# Patient Record
Sex: MALE | Race: WHITE | Employment: OTHER | URBAN - METROPOLITAN AREA
[De-identification: names, ages, dates, MRNs, and addresses within clinical notes are randomized per-mention and may not be internally consistent; named-entity substitution may affect disease eponyms.]

---

## 2017-02-27 ENCOUNTER — TELEPHONE (OUTPATIENT)
Dept: INTERNAL MEDICINE CLINIC | Facility: CLINIC | Age: 62
End: 2017-02-27

## 2017-02-27 NOTE — TELEPHONE ENCOUNTER
Patient was phoned today and advised / left a message asking to reschedule today's No Show appointment. Advised that there will be a $50 charge assessed when patient has two No Shows within 365 days.

## 2017-03-13 ENCOUNTER — OFFICE VISIT (OUTPATIENT)
Dept: INTERNAL MEDICINE CLINIC | Facility: CLINIC | Age: 62
End: 2017-03-13

## 2017-03-13 VITALS
OXYGEN SATURATION: 98 % | TEMPERATURE: 97 F | HEART RATE: 75 BPM | SYSTOLIC BLOOD PRESSURE: 130 MMHG | DIASTOLIC BLOOD PRESSURE: 78 MMHG | WEIGHT: 233 LBS | BODY MASS INDEX: 37.01 KG/M2 | HEIGHT: 66.5 IN | RESPIRATION RATE: 16 BRPM

## 2017-03-13 DIAGNOSIS — I25.10 CORONARY ARTERY DISEASE INVOLVING NATIVE CORONARY ARTERY OF NATIVE HEART WITHOUT ANGINA PECTORIS: ICD-10-CM

## 2017-03-13 DIAGNOSIS — R73.02 IGT (IMPAIRED GLUCOSE TOLERANCE): ICD-10-CM

## 2017-03-13 DIAGNOSIS — G25.81 RLS (RESTLESS LEGS SYNDROME): ICD-10-CM

## 2017-03-13 DIAGNOSIS — G47.33 OSA (OBSTRUCTIVE SLEEP APNEA): ICD-10-CM

## 2017-03-13 DIAGNOSIS — I10 ESSENTIAL HYPERTENSION: Primary | ICD-10-CM

## 2017-03-13 DIAGNOSIS — E78.5 DYSLIPIDEMIA: ICD-10-CM

## 2017-03-13 PROCEDURE — 99214 OFFICE O/P EST MOD 30 MIN: CPT | Performed by: INTERNAL MEDICINE

## 2017-03-13 RX ORDER — ROPINIROLE 0.5 MG/1
TABLET, FILM COATED ORAL
Qty: 90 TABLET | Refills: 5 | Status: SHIPPED | OUTPATIENT
Start: 2017-03-13 | End: 2017-10-04

## 2017-03-13 RX ORDER — FENOFIBRIC ACID 135 MG/1
1 CAPSULE, DELAYED RELEASE ORAL
Qty: 30 CAPSULE | Refills: 5 | Status: SHIPPED | OUTPATIENT
Start: 2017-03-13 | End: 2017-10-04

## 2017-03-13 RX ORDER — PRAVASTATIN SODIUM 20 MG
20 TABLET ORAL DAILY
Qty: 30 TABLET | Refills: 5 | Status: ON HOLD | OUTPATIENT
Start: 2017-03-13 | End: 2017-08-22

## 2017-03-13 RX ORDER — METOPROLOL SUCCINATE 50 MG/1
50 TABLET, EXTENDED RELEASE ORAL
Qty: 30 TABLET | Refills: 5 | Status: SHIPPED | OUTPATIENT
Start: 2017-03-13 | End: 2017-10-04

## 2017-03-13 RX ORDER — LISINOPRIL 10 MG/1
10 TABLET ORAL
Qty: 30 TABLET | Refills: 5 | Status: SHIPPED | OUTPATIENT
Start: 2017-03-13 | End: 2017-10-04

## 2017-03-13 NOTE — PROGRESS NOTES
Monie Jackson is a 62year old male.  To F/U from last visit regarding as follows:            HPI:    Interim history:he did f/u w/ and they gave him viagra, which he hasn't used yet PSA and exam stable, no f/ indicated  He sees Dr Azul Bazan every 6 fasciculation-cramp syndrome (HCC)     Drug-induced erectile dysfunction     AMALIA (obstructive sleep apnea)        REVIEW OF SYSTEMS:   GENERAL HEALTH: feels well otherwise  SKIN: denies any unusual skin lesions or rashes  RESPIRATORY: denies shortness of b mouth once daily. lisinopril 10 MG Oral Tab 30 tablet 5      Sig: Take 1 tablet (10 mg total) by mouth once daily. Pravastatin Sodium 20 MG Oral Tab 30 tablet 5      Sig: Take 1 tablet (20 mg total) by mouth daily.       Choline Fenofibrate (FENOF

## 2017-07-12 NOTE — ADDENDUM NOTE
Encounter addended by: Francis Pereira LPN on: 8/29/4913 66:12 AM<BR>    Actions taken: Letter status changed

## 2017-08-20 ENCOUNTER — HOSPITAL ENCOUNTER (INPATIENT)
Facility: HOSPITAL | Age: 62
LOS: 2 days | Discharge: NURSING, REHAB OR LONG-TERM CARE | DRG: 064 | End: 2017-08-23
Attending: EMERGENCY MEDICINE | Admitting: HOSPITALIST
Payer: COMMERCIAL

## 2017-08-20 ENCOUNTER — APPOINTMENT (OUTPATIENT)
Dept: MRI IMAGING | Facility: HOSPITAL | Age: 62
DRG: 064 | End: 2017-08-20
Attending: EMERGENCY MEDICINE
Payer: COMMERCIAL

## 2017-08-20 ENCOUNTER — APPOINTMENT (OUTPATIENT)
Dept: CT IMAGING | Facility: HOSPITAL | Age: 62
DRG: 064 | End: 2017-08-20
Attending: EMERGENCY MEDICINE
Payer: COMMERCIAL

## 2017-08-20 ENCOUNTER — APPOINTMENT (OUTPATIENT)
Dept: GENERAL RADIOLOGY | Facility: HOSPITAL | Age: 62
DRG: 064 | End: 2017-08-20
Attending: EMERGENCY MEDICINE
Payer: COMMERCIAL

## 2017-08-20 DIAGNOSIS — R27.0 ATAXIA: Primary | ICD-10-CM

## 2017-08-20 DIAGNOSIS — I65.01 OCCLUSION OF RIGHT VERTEBRAL ARTERY: ICD-10-CM

## 2017-08-20 DIAGNOSIS — I63.9 ACUTE CVA (CEREBROVASCULAR ACCIDENT) (HCC): ICD-10-CM

## 2017-08-20 LAB
ALBUMIN SERPL-MCNC: 4.3 G/DL (ref 3.5–4.8)
ALP LIVER SERPL-CCNC: 93 U/L (ref 45–117)
ALT SERPL-CCNC: 21 U/L (ref 17–63)
APTT PPP: 28.9 SECONDS (ref 25–34)
AST SERPL-CCNC: 15 U/L (ref 15–41)
BASOPHILS # BLD AUTO: 0.05 X10(3) UL (ref 0–0.1)
BASOPHILS NFR BLD AUTO: 0.6 %
BILIRUB SERPL-MCNC: 0.8 MG/DL (ref 0.1–2)
BUN BLD-MCNC: 16 MG/DL (ref 8–20)
CALCIUM BLD-MCNC: 9.2 MG/DL (ref 8.3–10.3)
CHLORIDE: 105 MMOL/L (ref 101–111)
CO2: 24 MMOL/L (ref 22–32)
CREAT BLD-MCNC: 1.34 MG/DL (ref 0.7–1.3)
EOSINOPHIL # BLD AUTO: 0.19 X10(3) UL (ref 0–0.3)
EOSINOPHIL NFR BLD AUTO: 2.2 %
ERYTHROCYTE [DISTWIDTH] IN BLOOD BY AUTOMATED COUNT: 13 % (ref 11.5–16)
GLUCOSE BLD-MCNC: 116 MG/DL (ref 65–99)
GLUCOSE BLD-MCNC: 123 MG/DL (ref 70–99)
HCT VFR BLD AUTO: 52.3 % (ref 37–53)
HGB BLD-MCNC: 18 G/DL (ref 13–17)
IMMATURE GRANULOCYTE COUNT: 0.03 X10(3) UL (ref 0–1)
IMMATURE GRANULOCYTE RATIO %: 0.3 %
INR BLD: 0.98 (ref 0.89–1.11)
LYMPHOCYTES # BLD AUTO: 1.78 X10(3) UL (ref 0.9–4)
LYMPHOCYTES NFR BLD AUTO: 20.6 %
M PROTEIN MFR SERPL ELPH: 8.1 G/DL (ref 6.1–8.3)
MCH RBC QN AUTO: 30.3 PG (ref 27–33.2)
MCHC RBC AUTO-ENTMCNC: 34.4 G/DL (ref 31–37)
MCV RBC AUTO: 87.9 FL (ref 80–99)
MONOCYTES # BLD AUTO: 0.62 X10(3) UL (ref 0.1–0.6)
MONOCYTES NFR BLD AUTO: 7.2 %
NEUTROPHIL ABS PRELIM: 5.96 X10 (3) UL (ref 1.3–6.7)
NEUTROPHILS # BLD AUTO: 5.96 X10(3) UL (ref 1.3–6.7)
NEUTROPHILS NFR BLD AUTO: 69.1 %
PLATELET # BLD AUTO: 270 10(3)UL (ref 150–450)
POTASSIUM SERPL-SCNC: 3.7 MMOL/L (ref 3.6–5.1)
PSA SERPL DL<=0.01 NG/ML-MCNC: 13 SECONDS (ref 12–14.3)
RBC # BLD AUTO: 5.95 X10(6)UL (ref 4.3–5.7)
RED CELL DISTRIBUTION WIDTH-SD: 41.5 FL (ref 35.1–46.3)
SODIUM SERPL-SCNC: 138 MMOL/L (ref 136–144)
TROPONIN: <0.046 NG/ML (ref ?–0.05)
WBC # BLD AUTO: 8.6 X10(3) UL (ref 4–13)

## 2017-08-20 PROCEDURE — B33RYZZ MAGNETIC RESONANCE IMAGING (MRI) OF INTRACRANIAL ARTERIES USING OTHER CONTRAST: ICD-10-PCS | Performed by: RADIOLOGY

## 2017-08-20 PROCEDURE — B33QYZZ MAGNETIC RESONANCE IMAGING (MRI) OF CERVICO-CEREBRAL ARCH USING OTHER CONTRAST: ICD-10-PCS | Performed by: RADIOLOGY

## 2017-08-20 PROCEDURE — 70553 MRI BRAIN STEM W/O & W/DYE: CPT | Performed by: EMERGENCY MEDICINE

## 2017-08-20 PROCEDURE — 71010 XR CHEST AP PORTABLE  (CPT=71010): CPT | Performed by: EMERGENCY MEDICINE

## 2017-08-20 PROCEDURE — 99223 1ST HOSP IP/OBS HIGH 75: CPT | Performed by: HOSPITALIST

## 2017-08-20 PROCEDURE — 70450 CT HEAD/BRAIN W/O DYE: CPT | Performed by: EMERGENCY MEDICINE

## 2017-08-20 PROCEDURE — 70549 MR ANGIOGRAPH NECK W/O&W/DYE: CPT | Performed by: EMERGENCY MEDICINE

## 2017-08-20 PROCEDURE — 70546 MR ANGIOGRAPH HEAD W/O&W/DYE: CPT | Performed by: EMERGENCY MEDICINE

## 2017-08-20 RX ORDER — HYDRALAZINE HYDROCHLORIDE 20 MG/ML
10 INJECTION INTRAMUSCULAR; INTRAVENOUS ONCE
Status: COMPLETED | OUTPATIENT
Start: 2017-08-20 | End: 2017-08-20

## 2017-08-20 RX ORDER — HYDRALAZINE HYDROCHLORIDE 20 MG/ML
INJECTION INTRAMUSCULAR; INTRAVENOUS
Status: DISPENSED
Start: 2017-08-20 | End: 2017-08-21

## 2017-08-20 NOTE — ED INITIAL ASSESSMENT (HPI)
Pt presents to ER with unsteady gate starting yesterday about 1700 and numbness/tingling to left side of face. Pt states that yesterday at 1700, \"I walked into the wall, like I was drunk. \" pt states that numbness and tingling to left side of face, he wok

## 2017-08-20 NOTE — ED PROVIDER NOTES
Patient Seen in: [facility] Emergency Department    History   Patient presents with:  Stroke (neurologic)    Stated Complaint: trouple speaking, unsteady gate, started yesterday    HPI    20-year-old white male who presents the emergency room today Release,  Take 1 capsule by mouth once daily. No family history on file.     Smoking status: Former Smoker                                                              Packs/day: 0.00      Years: 20.00        Types: Cigarettes  Smokeless tobacco: Cielo Lopez hamstrings dorsiflexion plantar flexion are 5 over 5 equal and symmetrical.  Knee jerk reflexes are 1+ equal and symmetrical.    Skin exam reveals no rashes.         ED Course     Labs Reviewed   COMP METABOLIC PANEL (14) - Abnormal; Notable for the followi normal.  The lungs are clear.  There are no pleural effusions.  The bones are unremarkable.        Impression     CONCLUSION:  No acute disease.           CT scan of the head reveals:  FINDINGS:       VENTRICLES/SULCI:  Ventricles and sulci are prominent co T2 / FLAIR hyperintensities in the subcortical periventricular matter consistent with small vessel scan change. There is no evidence of mass lesion or mass effect.  There is susceptibility artifact noted from a piece of metal   within the right scalp on mul minimal reconstitution of the vertebral artery within the distal V4 segment on the right likely due to retrograde flow. This occlusion may represent a   dissection of the vertebral artery at the origin.  Possibility of in situ thrombosis of a vertebral lois Impression:  Ataxia  (primary encounter diagnosis)  Acute CVA (cerebrovascular accident) ([facility] [address]  Occlusion of right vertebral artery    Disposition:  Admit    Follow-up:  No follow-up provider specified.     Medications Prescribed:  Current Discharge Medicat

## 2017-08-21 ENCOUNTER — APPOINTMENT (OUTPATIENT)
Dept: CV DIAGNOSTICS | Facility: HOSPITAL | Age: 62
DRG: 064 | End: 2017-08-21
Attending: HOSPITALIST
Payer: COMMERCIAL

## 2017-08-21 PROBLEM — I63.9 ACUTE CVA (CEREBROVASCULAR ACCIDENT) (HCC): Status: ACTIVE | Noted: 2017-08-21

## 2017-08-21 PROBLEM — I65.01 OCCLUSION OF RIGHT VERTEBRAL ARTERY: Status: ACTIVE | Noted: 2017-08-21

## 2017-08-21 PROBLEM — N18.30 CKD (CHRONIC KIDNEY DISEASE), STAGE III (HCC): Status: ACTIVE | Noted: 2017-08-21

## 2017-08-21 LAB
ATRIAL RATE: 74 BPM
BILIRUB UR QL STRIP.AUTO: NEGATIVE
CLARITY UR REFRACT.AUTO: CLEAR
COLOR UR AUTO: YELLOW
EST. AVERAGE GLUCOSE BLD GHB EST-MCNC: 117 MG/DL (ref 68–126)
GLUCOSE UR STRIP.AUTO-MCNC: NEGATIVE MG/DL
HBA1C MFR BLD HPLC: 5.7 % (ref ?–5.7)
KETONES UR STRIP.AUTO-MCNC: NEGATIVE MG/DL
LEUKOCYTE ESTERASE UR QL STRIP.AUTO: NEGATIVE
NITRITE UR QL STRIP.AUTO: NEGATIVE
P AXIS: 60 DEGREES
P-R INTERVAL: 136 MS
PH UR STRIP.AUTO: 5 [PH] (ref 4.5–8)
POTASSIUM SERPL-SCNC: 4.2 MMOL/L (ref 3.6–5.1)
PROT UR STRIP.AUTO-MCNC: NEGATIVE MG/DL
Q-T INTERVAL: 380 MS
QRS DURATION: 98 MS
QTC CALCULATION (BEZET): 421 MS
R AXIS: 158 DEGREES
RBC UR QL AUTO: NEGATIVE
SP GR UR STRIP.AUTO: 1.03 (ref 1–1.03)
T AXIS: 57 DEGREES
UROBILINOGEN UR STRIP.AUTO-MCNC: 2 MG/DL
VENTRICULAR RATE: 74 BPM

## 2017-08-21 PROCEDURE — 99232 SBSQ HOSP IP/OBS MODERATE 35: CPT | Performed by: HOSPITALIST

## 2017-08-21 PROCEDURE — 99223 1ST HOSP IP/OBS HIGH 75: CPT | Performed by: OTHER

## 2017-08-21 PROCEDURE — 93306 TTE W/DOPPLER COMPLETE: CPT | Performed by: HOSPITALIST

## 2017-08-21 RX ORDER — FAMOTIDINE 20 MG/1
20 TABLET ORAL 2 TIMES DAILY
Status: DISCONTINUED | OUTPATIENT
Start: 2017-08-21 | End: 2017-08-23

## 2017-08-21 RX ORDER — ROPINIROLE 0.5 MG/1
0.5 TABLET, FILM COATED ORAL EVERY MORNING
Status: DISCONTINUED | OUTPATIENT
Start: 2017-08-21 | End: 2017-08-23

## 2017-08-21 RX ORDER — ASPIRIN 300 MG
300 SUPPOSITORY, RECTAL RECTAL DAILY
Status: DISCONTINUED | OUTPATIENT
Start: 2017-08-21 | End: 2017-08-23

## 2017-08-21 RX ORDER — LISINOPRIL 10 MG/1
10 TABLET ORAL
Status: DISCONTINUED | OUTPATIENT
Start: 2017-08-21 | End: 2017-08-23

## 2017-08-21 RX ORDER — MORPHINE SULFATE 4 MG/ML
1 INJECTION, SOLUTION INTRAMUSCULAR; INTRAVENOUS EVERY 2 HOUR PRN
Status: DISCONTINUED | OUTPATIENT
Start: 2017-08-21 | End: 2017-08-23

## 2017-08-21 RX ORDER — ROPINIROLE 0.5 MG/1
1 TABLET, FILM COATED ORAL NIGHTLY
Status: DISCONTINUED | OUTPATIENT
Start: 2017-08-21 | End: 2017-08-23

## 2017-08-21 RX ORDER — FAMOTIDINE 10 MG/ML
20 INJECTION, SOLUTION INTRAVENOUS 2 TIMES DAILY
Status: DISCONTINUED | OUTPATIENT
Start: 2017-08-21 | End: 2017-08-23

## 2017-08-21 RX ORDER — MORPHINE SULFATE 4 MG/ML
2 INJECTION, SOLUTION INTRAMUSCULAR; INTRAVENOUS EVERY 2 HOUR PRN
Status: DISCONTINUED | OUTPATIENT
Start: 2017-08-21 | End: 2017-08-23

## 2017-08-21 RX ORDER — METOCLOPRAMIDE HYDROCHLORIDE 5 MG/ML
10 INJECTION INTRAMUSCULAR; INTRAVENOUS EVERY 8 HOURS PRN
Status: DISCONTINUED | OUTPATIENT
Start: 2017-08-21 | End: 2017-08-23

## 2017-08-21 RX ORDER — FENOFIBRATE 134 MG/1
134 CAPSULE ORAL
Status: DISCONTINUED | OUTPATIENT
Start: 2017-08-21 | End: 2017-08-23

## 2017-08-21 RX ORDER — ENOXAPARIN SODIUM 100 MG/ML
100 INJECTION SUBCUTANEOUS ONCE
Status: COMPLETED | OUTPATIENT
Start: 2017-08-21 | End: 2017-08-21

## 2017-08-21 RX ORDER — SENNOSIDES 8.6 MG
17.2 TABLET ORAL NIGHTLY
Status: DISCONTINUED | OUTPATIENT
Start: 2017-08-21 | End: 2017-08-23

## 2017-08-21 RX ORDER — PRAVASTATIN SODIUM 20 MG
20 TABLET ORAL NIGHTLY
Status: DISCONTINUED | OUTPATIENT
Start: 2017-08-21 | End: 2017-08-22

## 2017-08-21 RX ORDER — PHENYLEPHRINE HCL IN 0.9% NACL 50MG/250ML
PLASTIC BAG, INJECTION (ML) INTRAVENOUS CONTINUOUS PRN
Status: DISCONTINUED | OUTPATIENT
Start: 2017-08-21 | End: 2017-08-21

## 2017-08-21 RX ORDER — ATORVASTATIN CALCIUM 80 MG/1
80 TABLET, FILM COATED ORAL NIGHTLY
Status: DISCONTINUED | OUTPATIENT
Start: 2017-08-21 | End: 2017-08-21

## 2017-08-21 RX ORDER — ASPIRIN 325 MG
325 TABLET, DELAYED RELEASE (ENTERIC COATED) ORAL ONCE
Status: COMPLETED | OUTPATIENT
Start: 2017-08-21 | End: 2017-08-21

## 2017-08-21 RX ORDER — POTASSIUM CHLORIDE 20 MEQ/1
40 TABLET, EXTENDED RELEASE ORAL ONCE
Status: COMPLETED | OUTPATIENT
Start: 2017-08-21 | End: 2017-08-21

## 2017-08-21 RX ORDER — ACETAMINOPHEN 650 MG/1
650 SUPPOSITORY RECTAL EVERY 4 HOURS PRN
Status: DISCONTINUED | OUTPATIENT
Start: 2017-08-21 | End: 2017-08-23

## 2017-08-21 RX ORDER — BUTALBITAL, ACETAMINOPHEN AND CAFFEINE 50; 325; 40 MG/1; MG/1; MG/1
1 TABLET ORAL EVERY 4 HOURS PRN
Status: DISCONTINUED | OUTPATIENT
Start: 2017-08-21 | End: 2017-08-23

## 2017-08-21 RX ORDER — POLYETHYLENE GLYCOL 3350 17 G/17G
17 POWDER, FOR SOLUTION ORAL DAILY PRN
Status: DISCONTINUED | OUTPATIENT
Start: 2017-08-21 | End: 2017-08-23

## 2017-08-21 RX ORDER — ACETAMINOPHEN 325 MG/1
650 TABLET ORAL EVERY 4 HOURS PRN
Status: DISCONTINUED | OUTPATIENT
Start: 2017-08-21 | End: 2017-08-23

## 2017-08-21 RX ORDER — LABETALOL HYDROCHLORIDE 5 MG/ML
10 INJECTION, SOLUTION INTRAVENOUS EVERY 10 MIN PRN
Status: DISCONTINUED | OUTPATIENT
Start: 2017-08-21 | End: 2017-08-23

## 2017-08-21 RX ORDER — ASPIRIN 325 MG
325 TABLET ORAL DAILY
Status: DISCONTINUED | OUTPATIENT
Start: 2017-08-21 | End: 2017-08-23

## 2017-08-21 RX ORDER — ACETAMINOPHEN 325 MG/1
650 TABLET ORAL EVERY 6 HOURS PRN
Status: DISCONTINUED | OUTPATIENT
Start: 2017-08-21 | End: 2017-08-23

## 2017-08-21 RX ORDER — ONDANSETRON 2 MG/ML
4 INJECTION INTRAMUSCULAR; INTRAVENOUS EVERY 6 HOURS PRN
Status: DISCONTINUED | OUTPATIENT
Start: 2017-08-21 | End: 2017-08-23

## 2017-08-21 RX ORDER — METOPROLOL SUCCINATE 50 MG/1
50 TABLET, EXTENDED RELEASE ORAL
Status: DISCONTINUED | OUTPATIENT
Start: 2017-08-21 | End: 2017-08-23

## 2017-08-21 RX ORDER — SODIUM PHOSPHATE, DIBASIC AND SODIUM PHOSPHATE, MONOBASIC 7; 19 G/133ML; G/133ML
1 ENEMA RECTAL ONCE AS NEEDED
Status: DISCONTINUED | OUTPATIENT
Start: 2017-08-21 | End: 2017-08-23

## 2017-08-21 RX ORDER — BISACODYL 10 MG
10 SUPPOSITORY, RECTAL RECTAL
Status: DISCONTINUED | OUTPATIENT
Start: 2017-08-21 | End: 2017-08-23

## 2017-08-21 RX ORDER — ENOXAPARIN SODIUM 100 MG/ML
1 INJECTION SUBCUTANEOUS EVERY 12 HOURS SCHEDULED
Status: DISCONTINUED | OUTPATIENT
Start: 2017-08-21 | End: 2017-08-23

## 2017-08-21 RX ORDER — SODIUM CHLORIDE 9 MG/ML
INJECTION, SOLUTION INTRAVENOUS CONTINUOUS
Status: DISCONTINUED | OUTPATIENT
Start: 2017-08-21 | End: 2017-08-22

## 2017-08-21 RX ORDER — DOCUSATE SODIUM 100 MG/1
100 CAPSULE, LIQUID FILLED ORAL 2 TIMES DAILY
Status: DISCONTINUED | OUTPATIENT
Start: 2017-08-21 | End: 2017-08-23

## 2017-08-21 NOTE — PHYSICAL THERAPY NOTE
PHYSICAL THERAPY EVALUATION - INPATIENT     Room Number: 9288/3568-H  Evaluation Date: 8/21/2017  Type of Evaluation: Initial  Physician Order: PT Eval and Treat    Presenting Problem: CVA  Reason for Therapy: Mobility Dysfunction and Discharge Tino HISTORY      Comment: right shoulder  1999: SHOULDER ARTHROSCOPY  No date: TOTAL KNEE REPLACEMENT    HOME SITUATION  Type of Home: Condo   Home Layout: One level  Stairs to Enter : 0     Stairs to [employer]: 0       Lives With:  (His son's mother)  Drives:  Celso Hodgkin [place]              Modified [place]: 4                  NEUROLOGICAL FINDINGS  Neurological Findings: Coordination - Rapid Alternating Movement;Sensation; Tone        Coordination - Rapid Alternating Movement: Left decreased speed  Sensation: light touch gr discharge. Patient reports he just wants to return to work    Exercise/Education Provided:  Gait training  Transfer training  discharge planning    Patient End of Session: Up in chair;Needs met;Call light within reach;RN aware of session/findings; All keren

## 2017-08-21 NOTE — CM/SW NOTE
08/21/17 1600   CM/SW Referral Data   Referral Source    Reason for Referral Discharge planning   Informant Patient   Pertinent Medical Hx   Primary Care Physician Name Dr Fercho Dykes   Date of Last Contact with PCP 6/2017   Patient Info

## 2017-08-21 NOTE — PROGRESS NOTES
08/21/17 1714   Clinical Encounter Type   Visited With Patient   Routine Visit Introduction   Continue Visiting No   Evangelical Encounters   Spiritual Requests During Visit / Hospitalization [address]

## 2017-08-21 NOTE — OCCUPATIONAL THERAPY NOTE
Per stroke protocol, pt is on bedrest until 0152 Tuesday. OT will evaluate the patient once the activity level is upgraded. Spoke with RN.

## 2017-08-21 NOTE — PAYOR COMM NOTE
--------------  ADMISSION REVIEW     Payor: [employer]  Subscriber #:  [id number]  Authorization Number: N/A    Admit date: 8/21/17  Admit time: 0126       Admitting Physician: Mel Duran DO  Attending Physician:  Royal Morataya  Primary Car mg total) by mouth once daily. lisinopril 10 MG Oral Tab,  Take 1 tablet (10 mg total) by mouth once daily. Pravastatin Sodium 20 MG Oral Tab,  Take 1 tablet (20 mg total) by mouth daily.    rOPINIRole HCl 0.5 MG Oral Tab,  Take 1 in the morning and 2 a extremity muscle strength quadriceps hamstrings dorsiflexion plantar flexion are 5 over 5 equal and symmetrical.  Knee jerk reflexes are 1+ equal and symmetrical.  Skin exam reveals no rashes.     ED Course[EP.1]     Labs Reviewed   COMP METABOLIC PANEL (14 inflammation. SKULL:             No evidence for fracture or osseous abnormality.   OTHER:             None.        CONCLUSION:  Focus of high attenuation in the left cingulate gyrus measuring 7 mm in size which likely represents calcification related to a       No visible stenosis or aneurysm. ANTERIOR CEREBRALS:         No visible stenosis or aneurysm. ANTERIOR COMMUNICATING:  No visible stenosis or aneurysm. MIDDLE CEREBRALS:         No visible stenosis or aneurysm.   POSTERIOR COMMUNICATING: No visible hemorrhage.     4. Less than 50% carotid artery stenosis involving the bilateral proximal internal carotid arteries.   [EP.3]     ED Course    MDM[EP.1]   Patient had an IV established in the emergency room was placed on cardiac monitor was observed.   Annmarie to gait ataxia. Patient reports he veers to his left whenever he is gets up to talk. Has run into wall several times today. Reports numbness on left side of face. Family reports mild left facial droop but no slurred speech.  No other complaints at this time PLAN:[NG.1]     1. Acute CVA  1. MRi brain noted[NG.2]   2. CVA order set in place[NG.1]  3. Echo[NG.2]  4. PT/OT/SLP eval neurology consulted[NG.1]  2. 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 Full dose lovenox per neuro  2.  Ne 8/21/2017 4285 Given 50 mg Oral Dorene Bateman RN      Potassium Chloride ER (K-DUR M20) CR tab 40 mEq     Date Action Dose Route User    8/21/2017 0302 Given 40 mEq Oral Dorene Bateman RN      Pravastatin Sodium (PRAVACHOL) tab 20 mg     Date Action Dose Rou

## 2017-08-21 NOTE — PROGRESS NOTES
[id number] Kenisha Salazar Neurology Preliminary Note    Bisichristal Benítez Patient Status:  Inpatient    1955 MRN [id number]   [facility] 7NE-A Attending Maggie [facility] Day # 0 PCP iNck Thornton MD     REASON F PROSTATE,NEEDLE/PUNCH      Comment: benign  No date: COLOSTOMY      Comment: had reversal  1986: KNEE REPLACEMENT SURGERY      Comment: left knee replacement  1986: KNEE REPLACEMENT SURGERY      Comment: left knee  1999: OTHER SURGICAL HISTORY      Comment PRN   bisacodyl (DULCOLAX) rectal suppository 10 mg 10 mg Rectal Daily PRN   FLEET ENEMA (FLEET) 7-19 GM/118ML enema 133 mL 1 enema Rectal Once PRN   ondansetron HCl (ZOFRAN) injection 4 mg 4 mg Intravenous Q6H PRN   Or      Metoclopramide HCl (REGLAN) inj for balance, did not attempt to take steps.    SKIN: Warm, dry, no rashes    DIAGNOSTIC DATA:    Lab Results  Component Value Date   WBC 8.6 08/20/2017   HGB 18.0 08/20/2017   HCT 52.3 08/20/2017   .0 08/20/2017   CREATSERUM 1.34 08/20/2017   BUN 16 carotid artery stenosis involving the bilateral proximal internal carotid arteries.     8/20 ECHO: completed, await read      IMPRESSION:  Pt. is a 65year old male with multiple co-morbidies and risk factors for stroke found to have an acute/subacute punct

## 2017-08-21 NOTE — PROGRESS NOTES
[facility]  Progress Note     Marcelatrinidad Ziegler Patient Status:  Inpatient    1955 MRN [id number]   [facility] 7NE-A Attending Tamar Alanizh[address] Day # 0 PCP Tomy Garcia MD     Chief Complaint:    At vertebral artery origin also in the differential.     3. 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 4. Less than 50% carotid artery stenosis involving the bilateral proximal internal carotid arteries.   MICRO:   Medications:   • f continues to have some facial numbness and slight droop. No overnight events or new complaints. Physical exam:  General: Patient awake, alert, and oriented ×3. No acute respiratory distress.   Lungs: CTA B  CVS: Regular rhythm  Abdomen: Soft, nontender/n

## 2017-08-21 NOTE — CONSULTS
[facility]    PATIENT'S NAME: Liliam Fu   ATTENDING PHYSICIAN: Francy Loco D.O.   CONSULTING PHYSICIAN: Zach Mishra M.D.    PATIENT ACCOUNT#:   [de-identified]    [facility]  MEDICAL RECORD #:   [id number]       DATE OF BIR The patient has restless legs syndrome and obesity. PAST SURGICAL HISTORY:  Multiple knee surgeries with knee pain on the left side; shoulder surgery.     MEDICATIONS:  Current medication includes Prinivil,Toprol, pravastatin, Requip, Tylenol p.r.n., Re ultrasound showed less than 50% stenosis. Echo pending.     IMPRESSION:  Evidently, this patient has acute to subacute left cerebellar stroke and presented with symptoms of lightheadedness and imbalance with gait and very subtle left-sided numbness in

## 2017-08-21 NOTE — OCCUPATIONAL THERAPY NOTE
OCCUPATIONAL THERAPY EVALUATION - INPATIENT     Room Number: 8021/4779-B  Evaluation Date: 8/21/2017  Type of Evaluation: Initial  Presenting Problem: acute to subacute punctate infarct L cerebellum and probable L lateral medulla    Physician Order: IP Con (cerebrovascular accident) [facility])    Occlusion of right vertebral artery    CKD (chronic kidney disease), stage III      Past Medical History  Past Medical History:   Diagnosis Date   • Arthritis    • BPH (benign prostatic hyperplasia) 9/19/2014   • CAD, mu Status:  WFL - within functional limits    VISION  Current Vision: no visual deficits  Tracking:  able to track stimulus in all quads without difficulty  Saccades:  intact  Acuity:  able to read clock/calendar on wall without difficulty and able to read em and manipulate socks when he was removing them and donning a new pair back on his feet. Intact fine motor B hands during ADL.   However, slight delay and decreased accuracy noted L hand during finger-to-nose test.  Min A sit to stand, min A x 1 with RW and on PHQ9.   Scores demonstrate:  0-4 - Min risk of depression  5-9 - Mild risk of depression  10-14 - Moderate risk of depression  15-19 - Moderately severe risk of depression  20-27 - Severe risk of depression      OT Discharge Recommendations: Acute rehabi

## 2017-08-21 NOTE — PLAN OF CARE
Received patient via cart from the ED with no c/o chest pain or SOB. C/o numbness on left side of face and arm. No facial droop noted. Stumbling into walls d/t off balance. Alert and oriented x4. Telemetry monitor reading SR.  Rt. AC 20g assessed, flushed,

## 2017-08-21 NOTE — SLP NOTE
ADULT SWALLOWING EVALUATION    ASSESSMENT & PLAN   ASSESSMENT  Order received for bedside swallow evaluation.   Per chart review:    History of Present Illness: Isabell Putnam is a 65year old male with a PMH of non-obstructive CAD, HTN, AMALIA, dyslipi Diagnosis Date   • Arthritis    • BPH (benign prostatic hyperplasia) 9/19/2014   • CAD, multiple vessel 2005,7,12    nonocclusive   • Coronary atherosclerosis    • Diverticulitis    • High blood pressure    • HTN (hypertension)    • IGT (impaired glucose utilize compensatory strategies as outlined by  BSSE (clinical evaluation) including Slow rate, Small bites, Small sips, Alternate liquids/solids, Upright 90 degrees 30 mins after meal with no assistance 95 % of the time across 2 sessions.    Goal #4 Comple

## 2017-08-21 NOTE — H&P
[facility]  History and Physical     Baldemar Gauthier Patient Status:  Emergency    1955 MRN [id number]   Location [address] Attending Taj Perez MD   Hosp Day # 0 PCP Amber Mckeon MD      File Prior to Encounter:  Metoprolol Succinate ER 50 MG Oral Tablet 24 Hr Take 1 tablet (50 mg total) by mouth once daily. Disp: 30 tablet Rfl: 5   lisinopril 10 MG Oral Tab Take 1 tablet (10 mg total) by mouth once daily.  Disp: 30 tablet Rfl: 5   Pravasta 93   AST  15   ALT  21   BILT  0.8   TP  8.1       Estimated Creatinine Clearance: 52.2 mL/min (based on SCr of 1.34 mg/dL).     Recent Labs   Lab  08/20/17 1831   PTP  13.0   INR  0.98       Recent Labs   Lab  08/20/17 1831   TROP  <0.046       Imaging

## 2017-08-21 NOTE — PLAN OF CARE
Assumed care of patient at 0700  AOx4, RA, NSR on tele   Denies pain, VSS  Left arm, facial numbness, strength equal bilaterally, PERRL  NIH- 2, neuro checks per protocol  PT/OT  Up with 1-2 with walker, unsteady gait, dizzy at times  Tolerating diet  IVF

## 2017-08-22 LAB
BASOPHILS # BLD AUTO: 0.04 X10(3) UL (ref 0–0.1)
BASOPHILS NFR BLD AUTO: 0.6 %
BUN BLD-MCNC: 15 MG/DL (ref 8–20)
CALCIUM BLD-MCNC: 9.1 MG/DL (ref 8.3–10.3)
CHLORIDE: 109 MMOL/L (ref 101–111)
CO2: 22 MMOL/L (ref 22–32)
CREAT BLD-MCNC: 1.16 MG/DL (ref 0.7–1.3)
EOSINOPHIL # BLD AUTO: 0.1 X10(3) UL (ref 0–0.3)
EOSINOPHIL NFR BLD AUTO: 1.4 %
ERYTHROCYTE [DISTWIDTH] IN BLOOD BY AUTOMATED COUNT: 13.3 % (ref 11.5–16)
GLUCOSE BLD-MCNC: 97 MG/DL (ref 70–99)
HCT VFR BLD AUTO: 47 % (ref 37–53)
HGB BLD-MCNC: 15.8 G/DL (ref 13–17)
IMMATURE GRANULOCYTE COUNT: 0.02 X10(3) UL (ref 0–1)
IMMATURE GRANULOCYTE RATIO %: 0.3 %
LYMPHOCYTES # BLD AUTO: 1.54 X10(3) UL (ref 0.9–4)
LYMPHOCYTES NFR BLD AUTO: 21.6 %
MCH RBC QN AUTO: 30.3 PG (ref 27–33.2)
MCHC RBC AUTO-ENTMCNC: 33.6 G/DL (ref 31–37)
MCV RBC AUTO: 90.2 FL (ref 80–99)
MONOCYTES # BLD AUTO: 0.64 X10(3) UL (ref 0.1–0.6)
MONOCYTES NFR BLD AUTO: 9 %
NEUTROPHIL ABS PRELIM: 4.79 X10 (3) UL (ref 1.3–6.7)
NEUTROPHILS # BLD AUTO: 4.79 X10(3) UL (ref 1.3–6.7)
NEUTROPHILS NFR BLD AUTO: 67.1 %
PLATELET # BLD AUTO: 206 10(3)UL (ref 150–450)
POTASSIUM SERPL-SCNC: 4.2 MMOL/L (ref 3.6–5.1)
RBC # BLD AUTO: 5.21 X10(6)UL (ref 4.3–5.7)
RED CELL DISTRIBUTION WIDTH-SD: 43.8 FL (ref 35.1–46.3)
SODIUM SERPL-SCNC: 139 MMOL/L (ref 136–144)
WBC # BLD AUTO: 7.1 X10(3) UL (ref 4–13)

## 2017-08-22 PROCEDURE — 99232 SBSQ HOSP IP/OBS MODERATE 35: CPT | Performed by: OTHER

## 2017-08-22 PROCEDURE — 99232 SBSQ HOSP IP/OBS MODERATE 35: CPT | Performed by: HOSPITALIST

## 2017-08-22 RX ORDER — ATORVASTATIN CALCIUM 80 MG/1
80 TABLET, FILM COATED ORAL NIGHTLY
Qty: 30 TABLET | Refills: 2 | Status: SHIPPED | OUTPATIENT
Start: 2017-08-22 | End: 2017-10-04

## 2017-08-22 RX ORDER — ATORVASTATIN CALCIUM 80 MG/1
80 TABLET, FILM COATED ORAL NIGHTLY
Status: DISCONTINUED | OUTPATIENT
Start: 2017-08-22 | End: 2017-08-23

## 2017-08-22 RX ORDER — ASPIRIN 325 MG
325 TABLET ORAL DAILY
Qty: 30 TABLET | Refills: 2 | Status: SHIPPED | OUTPATIENT
Start: 2017-08-22 | End: 2017-10-04

## 2017-08-22 RX ORDER — ENOXAPARIN SODIUM 100 MG/ML
1 INJECTION SUBCUTANEOUS EVERY 12 HOURS SCHEDULED
Qty: 60 ML | Refills: 2 | Status: SHIPPED | OUTPATIENT
Start: 2017-08-22 | End: 2017-10-05

## 2017-08-22 NOTE — CONSULTS
.[address] [place] [address] Patient Status:  Inpatient    1955 MRN [id number]   [facility] 7NE-A Attending Yazan BurnettCHUYITA [facility] Day # 1 PCP Quin Claude, MD     Patient Identification  Nikita Lima candidate since these symptoms have been over 24 hours of onset. 2D Echo Conclusions:    1. Left ventricle: The cavity size was normal. Wall thickness was normal.     Systolic function was normal. The estimated ejection fraction was 55%.  No     diagnost replacement  1986: KNEE REPLACEMENT SURGERY      Comment: left knee  1999: OTHER SURGICAL HISTORY      Comment: right shoulder  1999: SHOULDER ARTHROSCOPY  No date: TOTAL KNEE REPLACEMENT      atorvastatin (LIPITOR) tab 80 mg 80 mg Oral Nightly   fenofibra 100 mg 100 mg Subcutaneous Once   enoxaparin sodium (LOVENOX) 100 MG/ML injection 100 mg 1 mg/kg Subcutaneous 2 times per day   [COMPLETED] Potassium Chloride ER (K-DUR M20) CR tab 40 mEq 40 mEq Oral Once   Butalbital-APAP-Caffeine (FIORICET, ESGIC) per ta Neck: No neck masses or thyroid enlargement/tenderness/nodules. Lungs:   Resonant, clear breath sounds, quiet accessory muscles. Chest wall:  No tenderness or deformity. Cardiovascular:  Heart with regular rate rhythm, no murmurs appreciated. function, cardiac function, pulmonary status, neurologic status, DVT prevention. Estimated length of stay in recommended rehabilitation level of care is 2 weeks.       The patient has good potential to achieve the goal to return home at [facility]

## 2017-08-22 NOTE — PLAN OF CARE
Patient alert and oriented times four. Meds given per MAR. Patient has SCDs on. Patient in room close to nurse's station. Neuro checks Q4. Resting comfortably in bed. Vital signs stable. Call light in reach.     CARDIOVASCULAR - ADULT    • Maintains o

## 2017-08-22 NOTE — PROGRESS NOTES
[facility]  Progress Note     Haritha Velasquez Patient Status:  Inpatient    1955 MRN [id number]   [facility] 7NE-A Attending [facility] Day # 1 PCP Mya Patrick MD     Chief Complaint:    At inferior cerebellar hemisphere and probably left lateral medulla.      2. Occlusion of the right vertebral artery likely due to a dissection with in situ thrombosis of the vertebral artery origin also in the differential.     3. Left single gyrus cavernoma beds    Estimated date of discharge:  Wed?    Discharge is dependent on: clinical course, need for rehab  At this point Mr. Elvin Soto is expected to be discharge to:  [facility] of care discussed with  RN, pt  EZIO Forbes  Pager [phone]

## 2017-08-22 NOTE — PLAN OF CARE
Pt A/Ox4, Lt facial and arm numbness, slowly resolving  On RA, NSR per tele, denies any pain  Pt up and ambulating with 1 assist and a walker, tolerating well  IVF d/c'd per neuro  Dr. Lacie Bobby ok'd for pt to be d/c'd to [facility]  PLAN: awaiting insurance approval

## 2017-08-22 NOTE — OCCUPATIONAL THERAPY NOTE
OCCUPATIONAL THERAPY TREATMENT NOTE - INPATIENT     Room Number: 9618/3208-Y  Session: 1   Number of Visits to Meet Established Goals: 5    Presenting Problem: acute to subacute punctate infarct L cerebellum and probable L lateral medulla    History relate III      Past Medical History  Past Medical History:   Diagnosis Date   • Arthritis    • BPH (benign prostatic hyperplasia) 9/19/2014   • CAD, multiple vessel 2005,7,12    nonocclusive   • Coronary atherosclerosis    • Diverticulitis    • High blood pressu for assistance, since he needed to use the bathroom. Min A supine to sit, min A sit to stand. Min A with RW to ambulate to bathroom. Min A to maintain dynamic standing balance when he was transferring his hands from RW to the grab bar in the bathroom.   A coordination activities; Compensatory technique education  Rehab Potential : Good  Frequency (Obs): Daily      OT Goals:   ADL Goals  Patient will perform grooming: with supervision and while standing at sink-ongoing  Patient will perform upper body dressin

## 2017-08-22 NOTE — CM/SW NOTE
Spoke with patient regarding PT recommendation of Acute rehab. Pt will be seen by physiatrist today. Pt is agreeable to [facility] if accepted.      Jaycee Martínez RN, [facility]/CM  [phone]

## 2017-08-22 NOTE — PROGRESS NOTES
[address] Kenisha Salazar Neurology Progress Note    Flaquitogalo Landaverde Patient Status:  Inpatient    1955 MRN [id number]   [facility] 7NE-A Attending [facility] Day # 1 PCP Kajal Torre MD         Subject nystagmus  CN V: Decreased sensation to left V2-V3 when compared to right  CN VII: Symmetric facial movement   CN VIII: Normal hearing   CN IX, XI: uvula and palate elevate symmetrically    CN XI: shoulder shrug equal    CN XII: tongue midline  Motor: No d occlusion  2. Therapeutic Lovenox dosing  2. ECHO: EF 64%, grade 1 diastolic dysfunction, no thrombus, significant stenosis, shunting, or effusions noted  3. Stroke/TIA order set in place  1. ASA 325mg daily  2.  Lipid Panel:  , , HDL 31, LDL 90 with APN, I reviewed imaging film, labs  Myself,  formed treatment plan, I agreed above note,   I have personally updated assessment and treatment to family at bedside in details, all questions were answered to satisfactory.        Ella Gaston  Neurologist

## 2017-08-22 NOTE — SLP NOTE
Attempted to see for therapy session however patient currently occupied with other discipline. Will re-attempt as available and appropriate.     Christian Mendes MA, [address] [facility]  Speech Language Pathologist

## 2017-08-22 NOTE — PHYSICAL THERAPY NOTE
PHYSICAL THERAPY TREATMENT NOTE - INPATIENT    Room Number: 5436/3954-H     Session: 1   Number of Visits to Meet Established Goals: 5    Presenting Problem: CVA    Problem List  Principal Problem:    Ataxia  Active Problems:    CAD (coronary artery disea patient currently have. ..  -   Turning over in bed (including adjusting bedclothes, sheets and blankets)?: A Little   -   Sitting down on and standing up from a chair with arms (e.g., wheelchair, bedside commode, etc.): A Little   -   Moving from lying on of session/findings; All patient questions and concerns addressed    ASSESSMENT     Pt seen for gait training, active flexibility and BLE strengthening, due to [facility] admission for CVA.     Results of the AM-PAC \"6 clicks\" Inpatient Daily Mobility

## 2017-08-23 VITALS
RESPIRATION RATE: 17 BRPM | TEMPERATURE: 98 F | HEIGHT: 66 IN | DIASTOLIC BLOOD PRESSURE: 78 MMHG | WEIGHT: 210.13 LBS | OXYGEN SATURATION: 98 % | SYSTOLIC BLOOD PRESSURE: 136 MMHG | BODY MASS INDEX: 33.77 KG/M2 | HEART RATE: 66 BPM

## 2017-08-23 PROCEDURE — 99239 HOSP IP/OBS DSCHRG MGMT >30: CPT | Performed by: HOSPITALIST

## 2017-08-23 NOTE — PROGRESS NOTES
[facility]  Progress Note     Genesisrosalinagennaro Jersey Patient Status:  Inpatient    1955 MRN [id number]   [facility] 7NE-A Attending Raelyn Cockayne, MD    Hosp Day # 2 PCP Vincent Medina MD     Chief Complaint:   Ataxia     S: Succinate ER  50 mg Oral Daily Beta Blocker   • rOPINIRole HCl  0.5 mg Oral QAM   • ROPINIRole HCl  1 mg Oral Nightly   • aspirin  300 mg Rectal Daily    Or   • aspirin  325 mg Oral Daily   • Senna  17.2 mg Oral Nightly   • docusate sodium  100 mg Oral BID soft    -cont. ASA/statin and BP control  -cont.  lovenox for 3 months    Lexi Lowery MD

## 2017-08-23 NOTE — CM/SW NOTE
Pt ready for d/c today. MJ willing to fast track pt and admit him today. Miles Campbell from [place] [address] called to say that they have a bed for pt - pt will go into Room 1161. Gave report number to RN [phone].   Pt thinks his cousin will be able to pick him up and tr

## 2017-08-23 NOTE — PLAN OF CARE
Pt A/Ox4, Lt facial and arm numbness, slowly resolving, no other neuro deficits  On RA, NSR per tele, denies any pain  Pt up and ambulating with 1 assist and a walker, tolerating well  PLAN: Discharge to [facility] later today  Will cont to monitor      CAR

## 2017-08-23 NOTE — PLAN OF CARE
NURSING DISCHARGE NOTE    Discharged Rehab facility via Wheelchair. Accompanied by Family member and Support staff  Belongings Taken by patient/family. Discharge instructions given to patient, all questions and concerns answered.  Stroke education a

## 2017-08-23 NOTE — CM/SW NOTE
08/23/17 1500   Discharge disposition   Discharged to: [address] [place] [address]   Name of Facillity/Home Care/Hospice [facility]   Patient is Discharged to a [address] [place] [address] Yes   Discharge transportation [facility]

## 2017-08-23 NOTE — PLAN OF CARE
Assumed care of pt 1900. Neuro checks q4hrs. Aox4. Slight lt facial droop, lt cheek, chin, and arm numbness noted. Pt denies HA or pain. SR per tele. BP within ordered parameters. Up x1 assist and walker. Waiting for insurance approval to MICKEY.  Pt sleeping c

## 2017-08-23 NOTE — CM/SW NOTE
Pt's cousin could not provide transportation to [employer]. Pt wants to go by [employer] - he understands the cost.  Arranged Edw medicar to  pt at 4:30pm tonight. RN aware.

## 2017-08-23 NOTE — PHYSICAL THERAPY NOTE
PHYSICAL THERAPY TREATMENT NOTE - INPATIENT    Room Number: 8616/4051-W     Session: 2   Number of Visits to Meet Established Goals: 5    Presenting Problem: CVA    Problem List  Principal Problem:    Ataxia  Active Problems:    CAD (coronary artery disea BASIC MOBILITY  How much difficulty does the patient currently have. ..  -   Turning over in bed (including adjusting bedclothes, sheets and blankets)?: A Little   -   Sitting down on and standing up from a chair with arms (e.g., wheelchair, bedside commode session/findings; All patient questions and concerns addressed; Alarm set    ASSESSMENT     Pt seen for gait training, active flexibility and BLE strengthening, due to [facility] admission for CVA.     Results of the AM-PAC \"6 clicks\" Inpatient Daily M

## 2017-08-23 NOTE — SLP NOTE
SPEECH DAILY NOTE - INPATIENT    Evaluation Date: 08/23/17    ASSESSMENT & PLAN   ASSESSMENT  Pt referred for dysphagia tx to assess with diet recommendation, r/o aspiration risk and ensure that pt and family are f/u with aspiration precautions.   Pt is on and thin liquids without overt signs or symptoms of aspiration with 100 % accuracy over 2 session(s).    Goal #2 The patient/family/caregiver will demonstrate understanding and implementation of aspiration precautions and swallow strategies independently ov

## 2017-08-24 NOTE — DISCHARGE SUMMARY
[facility] HOSPITALIST  DISCHARGE SUMMARY     Marcela Ziegler Patient Status:  Inpatient    1955 MRN [id number]   [facility] 7NE-A Attending No att. providers found   [facility] Day # 2 PCP Tomy Garcia MD     Date of Admission: involving the left inferior cerebellar hemisphere and possibly the left lateral  Medulla. 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 No evidence of acute hemorrhage.   Less than 50% Prescription details   atorvastatin 80 MG Tabs  Commonly known as:  LIPITOR      Take 1 tablet (80 mg total) by mouth nightly.    Quantity:  30 tablet  Refills:  2     enoxaparin sodium 100 MG/ML Soln  Commonly known as:  LOVENOX      Inject 1 mL (100 mg to (36.4 °C)-98.2 °F (36.8 °C)] 97.8 °F (36.6 °C)  Pulse:  [66-74] 66  Resp:  [17-18] 17  BP: (136-138)/(78-84) 136/78    Physical Exam:    General: No acute distress. Respiratory: Clear to auscultation bilaterally. No wheezes. No rhonchi.   Cardiovascular:

## 2017-09-25 ENCOUNTER — TELEPHONE (OUTPATIENT)
Dept: INTERNAL MEDICINE CLINIC | Facility: CLINIC | Age: 62
End: 2017-09-25

## 2017-09-25 NOTE — TELEPHONE ENCOUNTER
He is coming in for a routine f// u today  He was hosp at [facility] w/major stroke and went to rehab at [facility]  I have no papers from [facility] and need extended visit w/him  When was he discharged? Can we do TCM?   Needs HFU appt, not 20 minute f/u

## 2017-09-25 NOTE — TELEPHONE ENCOUNTER
Spoke with pt. Advised that pt needs extended visit for HFU. Pt voiced understanding. Cancelled appt for today and rescheduled for 10/3/17 (40 minutes). Offered OV 9/28/17 but pt refused. States that that day is the last day of rehab for him.     Pt confir

## 2017-09-26 NOTE — TELEPHONE ENCOUNTER
Incoming (mail or fax):  fax  Received from:  [facility]  Documentation given to:  Dr Sidhu Quiet

## 2017-09-28 ENCOUNTER — TELEPHONE (OUTPATIENT)
Dept: INTERNAL MEDICINE CLINIC | Facility: CLINIC | Age: 62
End: 2017-09-28

## 2017-09-28 NOTE — TELEPHONE ENCOUNTER
Incoming (mail or fax):  fax  Received from:  [facility]  Documentation given to:  Dr Wang Rajan

## 2017-10-03 ENCOUNTER — OFFICE VISIT (OUTPATIENT)
Dept: NEUROLOGY | Facility: CLINIC | Age: 62
End: 2017-10-03

## 2017-10-03 ENCOUNTER — OFFICE VISIT (OUTPATIENT)
Dept: INTERNAL MEDICINE CLINIC | Facility: CLINIC | Age: 62
End: 2017-10-03

## 2017-10-03 VITALS
HEIGHT: 66.5 IN | HEART RATE: 64 BPM | WEIGHT: 215.31 LBS | BODY MASS INDEX: 34.19 KG/M2 | OXYGEN SATURATION: 98 % | TEMPERATURE: 98 F | DIASTOLIC BLOOD PRESSURE: 60 MMHG | RESPIRATION RATE: 16 BRPM | SYSTOLIC BLOOD PRESSURE: 110 MMHG

## 2017-10-03 VITALS
DIASTOLIC BLOOD PRESSURE: 70 MMHG | WEIGHT: 215 LBS | HEART RATE: 64 BPM | BODY MASS INDEX: 34 KG/M2 | SYSTOLIC BLOOD PRESSURE: 120 MMHG

## 2017-10-03 DIAGNOSIS — R73.02 IGT (IMPAIRED GLUCOSE TOLERANCE): ICD-10-CM

## 2017-10-03 DIAGNOSIS — E78.5 DYSLIPIDEMIA: ICD-10-CM

## 2017-10-03 DIAGNOSIS — I25.10 CORONARY ARTERY DISEASE INVOLVING NATIVE CORONARY ARTERY OF NATIVE HEART WITHOUT ANGINA PECTORIS: ICD-10-CM

## 2017-10-03 DIAGNOSIS — I10 ESSENTIAL HYPERTENSION: ICD-10-CM

## 2017-10-03 DIAGNOSIS — I63.9 ACUTE CVA (CEREBROVASCULAR ACCIDENT) (HCC): ICD-10-CM

## 2017-10-03 DIAGNOSIS — N18.30 CKD (CHRONIC KIDNEY DISEASE), STAGE III (HCC): ICD-10-CM

## 2017-10-03 DIAGNOSIS — G47.33 OSA (OBSTRUCTIVE SLEEP APNEA): ICD-10-CM

## 2017-10-03 DIAGNOSIS — Z09 HOSPITAL DISCHARGE FOLLOW-UP: Primary | ICD-10-CM

## 2017-10-03 DIAGNOSIS — E78.00 HIGH CHOLESTEROL: ICD-10-CM

## 2017-10-03 DIAGNOSIS — I65.01 OCCLUSION OF RIGHT VERTEBRAL ARTERY: ICD-10-CM

## 2017-10-03 DIAGNOSIS — I77.74 DISSECTION, VERTEBRAL ARTERY (HCC): Primary | ICD-10-CM

## 2017-10-03 PROCEDURE — 99215 OFFICE O/P EST HI 40 MIN: CPT | Performed by: INTERNAL MEDICINE

## 2017-10-03 PROCEDURE — 99213 OFFICE O/P EST LOW 20 MIN: CPT | Performed by: PHYSICIAN ASSISTANT

## 2017-10-03 NOTE — PROGRESS NOTES
Micah Flores is a 65year old male. 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  Micah Flores Is feeling b Dyslipidemia     BPH (benign prostatic hyperplasia)     IGT (impaired glucose tolerance)     RLS (restless legs syndrome)     Benign fasciculation-cramp syndrome (HCC)     Drug-induced erectile dysfunction     AMALIA (obstructive sleep apnea)     Benign essen puts him at significant risk      Orders Placed This Encounter      Lipid Panel      CMP      No prescriptions requested or ordered in this encounter    None    There are no Patient Instructions on file for this visit.     Return in about 3 months (around 1

## 2017-10-03 NOTE — PATIENT INSTRUCTIONS
Refill policies:    • Allow 2-3 business days for refills; controlled substances may take longer.   • Contact your pharmacy at least 5 days prior to running out of medication and have them send an electronic request or submit request through the [facility] have a procedure or additional testing performed. [facility]) will contact your insurance carrier to obtain pre-certification or prior authorization.     Unfortunately, [facility] has seen an increase in denial of payment even though the p

## 2017-10-03 NOTE — PROGRESS NOTES
Christina Guzman 36 Patient Status:  No patient class for patient encounter    1955 MRN [id number]   Location [address] [place] [address], [address] [place] [address] Drive, [address] [place] [address] Attending No att. providers found   [facility] Day # 0 PCP B • Diverticulitis    • High blood pressure    • HTN (hypertension)    • IGT (impaired glucose tolerance) 6/9/2015   • AMALIA on CPAP 4/30/2015   • Other and unspecified hyperlipidemia    • RLS (restless legs syndrome) 12/3/2015      Past Surgical History:  N tablet (10 mg total) by mouth once daily. , Disp: 30 tablet, Rfl: 5  •  Choline Fenofibrate (FENOFIBRIC ACID) 135 MG Oral Capsule Delayed Release, Take 1 capsule by mouth once daily. , Disp: 30 capsule, Rfl: 5  •  rOPINIRole HCl 0.5 MG Oral Tab, Take 1 in th

## 2017-10-04 RX ORDER — LISINOPRIL 10 MG/1
10 TABLET ORAL
Qty: 30 TABLET | Refills: 5 | Status: SHIPPED | OUTPATIENT
Start: 2017-10-04 | End: 2017-10-05

## 2017-10-04 RX ORDER — ATORVASTATIN CALCIUM 80 MG/1
80 TABLET, FILM COATED ORAL NIGHTLY
Qty: 30 TABLET | Refills: 2 | Status: SHIPPED | OUTPATIENT
Start: 2017-10-04 | End: 2017-10-05

## 2017-10-04 RX ORDER — METOPROLOL SUCCINATE 50 MG/1
50 TABLET, EXTENDED RELEASE ORAL
Qty: 30 TABLET | Refills: 5 | Status: SHIPPED | OUTPATIENT
Start: 2017-10-04 | End: 2017-10-05

## 2017-10-04 RX ORDER — FENOFIBRIC ACID 135 MG/1
1 CAPSULE, DELAYED RELEASE ORAL
Qty: 30 CAPSULE | Refills: 5 | Status: SHIPPED | OUTPATIENT
Start: 2017-10-04 | End: 2017-10-05

## 2017-10-04 NOTE — TELEPHONE ENCOUNTER
Last OV pertinent to medication: 10/03/17  Last refill date: 03/13/17; 08/22/17     #/refills: 90/5; 30/2  When pt was asked to return for OV: Return in 3 months for high cholesterol, stroke. Upcoming appt/reason: 01/04/18 f/u on high cholesterol, stroke.

## 2017-10-05 ENCOUNTER — TELEPHONE (OUTPATIENT)
Dept: NEUROLOGY | Facility: CLINIC | Age: 62
End: 2017-10-05

## 2017-10-05 ENCOUNTER — TELEPHONE (OUTPATIENT)
Dept: INTERNAL MEDICINE CLINIC | Facility: CLINIC | Age: 62
End: 2017-10-05

## 2017-10-05 RX ORDER — LISINOPRIL 10 MG/1
10 TABLET ORAL
Qty: 30 TABLET | Refills: 5 | Status: SHIPPED | OUTPATIENT
Start: 2017-10-05 | End: 2018-07-09

## 2017-10-05 RX ORDER — METOPROLOL SUCCINATE 50 MG/1
50 TABLET, EXTENDED RELEASE ORAL
Qty: 30 TABLET | Refills: 5 | Status: SHIPPED | OUTPATIENT
Start: 2017-10-05 | End: 2018-07-09

## 2017-10-05 RX ORDER — ASPIRIN 325 MG
325 TABLET ORAL DAILY
Qty: 30 TABLET | Refills: 2 | Status: ON HOLD | OUTPATIENT
Start: 2017-10-05 | End: 2020-09-15 | Stop reason: ALTCHOICE

## 2017-10-05 RX ORDER — ATORVASTATIN CALCIUM 80 MG/1
80 TABLET, FILM COATED ORAL NIGHTLY
Qty: 30 TABLET | Refills: 2 | Status: SHIPPED | OUTPATIENT
Start: 2017-10-05 | End: 2018-01-31

## 2017-10-05 RX ORDER — ASPIRIN 325 MG
325 TABLET ORAL DAILY
Qty: 30 TABLET | Refills: 2 | Status: SHIPPED | OUTPATIENT
Start: 2017-10-05 | End: 2017-10-05

## 2017-10-05 RX ORDER — ENOXAPARIN SODIUM 100 MG/ML
1 INJECTION SUBCUTANEOUS EVERY 12 HOURS SCHEDULED
Qty: 60 ML | Refills: 2 | Status: SHIPPED | OUTPATIENT
Start: 2017-10-05 | End: 2018-01-11

## 2017-10-05 RX ORDER — FENOFIBRIC ACID 135 MG/1
1 CAPSULE, DELAYED RELEASE ORAL
Qty: 30 CAPSULE | Refills: 5 | Status: SHIPPED | OUTPATIENT
Start: 2017-10-05 | End: 2018-07-09

## 2017-10-05 RX ORDER — ROPINIROLE 0.5 MG/1
TABLET, FILM COATED ORAL
Qty: 90 TABLET | Refills: 5 | Status: SHIPPED | OUTPATIENT
Start: 2017-10-05 | End: 2019-01-07

## 2017-10-05 RX ORDER — ROPINIROLE 0.5 MG/1
TABLET, FILM COATED ORAL
Qty: 90 TABLET | Refills: 5 | Status: SHIPPED | OUTPATIENT
Start: 2017-10-05 | End: 2017-10-05

## 2017-11-21 ENCOUNTER — TELEPHONE (OUTPATIENT)
Dept: NEUROLOGY | Facility: CLINIC | Age: 62
End: 2017-11-21

## 2017-11-21 ENCOUNTER — HOSPITAL ENCOUNTER (OUTPATIENT)
Dept: MRI IMAGING | Facility: HOSPITAL | Age: 62
Discharge: HOME | End: 2017-11-21
Attending: PHYSICIAN ASSISTANT
Payer: COMMERCIAL

## 2017-11-21 ENCOUNTER — OFFICE VISIT (OUTPATIENT)
Dept: NEUROLOGY | Facility: CLINIC | Age: 62
End: 2017-11-21

## 2017-11-21 VITALS
DIASTOLIC BLOOD PRESSURE: 70 MMHG | HEART RATE: 78 BPM | HEIGHT: 66 IN | BODY MASS INDEX: 35.52 KG/M2 | WEIGHT: 221 LBS | SYSTOLIC BLOOD PRESSURE: 112 MMHG

## 2017-11-21 DIAGNOSIS — E78.5 DYSLIPIDEMIA: ICD-10-CM

## 2017-11-21 DIAGNOSIS — E66.9 OBESITY (BMI 30-39.9): ICD-10-CM

## 2017-11-21 DIAGNOSIS — I77.74 DISSECTION, VERTEBRAL ARTERY (HCC): ICD-10-CM

## 2017-11-21 DIAGNOSIS — I65.01 OCCLUSION OF RIGHT VERTEBRAL ARTERY: Primary | ICD-10-CM

## 2017-11-21 DIAGNOSIS — G44.011 INTRACTABLE EPISODIC CLUSTER HEADACHE: ICD-10-CM

## 2017-11-21 DIAGNOSIS — G25.81 RLS (RESTLESS LEGS SYNDROME): ICD-10-CM

## 2017-11-21 DIAGNOSIS — I63.9 ACUTE CVA (CEREBROVASCULAR ACCIDENT) (HCC): ICD-10-CM

## 2017-11-21 DIAGNOSIS — R25.3 BENIGN FASCICULATION-CRAMP SYNDROME: ICD-10-CM

## 2017-11-21 PROBLEM — G44.009 CLUSTER HEADACHES: Status: ACTIVE | Noted: 2017-11-21

## 2017-11-21 PROCEDURE — A9575 INJ GADOTERATE MEGLUMI 0.1ML: HCPCS | Performed by: PHYSICIAN ASSISTANT

## 2017-11-21 PROCEDURE — 99215 OFFICE O/P EST HI 40 MIN: CPT | Performed by: OTHER

## 2017-11-21 PROCEDURE — 70546 MR ANGIOGRAPH HEAD W/O&W/DYE: CPT | Performed by: PHYSICIAN ASSISTANT

## 2017-11-21 PROCEDURE — 70549 MR ANGIOGRAPH NECK W/O&W/DYE: CPT | Performed by: PHYSICIAN ASSISTANT

## 2017-11-21 PROCEDURE — 70553 MRI BRAIN STEM W/O & W/DYE: CPT | Performed by: PHYSICIAN ASSISTANT

## 2017-11-21 RX ORDER — TOPIRAMATE 25 MG/1
25 TABLET ORAL 2 TIMES DAILY
Qty: 1120 TABLET | Refills: 5 | Status: SHIPPED | OUTPATIENT
Start: 2017-11-21 | End: 2018-02-22

## 2017-11-21 NOTE — PATIENT INSTRUCTIONS
Refill policies:    • Allow 2-3 business days for refills; controlled substances may take longer.   • Contact your pharmacy at least 5 days prior to running out of medication and have them send an electronic request or submit request through the [facility] have a procedure or additional testing performed. [facility]) will contact your insurance carrier to obtain pre-certification or prior authorization.     Unfortunately, [facility] has seen an increase in denial of payment even though the p

## 2017-11-21 NOTE — TELEPHONE ENCOUNTER
----- Message from Daniel Aiken MD sent at 11/21/2017  1:17 PM CST -----  Unremarkable, nothing new, stable, will review at visit

## 2017-11-21 NOTE — PROGRESS NOTES
Richard 1827   Neurology; INITIAL CLINIC VISIT  2017, 4:04 PM     Jaimee Sin Patient Status:  No patient class for patient encounter    1955 MRN [id number]   Location [address] [place] [address] only Neurological symptom at this time is mild dizziness upon standing.  Even since his stroke in august,  he has been having intermittent HA moderate degree, it changed to side to side, it was severe enough, made him watering in his eyes, lasting few hours Delayed Release Take 1 capsule by mouth once daily. Disp: 30 capsule Rfl: 5   enoxaparin sodium 100 MG/ML Subcutaneous Solution Inject 1 mL (100 mg total) into the skin every 12 (twelve) hours.  Disp: 60 mL Rfl: 2   lisinopril 10 MG Oral Tab Take 1 tablet ( normal.  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      Dictated by: Rayne Schmidt MD on 11/21/2017 at 12:49       Approved by: Emerald Cannon

## 2017-11-21 NOTE — TELEPHONE ENCOUNTER
Patient seen in office today. Dr. Carlos Veloz requesting letter be drafted for patient to return to work. Letter reviewed and signed by doctor. Provided to patient.

## 2017-12-04 ENCOUNTER — TELEPHONE (OUTPATIENT)
Dept: NEUROLOGY | Facility: CLINIC | Age: 62
End: 2017-12-04

## 2017-12-04 NOTE — TELEPHONE ENCOUNTER
Letter updated and signed by Dr. Emiliano Kate. Printed copy faxed to [facility] with fax confirmation rec'd. Left message for Eyal Marcum and [facility] to inform her that letter was faxed. Requested she c/b office if she needed anything further.     Letter rabia

## 2017-12-04 NOTE — TELEPHONE ENCOUNTER
Pt requires a more detailed letter for work, outlining specific work restrictions. Pt reports that the specific clarifications he needs are as follows:    1. What is the maximum weight he can lift? 2.  Can he work with his hands above his shoulders to be

## 2018-01-04 ENCOUNTER — OFFICE VISIT (OUTPATIENT)
Dept: INTERNAL MEDICINE CLINIC | Facility: CLINIC | Age: 63
End: 2018-01-04

## 2018-01-04 VITALS
SYSTOLIC BLOOD PRESSURE: 108 MMHG | OXYGEN SATURATION: 98 % | RESPIRATION RATE: 16 BRPM | WEIGHT: 215.63 LBS | BODY MASS INDEX: 34.65 KG/M2 | HEIGHT: 66 IN | DIASTOLIC BLOOD PRESSURE: 62 MMHG | HEART RATE: 71 BPM | TEMPERATURE: 98 F

## 2018-01-04 DIAGNOSIS — R19.7 DIARRHEA, UNSPECIFIED TYPE: Primary | ICD-10-CM

## 2018-01-04 PROCEDURE — 99214 OFFICE O/P EST MOD 30 MIN: CPT | Performed by: INTERNAL MEDICINE

## 2018-01-04 NOTE — PROGRESS NOTES
Barbi Rick is a 65year old male.  To F/U from last visit regardingHTN, high chol and hx of CVA  HPI:    Interim history:he is seeing neuro, lovenox for 3 more mos  He gets nauseous when he turns his head, his left face still gets numb  He did no (coronary artery disease)     HTN (hypertension)     Dyslipidemia     BPH (benign prostatic hyperplasia)     IGT (impaired glucose tolerance)     RLS (restless legs syndrome)     Benign fasciculation-cramp syndrome (HCC)     Drug-induced erectile dysfuncti 5.7 (H) <5.7 %   Estimated Average Glucose 117 68 - 126 mg/dL   -POTASSIUM   Result Value Ref Range   Potassium 4.2 3.6 - 5.1 mmol/L   -URINALYSIS WITH CULTURE REFLEX   Result Value Ref Range   Urine Color Yellow Yellow   Clarity Urine Clear Clear   Spec G x10(6)uL   HGB 18.0 (H) 13.0 - 17.0 g/dL   HCT 52.3 37.0 - 53.0 %   .0 150.0 - 450.0 10(3)uL   MCV 87.9 80.0 - 99.0 fL   MCH 30.3 27.0 - 33.2 pg   MCHC 34.4 31.0 - 37.0 g/dL   RDW 13.0 11.5 - 16.0 %   RDW-SD 41.5 35.1 - 46.3 fL   Neutrophil Absolute requested or ordered in this encounter    Imaging & Consults:  None    No Follow-up on file. There are no Patient Instructions on file for this visit. The patient indicates understanding of these issues and agrees to the plan.

## 2018-01-08 RX ORDER — ENOXAPARIN SODIUM 100 MG/ML
INJECTION SUBCUTANEOUS
Qty: 60 ML | Refills: 1 | OUTPATIENT
Start: 2018-01-08

## 2018-01-12 RX ORDER — ENOXAPARIN SODIUM 100 MG/ML
INJECTION SUBCUTANEOUS
Qty: 60 ML | Refills: 1 | Status: SHIPPED | OUTPATIENT
Start: 2018-01-12 | End: 2018-03-09 | Stop reason: ALTCHOICE

## 2018-01-12 NOTE — TELEPHONE ENCOUNTER
Last OV relevant to medication: 10/03/2017, 1/4/2018 - Diarrhea  Last refill date: 10/5/2017     #/refills: 60 mL/0  When pt was asked to return for OV: 3 months - High Chol, Stroke  Upcoming appt/reason: none

## 2018-01-31 RX ORDER — ATORVASTATIN CALCIUM 80 MG/1
TABLET, FILM COATED ORAL
Qty: 30 TABLET | Refills: 5 | Status: SHIPPED | OUTPATIENT
Start: 2018-01-31 | End: 2018-11-24

## 2018-02-02 ENCOUNTER — APPOINTMENT (OUTPATIENT)
Dept: LAB | Age: 63
End: 2018-02-02
Attending: INTERNAL MEDICINE
Payer: COMMERCIAL

## 2018-02-02 ENCOUNTER — PRIOR ORIGINAL RECORDS (OUTPATIENT)
Dept: OTHER | Age: 63
End: 2018-02-02

## 2018-02-02 LAB
ALBUMIN SERPL-MCNC: 4.3 G/DL (ref 3.5–4.8)
ALP LIVER SERPL-CCNC: 41 U/L (ref 45–117)
ALT SERPL-CCNC: 23 U/L (ref 17–63)
AST SERPL-CCNC: 15 U/L (ref 15–41)
BILIRUB SERPL-MCNC: 0.5 MG/DL (ref 0.1–2)
BUN BLD-MCNC: 19 MG/DL (ref 8–20)
CALCIUM BLD-MCNC: 9 MG/DL (ref 8.3–10.3)
CHLORIDE: 108 MMOL/L (ref 101–111)
CHOLEST SMN-MCNC: 105 MG/DL (ref ?–200)
CO2: 24 MMOL/L (ref 22–32)
CREAT BLD-MCNC: 1.91 MG/DL (ref 0.7–1.3)
GLUCOSE BLD-MCNC: 81 MG/DL (ref 70–99)
HDLC SERPL-MCNC: 26 MG/DL (ref 45–?)
HDLC SERPL: 4.04 {RATIO} (ref ?–4.97)
LDLC SERPL CALC-MCNC: 43 MG/DL (ref ?–130)
M PROTEIN MFR SERPL ELPH: 7.8 G/DL (ref 6.1–8.3)
NONHDLC SERPL-MCNC: 79 MG/DL (ref ?–130)
POTASSIUM SERPL-SCNC: 4.8 MMOL/L (ref 3.6–5.1)
SODIUM SERPL-SCNC: 139 MMOL/L (ref 136–144)
TRIGL SERPL-MCNC: 182 MG/DL (ref ?–150)
VLDLC SERPL CALC-MCNC: 36 MG/DL (ref 5–40)

## 2018-02-02 PROCEDURE — 80061 LIPID PANEL: CPT | Performed by: INTERNAL MEDICINE

## 2018-02-02 PROCEDURE — 80053 COMPREHEN METABOLIC PANEL: CPT | Performed by: INTERNAL MEDICINE

## 2018-02-05 DIAGNOSIS — R94.4 DECREASED GFR: ICD-10-CM

## 2018-02-05 DIAGNOSIS — R79.89 ELEVATED SERUM CREATININE: Primary | ICD-10-CM

## 2018-02-07 ENCOUNTER — OFFICE VISIT (OUTPATIENT)
Dept: INTERNAL MEDICINE CLINIC | Facility: CLINIC | Age: 63
End: 2018-02-07

## 2018-02-07 VITALS
WEIGHT: 222 LBS | DIASTOLIC BLOOD PRESSURE: 64 MMHG | RESPIRATION RATE: 14 BRPM | SYSTOLIC BLOOD PRESSURE: 108 MMHG | HEIGHT: 66 IN | HEART RATE: 72 BPM | BODY MASS INDEX: 35.68 KG/M2

## 2018-02-07 DIAGNOSIS — L72.3 SEBACEOUS CYST: Primary | ICD-10-CM

## 2018-02-07 PROCEDURE — 99213 OFFICE O/P EST LOW 20 MIN: CPT | Performed by: NURSE PRACTITIONER

## 2018-02-07 RX ORDER — SULFAMETHOXAZOLE AND TRIMETHOPRIM 800; 160 MG/1; MG/1
1 TABLET ORAL 2 TIMES DAILY
Qty: 20 TABLET | Refills: 0 | Status: SHIPPED | OUTPATIENT
Start: 2018-02-07 | End: 2018-02-22 | Stop reason: ALTCHOICE

## 2018-02-07 NOTE — PROGRESS NOTES
Patient presents with: Other: draining lump on upper back x 2-3 days      HPI:  Presents with approx 1 week history of tender, draining mass to mid upper left back. Stated it has been draining bloody/pus colored drainage.  Had large amount of drainage yest daily. One bid for a week, then two tablets bid, Disp: 1120 tablet Rfl: 5   aspirin 325 MG Oral Tab Take 1 tablet (325 mg total) by mouth daily.  Disp: 30 tablet Rfl: 2   Choline Fenofibrate (FENOFIBRIC ACID) 135 MG Oral Capsule Delayed Release Take 1 capsu file.  Patient Instructions                           Apply warm compress to back  2-3 times daily for 10-15 minutes each time. See Dr. Katherine Warner as referred. All questions were answered and the patient understands the plan.

## 2018-02-07 NOTE — PATIENT INSTRUCTIONS
Apply warm compress to back  2-3 times daily for 10-15 minutes each time. See Dr. Trae Kong as referred.

## 2018-02-22 ENCOUNTER — OFFICE VISIT (OUTPATIENT)
Dept: NEUROLOGY | Facility: CLINIC | Age: 63
End: 2018-02-22

## 2018-02-22 VITALS
HEART RATE: 72 BPM | SYSTOLIC BLOOD PRESSURE: 90 MMHG | DIASTOLIC BLOOD PRESSURE: 60 MMHG | WEIGHT: 224 LBS | BODY MASS INDEX: 36 KG/M2

## 2018-02-22 DIAGNOSIS — G44.009 CLUSTER HEADACHE, NOT INTRACTABLE, UNSPECIFIED CHRONICITY PATTERN: ICD-10-CM

## 2018-02-22 DIAGNOSIS — I65.01 OCCLUSION OF RIGHT VERTEBRAL ARTERY: Primary | ICD-10-CM

## 2018-02-22 DIAGNOSIS — R41.3 MEMORY DEFICIT: ICD-10-CM

## 2018-02-22 DIAGNOSIS — R42 DIZZINESS AND GIDDINESS: ICD-10-CM

## 2018-02-22 DIAGNOSIS — I63.9 ACUTE CVA (CEREBROVASCULAR ACCIDENT) (HCC): ICD-10-CM
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
## 2018-02-22 DIAGNOSIS — G25.81 RLS (RESTLESS LEGS SYNDROME): ICD-10-CM
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
## 2018-02-22 PROCEDURE — 99215 OFFICE O/P EST HI 40 MIN: CPT | Performed by: OTHER

## 2018-02-22 NOTE — PATIENT INSTRUCTIONS
Refill policies:    • Allow 2-3 business days for refills; controlled substances may take longer.   • Contact your pharmacy at least 5 days prior to running out of medication and have them send an electronic request or submit request through the [facility] recommended that you have a procedure or additional testing performed. [facility]) will contact your insurance carrier to obtain pre-certification or prior authorization.     Unfortunately, [facility] has seen an increase in denial of paym

## 2018-02-22 NOTE — PROGRESS NOTES
Patient states he has been having dizziness and feels he is off balance, symptoms getting worse since Nov 2017

## 2018-02-22 NOTE — PROGRESS NOTES
Richard 1827   Neurology; follow up  CLINIC VISIT  2018    Kendal Gonzales Patient Status:  No patient class for patient encounter    1955 MRN [id number]   Location [address] [place] [address] Dipika Law hyperplasia) 9/19/2014   • CAD, multiple vessel 2005,7,12    nonocclusive   • Coronary atherosclerosis    • Diverticulitis    • High blood pressure    • HTN (hypertension)    • IGT (impaired glucose tolerance) 6/9/2015   • AMALIA on CPAP 4/30/2015   • Other a well, calm, normal appetite,   EYES: no visual complaints or deficits  HEENT: denies nasal congestion, sinus pain or sore throat; no  hearing loss;  RESPIRATORY: denies shortness of breath, wheezing or cough   CARDIOVASCULAR: denies chest pain, no palpitat Masseters strong, Facial sensations slightly decreased in L. Side face.         CN  VII:  Symmetric facial movement       CN VIII:  Normal hearing       CN IX, XI:  Normal Gag, uvula palate midline       CN XII:  SCM strong, equal shoulder shrug  Motor:  No EEG    MRI BRAIN, MRA BRAIN&NECK (CPT=70551/22300/50313)    Dizziness and giddiness    Fall          Impression/Plan:  (I65.01) Occlusion of right vertebral artery  (primary encounter diagnosis)  Plan: MRI BRAIN, MRA BRAIN&NECK         (CPT=70551/40168/708

## 2018-02-27 ENCOUNTER — TELEPHONE (OUTPATIENT)
Dept: INTERNAL MEDICINE CLINIC | Facility: CLINIC | Age: 63
End: 2018-02-27

## 2018-02-27 NOTE — TELEPHONE ENCOUNTER
Received notification for prior auth on Enoxaparin. Started on [email or web address]. [place] # Oxana Bricemel. Your information has been submitted to [email or web address]. [employer] is reviewing the PA request and you will receive an electronic response.   The standard fax det

## 2018-02-28 NOTE — TELEPHONE ENCOUNTER
Received fax from [employer] Enoxaparin sodium denied d/t not approved dosing by FDA. Please review form and advise. Form in folder. Thank you.

## 2018-03-01 NOTE — TELEPHONE ENCOUNTER
According to Dr Manuelito Jerome last note she d/c'd lovenox and he should only be taking aspirin  Was this refill prompted automatically by [facility] or did pt request the refill from them?    Please investigate

## 2018-03-01 NOTE — TELEPHONE ENCOUNTER
Spoke with pt. States that Sunny Oleary did discontinue the Enoxaparin Sodium injection. States that he is taking the  mg daily. Med list updated. Called [employer] at ([phone]). Spoke with Gogo Edwards.   Advised that pt is no longer

## 2018-03-05 ENCOUNTER — NURSE ONLY (OUTPATIENT)
Dept: ELECTROPHYSIOLOGY | Facility: HOSPITAL | Age: 63
End: 2018-03-05
Attending: Other
Payer: COMMERCIAL

## 2018-03-05 ENCOUNTER — OFFICE VISIT (OUTPATIENT)
Dept: SURGERY | Facility: CLINIC | Age: 63
End: 2018-03-05

## 2018-03-05 VITALS — SYSTOLIC BLOOD PRESSURE: 130 MMHG | HEART RATE: 68 BPM | DIASTOLIC BLOOD PRESSURE: 80 MMHG

## 2018-03-05 DIAGNOSIS — R41.3 MEMORY DEFICIT: ICD-10-CM

## 2018-03-05 DIAGNOSIS — I65.01: Primary | ICD-10-CM

## 2018-03-05 PROCEDURE — 95819 EEG AWAKE AND ASLEEP: CPT | Performed by: OTHER

## 2018-03-05 PROCEDURE — 99202 OFFICE O/P NEW SF 15 MIN: CPT | Performed by: RADIOLOGY

## 2018-03-05 NOTE — PATIENT INSTRUCTIONS
Refill policies:    • Allow 2-3 business days for refills; controlled substances may take longer.   • Contact your pharmacy at least 5 days prior to running out of medication and have them send an electronic request or submit request through the [facility] recommended that you have a procedure or additional testing performed. [facility]) will contact your insurance carrier to obtain pre-certification or prior authorization.     Unfortunately, [facility] has seen an increase in denial of paym

## 2018-03-05 NOTE — PROGRESS NOTES
Review of Systems:    Hand Dominance: right  General: fatigue  Neuro: no symptoms reported  Head: headache, dizziness/spinning and ringing in ears  Musculoskeletal: no symptoms reported  Cardiovascular: no symptoms reported  Gastrointestinal: no symptoms r

## 2018-03-07 NOTE — PROCEDURES
Electroencephalography      Reason for the test:  DX: MEMORY DEFICIT  HX:PT IS A 61YO MALE WHO PRESENTS WITH MEMORY LOSS AND H specialist  [employer]  1:04 PM

## 2018-03-08 ENCOUNTER — TELEPHONE (OUTPATIENT)
Dept: INTERNAL MEDICINE CLINIC | Facility: CLINIC | Age: 63
End: 2018-03-08

## 2018-03-08 ENCOUNTER — HOSPITAL ENCOUNTER (OUTPATIENT)
Dept: MRI IMAGING | Facility: HOSPITAL | Age: 63
Discharge: HOME | End: 2018-03-08
Attending: Other
Payer: COMMERCIAL

## 2018-03-08 DIAGNOSIS — I65.01 OCCLUSION OF RIGHT VERTEBRAL ARTERY: ICD-10-CM

## 2018-03-08 DIAGNOSIS — R41.3 MEMORY DEFICIT: ICD-10-CM

## 2018-03-08 PROCEDURE — 70544 MR ANGIOGRAPHY HEAD W/O DYE: CPT | Performed by: OTHER

## 2018-03-08 PROCEDURE — 70547 MR ANGIOGRAPHY NECK W/O DYE: CPT | Performed by: OTHER

## 2018-03-08 PROCEDURE — 70551 MRI BRAIN STEM W/O DYE: CPT | Performed by: OTHER

## 2018-03-08 NOTE — TELEPHONE ENCOUNTER
Received fax from Dr. Fransisca Diaz re: EEG done on 3/7/18. Impression: Normal reading for pt's age without epileptiform seen. To consult folder.

## 2018-03-09 NOTE — TELEPHONE ENCOUNTER
Incoming (mail or fax): Fax  Received from:  Prior Authorization for Enoxaparin Sodium  Documentation given to:  [id number] fax bin.

## 2018-03-09 NOTE — TELEPHONE ENCOUNTER
Duplicate prior auth for generic lovonox inj. See Chrissie's note below. See Dr Vicky Elliott note from 2/22/18. Med was d/c'd. Faxed back response to [place] advising med discontinued per Dr Donna Acosta. Med list updated.

## 2018-03-12 ENCOUNTER — TELEPHONE (OUTPATIENT)
Dept: INTERNAL MEDICINE CLINIC | Facility: CLINIC | Age: 63
End: 2018-03-12

## 2018-03-12 ENCOUNTER — TELEPHONE (OUTPATIENT)
Dept: NEUROLOGY | Facility: CLINIC | Age: 63
End: 2018-03-12

## 2018-03-12 DIAGNOSIS — I63.9 ACUTE CVA (CEREBROVASCULAR ACCIDENT) (HCC): Primary | ICD-10-CM

## 2018-03-12 NOTE — TELEPHONE ENCOUNTER
Spoke with patient and relayed results below. Pt is wondering what next steps are, given results or MRI. States he saw ANDERSON last week (before he had MRI done) and was told surgery wasn't necessary. Dr. Temo Lucio notes are not completed yet.   But MRI resul

## 2018-03-13 NOTE — TELEPHONE ENCOUNTER
Referral order has been placed and new work status letter drafted. Both have been endorsed to Dr. Urena Or for review.

## 2018-03-13 NOTE — TELEPHONE ENCOUNTER
Per Dr. Pérez Gomes edited the letter and would like patient to have a Functional capacity exam.     Functional capacity exam- has been ordered   Letter has been revised.

## 2018-03-13 NOTE — TELEPHONE ENCOUNTER
I called him this am at length of time, informed him MRI findings, no major change in posterior circulation of stenosis, likely small vessel disease, on ASA daily now, he saw ANDERSON , who does not feel anything need to be done,   as far as his constant worsen

## 2018-03-14 NOTE — TELEPHONE ENCOUNTER
Letter signed by Dr. Bonny Price. Letter and copies of both referrals placed in outgoing mail to pts home address.

## 2018-03-14 NOTE — TELEPHONE ENCOUNTER
Revised letter printed and endorsed to Dr. Carlos Veloz for signature. Spoke with patient and relayed need for FCE and f/[place] [facility]   Also explained that pts current work restrictions will remain in place until these requirements

## 2018-03-19 NOTE — TELEPHONE ENCOUNTER
Patient states he has not received letter yet. Informed him to give it a few more days, if he does not receive later this week to call office. Verbalized understanding.

## 2018-03-20 ENCOUNTER — MYAURORA ACCOUNT LINK (OUTPATIENT)
Dept: OTHER | Age: 63
End: 2018-03-20

## 2018-03-20 ENCOUNTER — PRIOR ORIGINAL RECORDS (OUTPATIENT)
Dept: OTHER | Age: 63
End: 2018-03-20

## 2018-03-25 NOTE — PROGRESS NOTES
HPI:    Patient ID: Ifeanyi Tirado is a 65year old male referred by neurology to assess any indication for endovascular workup / intervention.     HPI     Suffered a stroke last year with poor balance and dizziness and memory loss and had acute L ce (hypertension)    • IGT (impaired glucose tolerance) 6/9/2015   • AMALIA on CPAP 4/30/2015   • Other and unspecified hyperlipidemia    • RLS (restless legs syndrome) 12/3/2015     Past Surgical History:  No date: ANGIOGRAM  2009: BIOPSY OF PROSTATE,NEEDLE/PUN invasive workup by conventional angiography is not warranted. He should be treated for intracranial atherosclerosis as per neurology.     I spent 15 minutes face to face time with the patient, at least half of which was spent on patient education and coordi

## 2018-03-27 ENCOUNTER — OFFICE VISIT (OUTPATIENT)
Dept: INTERNAL MEDICINE CLINIC | Facility: CLINIC | Age: 63
End: 2018-03-27

## 2018-03-27 ENCOUNTER — TELEPHONE (OUTPATIENT)
Dept: INTERNAL MEDICINE CLINIC | Facility: CLINIC | Age: 63
End: 2018-03-27

## 2018-03-27 VITALS
TEMPERATURE: 98 F | HEART RATE: 71 BPM | HEIGHT: 66 IN | RESPIRATION RATE: 14 BRPM | OXYGEN SATURATION: 98 % | SYSTOLIC BLOOD PRESSURE: 128 MMHG | BODY MASS INDEX: 36.58 KG/M2 | DIASTOLIC BLOOD PRESSURE: 80 MMHG | WEIGHT: 227.63 LBS

## 2018-03-27 DIAGNOSIS — I63.9 ACUTE CVA (CEREBROVASCULAR ACCIDENT) (HCC): ICD-10-CM

## 2018-03-27 DIAGNOSIS — I10 ESSENTIAL HYPERTENSION: ICD-10-CM

## 2018-03-27 DIAGNOSIS — R05.9 COUGH: ICD-10-CM

## 2018-03-27 DIAGNOSIS — R42 DIZZINESSES: Primary | ICD-10-CM

## 2018-03-27 DIAGNOSIS — I65.01 OCCLUSION OF RIGHT VERTEBRAL ARTERY: ICD-10-CM

## 2018-03-27 LAB
ALBUMIN: 4.3 G/DL
ALKALINE PHOSPHATATE(ALK PHOS): 41 IU/L
BILIRUBIN TOTAL: 0.5 MG/DL
BUN: 19 MG/DL
CALCIUM: 9 MG/DL
CHLORIDE: 108 MEQ/L
CHOLESTEROL, TOTAL: 105 MG/DL
CREATININE, SERUM: 1.91 MG/DL
GLUCOSE: 81 MG/DL
HDL CHOLESTEROL: 26 MG/DL
LDL CHOLESTEROL: 43 MG/DL
POTASSIUM, SERUM: 4.8 MEQ/L
PROTEIN, TOTAL: 7.8 G/DL
SGOT (AST): 15 IU/L
SGPT (ALT): 23 IU/L
SODIUM: 139 MEQ/L
TRIGLYCERIDES: 182 MG/DL

## 2018-03-27 PROCEDURE — 99214 OFFICE O/P EST MOD 30 MIN: CPT | Performed by: INTERNAL MEDICINE

## 2018-03-27 RX ORDER — AZITHROMYCIN 250 MG/1
TABLET, FILM COATED ORAL
Qty: 6 TABLET | Refills: 0 | Status: SHIPPED | OUTPATIENT
Start: 2018-03-27 | End: 2018-04-05

## 2018-03-27 NOTE — PROGRESS NOTES
Bisi Benítez is a 65year old male  Patient presents with:  Stroke: Discussion of Concerns  Cold: x 2 months      HPI:   Stroke august, extenisve rehab  Unable to perform job duties at [employer] due to head heaviness and dizziness w/exertion.  Kolby Venegas Dizziness and giddiness     Fall     Social History:  Smoking status: Former Smoker                                                              Packs/day: 0.00      Years: 20.00        Types: Cigarettes  Smokeless tobacco: Never Used azithromycin (ZITHROMAX Z-LUKAS) 250 MG Oral Tab 6 tablet 0      Sig: Take two tablets by mouth today, then one tablet daily. Imaging & Consults:  None    No Follow-up on file. There are no Patient Instructions on file for this visit.        The

## 2018-03-27 NOTE — TELEPHONE ENCOUNTER
Called Dr Clint Benitez office (cardiology, Phone: [phone] ), was transferred to medical records and had to leave detailed voicemail requesting Dr Renny Stevenson recent progress notes for pt. To fax notes to us or call us back if any questions.

## 2018-03-28 ENCOUNTER — TELEPHONE (OUTPATIENT)
Dept: INTERNAL MEDICINE CLINIC | Facility: CLINIC | Age: 63
End: 2018-03-28

## 2018-03-28 NOTE — TELEPHONE ENCOUNTER
Incoming (mail or fax):  fax  Received from:  Dr. Garay Organ  Documentation given to:  Dr. Bree Rasheed

## 2018-03-30 ENCOUNTER — PRIOR ORIGINAL RECORDS (OUTPATIENT)
Dept: OTHER | Age: 63
End: 2018-03-30

## 2018-03-30 ENCOUNTER — MYAURORA ACCOUNT LINK (OUTPATIENT)
Dept: OTHER | Age: 63
End: 2018-03-30

## 2018-03-30 ENCOUNTER — HOSPITAL ENCOUNTER (OUTPATIENT)
Dept: CARDIOLOGY CLINIC | Facility: HOSPITAL | Age: 63
Discharge: HOME | End: 2018-03-30
Attending: INTERNAL MEDICINE

## 2018-03-30 DIAGNOSIS — I65.29 STENOSIS OF CAROTID ARTERY, UNSPECIFIED LATERALITY: ICD-10-CM

## 2018-04-02 ENCOUNTER — TELEPHONE (OUTPATIENT)
Dept: INTERNAL MEDICINE CLINIC | Facility: CLINIC | Age: 63
End: 2018-04-02

## 2018-04-03 ENCOUNTER — HOSPITAL ENCOUNTER (OUTPATIENT)
Dept: MRI IMAGING | Age: 63
Discharge: HOME | End: 2018-04-03
Attending: INTERNAL MEDICINE
Payer: COMMERCIAL

## 2018-04-03 ENCOUNTER — LABORATORY ENCOUNTER (OUTPATIENT)
Dept: LAB | Age: 63
End: 2018-04-03
Attending: INTERNAL MEDICINE
Payer: COMMERCIAL

## 2018-04-03 DIAGNOSIS — S09.90XS HEADACHES DUE TO OLD HEAD INJURY: Primary | ICD-10-CM

## 2018-04-03 DIAGNOSIS — M54.2 NECK PAIN: ICD-10-CM

## 2018-04-03 DIAGNOSIS — G44.309 HEADACHES DUE TO OLD HEAD INJURY: Primary | ICD-10-CM

## 2018-04-03 PROCEDURE — 85652 RBC SED RATE AUTOMATED: CPT

## 2018-04-03 PROCEDURE — 36415 COLL VENOUS BLD VENIPUNCTURE: CPT

## 2018-04-03 PROCEDURE — 72141 MRI NECK SPINE W/O DYE: CPT | Performed by: INTERNAL MEDICINE

## 2018-04-04 NOTE — H&P
Ashu Montoyacaroline  : 1955  ACCOUNT:  [id number]  [phone]  PCP: Dr. Mccrary Began     TODAY'S DATE: 2018  DICTATED BY:  [Dr. Milton Gray: [Followup of .  CAD, of native vessels, Followup of Hyperlipidemia, mixe overweight. WEIGHT: BMI parameters reviewed and discussed. HEAD/FACE: no trauma and normocephalic. EYES: conjunctivae not injected and no xanthelasma. ENT: mucosa pink and moist. NECK: jugular venous pressure not elevated.  RESP: respirations with normal ra to date. No changes to H&P. The patient is scheduled for FRANK to assess for PFO in the setting of recent cryptogenic CVA. Patient has no difficulty swallowing. No hx of radiation to chest or neck. He does not use chronic pain meds.      Patient was cons

## 2018-04-06 ENCOUNTER — TELEPHONE (OUTPATIENT)
Dept: INTERNAL MEDICINE CLINIC | Facility: CLINIC | Age: 63
End: 2018-04-06

## 2018-04-06 NOTE — TELEPHONE ENCOUNTER
Patient is following up on a letter he thought Dr Jacobo Tellez was writing so he could stop working due to disability. He said his boss keeps asking for the letter. Please advise. Lisy Cantrell wrote a letter in February about work restrictions.

## 2018-04-06 NOTE — TELEPHONE ENCOUNTER
Called the patient regarding the below.  There is a letter written by Dr. Lizbeth Sethi on 03/13/18 with the specific work restrictions and a recommendation he see a stroke specialist in the university setting (on Epic) but the patient wants a letter stating he

## 2018-04-06 NOTE — TELEPHONE ENCOUNTER
I told him he I would read what the stroke specialist at [facility] wrote then use that to write a letter after he was seen there

## 2018-04-09 ENCOUNTER — HOSPITAL ENCOUNTER (OUTPATIENT)
Dept: CV DIAGNOSTICS | Facility: HOSPITAL | Age: 63
Discharge: HOME | End: 2018-04-09
Attending: INTERNAL MEDICINE
Payer: COMMERCIAL

## 2018-04-09 ENCOUNTER — HOSPITAL ENCOUNTER (OUTPATIENT)
Dept: INTERVENTIONAL RADIOLOGY/VASCULAR | Facility: HOSPITAL | Age: 63
Discharge: HOME | End: 2018-04-09
Attending: INTERNAL MEDICINE | Admitting: INTERNAL MEDICINE
Payer: COMMERCIAL

## 2018-04-09 VITALS
DIASTOLIC BLOOD PRESSURE: 76 MMHG | WEIGHT: 225 LBS | BODY MASS INDEX: 36.16 KG/M2 | RESPIRATION RATE: 20 BRPM | TEMPERATURE: 97 F | HEIGHT: 66 IN | OXYGEN SATURATION: 95 % | HEART RATE: 74 BPM | SYSTOLIC BLOOD PRESSURE: 130 MMHG

## 2018-04-09 DIAGNOSIS — I63.9 STROKE (HCC): ICD-10-CM

## 2018-04-09 DIAGNOSIS — I48.91 A-FIB (HCC): ICD-10-CM

## 2018-04-09 PROCEDURE — 93312 ECHO TRANSESOPHAGEAL: CPT

## 2018-04-09 PROCEDURE — 93325 DOPPLER ECHO COLOR FLOW MAPG: CPT | Performed by: INTERNAL MEDICINE

## 2018-04-09 PROCEDURE — 93320 DOPPLER ECHO COMPLETE: CPT | Performed by: INTERNAL MEDICINE

## 2018-04-09 PROCEDURE — B24BZZ4 ULTRASONOGRAPHY OF HEART WITH AORTA, TRANSESOPHAGEAL: ICD-10-PCS | Performed by: INTERNAL MEDICINE

## 2018-04-09 PROCEDURE — 99152 MOD SED SAME PHYS/QHP 5/>YRS: CPT

## 2018-04-09 RX ORDER — MIDAZOLAM HYDROCHLORIDE 1 MG/ML
INJECTION INTRAMUSCULAR; INTRAVENOUS
Status: COMPLETED
Start: 2018-04-09 | End: 2018-04-09

## 2018-04-09 RX ORDER — MIDAZOLAM HYDROCHLORIDE 1 MG/ML
1 INJECTION INTRAMUSCULAR; INTRAVENOUS ONCE AS NEEDED
Status: COMPLETED | OUTPATIENT
Start: 2018-04-09 | End: 2018-04-09

## 2018-04-09 RX ORDER — SODIUM CHLORIDE 9 MG/ML
INJECTION, SOLUTION INTRAVENOUS CONTINUOUS
Status: DISCONTINUED | OUTPATIENT
Start: 2018-04-09 | End: 2018-04-09

## 2018-04-09 RX ADMIN — MIDAZOLAM HYDROCHLORIDE 4 MG: 1 INJECTION INTRAMUSCULAR; INTRAVENOUS at 08:51:00

## 2018-04-09 NOTE — PROGRESS NOTES
FRANK at bedside with Dr Remi Fortune. Pt tolerated procedure well. See bedside sedation record. 10:15: Pt awake and ready to go home. VSS. No c/o pain or discomfort. Taking in fluids without c/o. IV dc'd with catheter intact.   Reviewed d/c instructions wit

## 2018-04-09 NOTE — TELEPHONE ENCOUNTER
Spoke with pt, advised of Dr Gail Jaimes msg below, he was frustrated as he did not recall being told he would need to see stroke specialist first and states his employer keeps asking for the letter.  Reviewed Dr Cheryl Chatterjee letter with pt which stated limitations

## 2018-04-09 NOTE — TELEPHONE ENCOUNTER
Pt called. States that he made an appointment for 4/19/18 with Dr. Sally Cook stroke specialist.   States that he realized that he cannot make that appointment and will be going out of town 4/17/18.    Pt asking if our office could get him a sooner appointment w

## 2018-04-09 NOTE — PROCEDURES
Cardiology Transesophageal Echo Note    PRE-PROCEDURE DIAGNOSIS: CVA    PROCEDURE: Transesophageal Echocardiogram.    SEDATION:   Topical spray x 1  Versed: 4 mg  Fentanyl: 100 mcg    I personally supervised the intravenous administration of   conscious se

## 2018-04-13 ENCOUNTER — TELEPHONE (OUTPATIENT)
Dept: NEUROLOGY | Facility: CLINIC | Age: 63
End: 2018-04-13

## 2018-04-13 NOTE — TELEPHONE ENCOUNTER
All Medical Records of Dr.Li Mic Esparza per request sent to Wilfrid Sierra at [address] [place] [address]. Faxed Records,Confirmation Received. KUNAL sent to scanning.

## 2018-04-17 ENCOUNTER — TELEPHONE (OUTPATIENT)
Dept: NEUROLOGY | Facility: CLINIC | Age: 63
End: 2018-04-17

## 2018-04-17 NOTE — TELEPHONE ENCOUNTER
Pt calling to report that he contacted OT to schedule FCE. They told him that the test would cost $5000 if insurance did not cover, and $2500 if insurance did cover it. He reports he cannot afford this.   Per TE dated 3/12/18, Dr. Candelario Matias wanted pt to have

## 2018-04-17 NOTE — TELEPHONE ENCOUNTER
Verbally discussed case with Dr. Delfina Milner. She said she is unable to make any further recommendations on work status.   Without a current FCE, she has no reference point from which she can base a recommendation, as pt was still reporting symptoms, but no evid

## 2018-04-17 NOTE — TELEPHONE ENCOUNTER
The reason to refer him to second neurologist in [facility], is to have them take final determination for his work status.  I can not decide based on my last note and exam. Another option is to refer back to [facility] for follow up functional  evaluation

## 2018-04-17 NOTE — TELEPHONE ENCOUNTER
Returned patient's call. He cannot remember the name of the test that Dr. Kitty Nguyễn ordered. Per TE dated 3/12/18, doctor wanted pt to have a Functional Capacity Evaluation.   Informed him of this, and provided ph# for central scheduling and advised him to co

## 2018-04-18 NOTE — TELEPHONE ENCOUNTER
Spoke with patient and explained that he needs to contact the doctor at [facility] for this information, as she is the physician who has most recently evaluated him.   He states he is trying to get full disability, and his PCP has agreed to sign the form after they

## 2018-05-18 ENCOUNTER — TELEPHONE (OUTPATIENT)
Dept: INTERNAL MEDICINE CLINIC | Facility: CLINIC | Age: 63
End: 2018-05-18

## 2018-05-18 NOTE — TELEPHONE ENCOUNTER
Patient dropped off a form for long term disability. Please advise when the form is completed and where it is going to. Patient paid the $25 form fee.

## 2018-05-21 NOTE — TELEPHONE ENCOUNTER
Marga Aleman had spoken with pt last week explaining this. Faxed form to Dr Kailyn Gilliam office, however, their notes were clear about what he needed to do. Forms faxed but not completed by PCP, mailed to address on file for pt to take elsewhere if needed.  Fee refunded

## 2018-05-21 NOTE — TELEPHONE ENCOUNTER
Per note below and Patsey Babinski, form to be mailed back to pt as cannot be completed by PCP. Mailed to address on file.   PSR - please refund $25

## 2018-05-21 NOTE — TELEPHONE ENCOUNTER
PSR spoke with patient, patient was \"told\" by Svitlana Bryson that we were faxing form over to Dr. Christopher Palacios office fax number: [phone].  Patient got upset when I told him we were mailing him the form, PSR advised we will mail him original form and fax this form

## 2018-05-25 ENCOUNTER — TELEPHONE (OUTPATIENT)
Dept: NEUROLOGY | Facility: CLINIC | Age: 63
End: 2018-05-25

## 2018-05-25 NOTE — TELEPHONE ENCOUNTER
Patient walked into office with disability paperwork. Patient stated paperwork was faxed to office by his PCP office. Unable to locate paperwork, no documentation available that paperwork has been received in office.    Patient requesting paperwork to be co

## 2018-05-29 NOTE — TELEPHONE ENCOUNTER
Paperwork signed. Patient notified of completion. Informed patient per previous discussion over the phone per TE 4/17/18- provider unable to make any further recommendations on work status.  He voiced frustration, stated he would like to  paperwo

## 2018-05-29 NOTE — TELEPHONE ENCOUNTER
Patient stopped by office to  paperwork. Offered to review paperwork with patient but he refused. Patient picked up completed and signed disability forms today.

## 2018-07-10 ENCOUNTER — PRIOR ORIGINAL RECORDS (OUTPATIENT)
Dept: OTHER | Age: 63
End: 2018-07-10

## 2018-07-10 RX ORDER — METOPROLOL SUCCINATE 50 MG/1
TABLET, EXTENDED RELEASE ORAL
Qty: 30 TABLET | Refills: 0 | Status: SHIPPED | OUTPATIENT
Start: 2018-07-10 | End: 2018-11-05

## 2018-07-10 RX ORDER — LISINOPRIL 10 MG/1
TABLET ORAL
Qty: 30 TABLET | Refills: 0 | Status: SHIPPED | OUTPATIENT
Start: 2018-07-10 | End: 2018-11-05

## 2018-08-22 ENCOUNTER — TELEPHONE (OUTPATIENT)
Dept: SURGERY | Facility: CLINIC | Age: 63
End: 2018-08-22

## 2018-08-22 NOTE — TELEPHONE ENCOUNTER
Request for records from 08/01/16 to present received, original sent to [employer], copy sent to scanning

## 2018-10-03 NOTE — TELEPHONE ENCOUNTER
BP meds. Do you want pt to follow up now for BP? Pended for 90+1 currently.     Last OV relevant to medication: 3/27/18  Last refill date: 7/10/18     #/refills: 30+0  When pt was asked to return for OV: none stated  Upcoming appt/reason: none  Lab Results

## 2018-10-03 NOTE — TELEPHONE ENCOUNTER
Pt called from pharmacy asking if refills can be done right now. Request was made today, 2 require dr approval. Cholesterol med approved per protocol, pt advised cannot fill others at this time, needs drs approval, allow 2-3 days for more rx.

## 2018-10-10 RX ORDER — METOPROLOL SUCCINATE 50 MG/1
TABLET, EXTENDED RELEASE ORAL
Qty: 30 TABLET | Refills: 0 | OUTPATIENT
Start: 2018-10-10

## 2018-10-10 RX ORDER — LISINOPRIL 10 MG/1
TABLET ORAL
Qty: 30 TABLET | Refills: 0 | OUTPATIENT
Start: 2018-10-10

## 2018-11-01 RX ORDER — ROPINIROLE 0.5 MG/1
TABLET, FILM COATED ORAL
Qty: 90 TABLET | Refills: 0 | Status: SHIPPED | OUTPATIENT
Start: 2018-11-01 | End: 2019-01-30

## 2018-11-01 NOTE — TELEPHONE ENCOUNTER
Last OV relevant to medication: 3/27/18, last appt multiple issues (patient seen for HFU on 10/3/17, last prescribed for a years worth)  Last refill date: 10/5/17     #/refills: 90/5  When pt was asked to return for OV: none  Upcoming appt/reason: none

## 2018-11-01 NOTE — TELEPHONE ENCOUNTER
Called and left message to make appt for PE and med check, informed he would not receive a full refill until that's done.

## 2018-11-01 NOTE — TELEPHONE ENCOUNTER
FYI - Patient does not want to schedule a PE at this time. Patient made an appointment for a med check on November 6th. Thanks!

## 2018-11-05 RX ORDER — METOPROLOL SUCCINATE 50 MG/1
50 TABLET, EXTENDED RELEASE ORAL
Qty: 30 TABLET | Refills: 0 | Status: SHIPPED | OUTPATIENT
Start: 2018-11-05 | End: 2019-01-29

## 2018-11-05 RX ORDER — LISINOPRIL 10 MG/1
10 TABLET ORAL
Qty: 30 TABLET | Refills: 0 | Status: SHIPPED | OUTPATIENT
Start: 2018-11-05 | End: 2019-01-29

## 2018-11-05 NOTE — TELEPHONE ENCOUNTER
Pt is waiting for insurance to activate before he can have his labs done. Not sure when this will be, hopes soon. Pt requests 30 days refills until his insurance activates. Please advise.  Thank you

## 2018-11-05 NOTE — TELEPHONE ENCOUNTER
See note below, pt is waiting for insurance to switch over and is asking for 30 day supply. Please advise.

## 2018-11-27 RX ORDER — ATORVASTATIN CALCIUM 80 MG/1
TABLET, FILM COATED ORAL
Qty: 30 TABLET | Refills: 4 | Status: SHIPPED | OUTPATIENT
Start: 2018-11-27 | End: 2019-02-12

## 2019-01-02 ENCOUNTER — HOSPITAL ENCOUNTER (EMERGENCY)
Facility: HOSPITAL | Age: 64
Discharge: HOME | End: 2019-01-02
Attending: EMERGENCY MEDICINE
Payer: COMMERCIAL

## 2019-01-02 ENCOUNTER — APPOINTMENT (OUTPATIENT)
Dept: MRI IMAGING | Facility: HOSPITAL | Age: 64
End: 2019-01-02
Attending: EMERGENCY MEDICINE
Payer: COMMERCIAL

## 2019-01-02 VITALS
OXYGEN SATURATION: 96 % | TEMPERATURE: 98 F | HEIGHT: 66 IN | SYSTOLIC BLOOD PRESSURE: 109 MMHG | BODY MASS INDEX: 36.16 KG/M2 | HEART RATE: 81 BPM | RESPIRATION RATE: 18 BRPM | DIASTOLIC BLOOD PRESSURE: 72 MMHG | WEIGHT: 225 LBS

## 2019-01-02 DIAGNOSIS — R42 VERTIGO: Primary | ICD-10-CM

## 2019-01-02 LAB
ALBUMIN SERPL-MCNC: 4.5 G/DL (ref 3.1–4.5)
ALBUMIN/GLOB SERPL: 1.1 {RATIO} (ref 1–2)
ALP LIVER SERPL-CCNC: 83 U/L (ref 45–117)
ALT SERPL-CCNC: 33 U/L (ref 17–63)
ANION GAP SERPL CALC-SCNC: 9 MMOL/L (ref 0–18)
AST SERPL-CCNC: 23 U/L (ref 15–41)
BASOPHILS # BLD AUTO: 0.08 X10(3) UL (ref 0–0.1)
BASOPHILS NFR BLD AUTO: 0.7 %
BILIRUB SERPL-MCNC: 0.9 MG/DL (ref 0.1–2)
BUN BLD-MCNC: 19 MG/DL (ref 8–20)
BUN/CREAT SERPL: 9.1 (ref 10–20)
CALCIUM BLD-MCNC: 9.8 MG/DL (ref 8.3–10.3)
CHLORIDE SERPL-SCNC: 106 MMOL/L (ref 101–111)
CO2 SERPL-SCNC: 23 MMOL/L (ref 22–32)
CREAT BLD-MCNC: 2.08 MG/DL (ref 0.7–1.3)
EOSINOPHIL # BLD AUTO: 0.14 X10(3) UL (ref 0–0.3)
EOSINOPHIL NFR BLD AUTO: 1.2 %
ERYTHROCYTE [DISTWIDTH] IN BLOOD BY AUTOMATED COUNT: 14.5 % (ref 11.5–16)
GLOBULIN PLAS-MCNC: 4.1 G/DL (ref 2.8–4.4)
GLUCOSE BLD-MCNC: 123 MG/DL (ref 70–99)
HCT VFR BLD AUTO: 53.8 % (ref 37–53)
HGB BLD-MCNC: 18.1 G/DL (ref 13–17)
IMMATURE GRANULOCYTE COUNT: 0.03 X10(3) UL (ref 0–1)
IMMATURE GRANULOCYTE RATIO %: 0.3 %
LYMPHOCYTES # BLD AUTO: 2.35 X10(3) UL (ref 0.9–4)
LYMPHOCYTES NFR BLD AUTO: 20.8 %
M PROTEIN MFR SERPL ELPH: 8.6 G/DL (ref 6.4–8.2)
MCH RBC QN AUTO: 29.7 PG (ref 27–33.2)
MCHC RBC AUTO-ENTMCNC: 33.6 G/DL (ref 31–37)
MCV RBC AUTO: 88.3 FL (ref 80–99)
MONOCYTES # BLD AUTO: 0.88 X10(3) UL (ref 0.1–1)
MONOCYTES NFR BLD AUTO: 7.8 %
NEUTROPHIL ABS PRELIM: 7.84 X10 (3) UL (ref 1.3–6.7)
NEUTROPHILS # BLD AUTO: 7.84 X10(3) UL (ref 1.3–6.7)
NEUTROPHILS NFR BLD AUTO: 69.2 %
OSMOLALITY SERPL CALC.SUM OF ELEC: 290 MOSM/KG (ref 275–295)
PLATELET # BLD AUTO: 334 10(3)UL (ref 150–450)
POTASSIUM SERPL-SCNC: 4.4 MMOL/L (ref 3.6–5.1)
RBC # BLD AUTO: 6.09 X10(6)UL (ref 4.3–5.7)
RED CELL DISTRIBUTION WIDTH-SD: 44.1 FL (ref 35.1–46.3)
SALICYLATES SERPL-MCNC: <1.7 MG/DL (ref ?–1.7)
SODIUM SERPL-SCNC: 138 MMOL/L (ref 136–144)
WBC # BLD AUTO: 11.3 X10(3) UL (ref 4–13)

## 2019-01-02 PROCEDURE — 85025 COMPLETE CBC W/AUTO DIFF WBC: CPT | Performed by: EMERGENCY MEDICINE

## 2019-01-02 PROCEDURE — 96374 THER/PROPH/DIAG INJ IV PUSH: CPT

## 2019-01-02 PROCEDURE — 99285 EMERGENCY DEPT VISIT HI MDM: CPT

## 2019-01-02 PROCEDURE — 93010 ELECTROCARDIOGRAM REPORT: CPT

## 2019-01-02 PROCEDURE — A9575 INJ GADOTERATE MEGLUMI 0.1ML: HCPCS | Performed by: EMERGENCY MEDICINE

## 2019-01-02 PROCEDURE — 80053 COMPREHEN METABOLIC PANEL: CPT | Performed by: EMERGENCY MEDICINE

## 2019-01-02 PROCEDURE — 93005 ELECTROCARDIOGRAM TRACING: CPT

## 2019-01-02 PROCEDURE — 96375 TX/PRO/DX INJ NEW DRUG ADDON: CPT

## 2019-01-02 PROCEDURE — 80329 ANALGESICS NON-OPIOID 1 OR 2: CPT | Performed by: EMERGENCY MEDICINE

## 2019-01-02 PROCEDURE — 70553 MRI BRAIN STEM W/O & W/DYE: CPT | Performed by: EMERGENCY MEDICINE

## 2019-01-02 PROCEDURE — 70546 MR ANGIOGRAPH HEAD W/O&W/DYE: CPT | Performed by: EMERGENCY MEDICINE

## 2019-01-02 PROCEDURE — 70549 MR ANGIOGRAPH NECK W/O&W/DYE: CPT | Performed by: EMERGENCY MEDICINE

## 2019-01-02 PROCEDURE — 96376 TX/PRO/DX INJ SAME DRUG ADON: CPT

## 2019-01-02 RX ORDER — MORPHINE SULFATE 4 MG/ML
4 INJECTION, SOLUTION INTRAMUSCULAR; INTRAVENOUS EVERY 30 MIN PRN
Status: DISCONTINUED | OUTPATIENT
Start: 2019-01-02 | End: 2019-01-02

## 2019-01-02 RX ORDER — MECLIZINE HYDROCHLORIDE 25 MG/1
25 TABLET ORAL 3 TIMES DAILY PRN
Qty: 20 TABLET | Refills: 0 | Status: SHIPPED | OUTPATIENT
Start: 2019-01-02 | End: 2019-10-07

## 2019-01-02 RX ORDER — HYDROCODONE BITARTRATE AND ACETAMINOPHEN 5; 325 MG/1; MG/1
1-2 TABLET ORAL EVERY 6 HOURS PRN
Qty: 10 TABLET | Refills: 0 | Status: SHIPPED | OUTPATIENT
Start: 2019-01-02 | End: 2019-01-05

## 2019-01-02 RX ORDER — MECLIZINE HYDROCHLORIDE 25 MG/1
25 TABLET ORAL ONCE
Status: COMPLETED | OUTPATIENT
Start: 2019-01-02 | End: 2019-01-02

## 2019-01-02 RX ORDER — HYDROMORPHONE HYDROCHLORIDE 1 MG/ML
1 INJECTION, SOLUTION INTRAMUSCULAR; INTRAVENOUS; SUBCUTANEOUS ONCE
Status: COMPLETED | OUTPATIENT
Start: 2019-01-02 | End: 2019-01-02

## 2019-01-02 RX ORDER — ONDANSETRON 2 MG/ML
4 INJECTION INTRAMUSCULAR; INTRAVENOUS ONCE
Status: COMPLETED | OUTPATIENT
Start: 2019-01-02 | End: 2019-01-02

## 2019-01-02 NOTE — ED INITIAL ASSESSMENT (HPI)
Pt here with c/o dizziness, intermittently X 1 year, worsens with position change. Patient states he has been unable to drive in a few weeks. Reports he was bent over yesterday, fell forward onto his head. Denies LOC. History of stroke 2017.  Patient also c

## 2019-01-03 ENCOUNTER — TELEPHONE (OUTPATIENT)
Dept: NEUROLOGY | Facility: CLINIC | Age: 64
End: 2019-01-03

## 2019-01-03 ENCOUNTER — TELEPHONE (OUTPATIENT)
Dept: INTERNAL MEDICINE CLINIC | Facility: CLINIC | Age: 64
End: 2019-01-03

## 2019-01-03 NOTE — ED PROVIDER NOTES
Patient Seen in: [facility] Emergency Department    History   Patient presents with:  Dizziness (neurologic)  Headache (neurologic)  Head Neck Injury (neurologic, musculoskeletal)    Stated Complaint: fall yesterday, hit head/ no LOC.  headaches, Magdalena Glassing SURGERY  1986    left knee   • OTHER SURGICAL HISTORY  1999    right shoulder   • SHOULDER ARTHROSCOPY  1999   • TOTAL KNEE REPLACEMENT             Social History    Tobacco Use      Smoking status: Former Smoker        Years: 20.00        Types: Cigarette to state age and month. Able to open and close eyes and hand on command. eyes move fully in both directions. There is no evidence for facial droop or loss of nasolabial folds on either side. Visual fields are intact.   Motor strength in the upper 5/5 b Sinus Rhythm  Reading: No acute changes.   Q waves inferiorly which are old when compared to prior EKG              Mri Brain Mra Head+mra Neck (all W+wo) (cpt=70553/94912/15913)    Result Date: 1/2/2019  PROCEDURE:  MRI BRAIN MRA HEAD+MRA NECK (ALL W+WO) ( COMMUNICATING:  No visible stenosis or aneurysm. MIDDLE CEREBRALS:         No visible stenosis or aneurysm. POSTERIOR COMMUNICATING: No visible stenosis or aneurysm. SUPERIOR CEREBELLARS:         No visible stenosis or aneurysm.  BASILAR:             No vis male comes in emergency room for evaluation of vertigo. History of prior cerebellar stroke. MRI showing chronic vertebral occlusion similar to prior MRIs. No new findings on MRI. Patient has no acute stroke here.   Case discussed with Dr. Vilma Ruiz from PM    START taking these medications    Meclizine HCl 25 MG Oral Tab  Take 1 tablet (25 mg total) by mouth 3 (three) times daily as needed for Dizziness., Normal, Disp-20 tablet, R-0    HYDROcodone-acetaminophen 5-325 MG Oral Tab  Take 1-2 tablets by mouth

## 2019-01-03 NOTE — TELEPHONE ENCOUNTER
Called the patient to reschedule his appointment (to a 40 minute) and ask him if he scheduled an appointment with neurology and the nephrologist as well as Dr. Nakia Quintero.  The patient asked that all of his medical matters be directed to either of his cousins, B

## 2019-01-03 NOTE — TELEPHONE ENCOUNTER
Noted below and rescheduled the patient's ER follow up for 01/28/19 at noon with Dr. Grace Redding. The patient's cousin, Basilio Antonio, has been notified and he stated he will fax the POA so that we can put it in the patient's chart.  DAVID routed to the [employer]

## 2019-01-03 NOTE — TELEPHONE ENCOUNTER
He scheduled an OV w/me for 20 minutes next week  Was in ER for worsening vertigo and headaches w/hx of cerebellar strokes. While there also found to have creatinine 2  Was referred to Neuro and Renal  Did he schedule either appt or just w/me?  He needs to

## 2019-01-04 LAB
ATRIAL RATE: 64 BPM
P AXIS: 62 DEGREES
P-R INTERVAL: 148 MS
Q-T INTERVAL: 388 MS
QRS DURATION: 98 MS
QTC CALCULATION (BEZET): 400 MS
R AXIS: 144 DEGREES
T AXIS: 89 DEGREES
VENTRICULAR RATE: 64 BPM

## 2019-01-07 ENCOUNTER — OFFICE VISIT (OUTPATIENT)
Dept: NEUROLOGY | Facility: CLINIC | Age: 64
End: 2019-01-07
Payer: COMMERCIAL

## 2019-01-07 VITALS
RESPIRATION RATE: 20 BRPM | DIASTOLIC BLOOD PRESSURE: 76 MMHG | BODY MASS INDEX: 38 KG/M2 | HEART RATE: 82 BPM | WEIGHT: 234 LBS | SYSTOLIC BLOOD PRESSURE: 132 MMHG

## 2019-01-07 DIAGNOSIS — I65.01 OCCLUSION OF RIGHT VERTEBRAL ARTERY: ICD-10-CM

## 2019-01-07 DIAGNOSIS — R42 DIZZINESS AND GIDDINESS: Primary | ICD-10-CM

## 2019-01-07 DIAGNOSIS — M54.2 NECK PAIN: ICD-10-CM

## 2019-01-07 DIAGNOSIS — G44.001 INTRACTABLE CLUSTER HEADACHE SYNDROME, UNSPECIFIED CHRONICITY PATTERN: ICD-10-CM

## 2019-01-07 DIAGNOSIS — G44.201 INTRACTABLE TENSION-TYPE HEADACHE, UNSPECIFIED CHRONICITY PATTERN: ICD-10-CM

## 2019-01-07 DIAGNOSIS — R41.3 MEMORY DEFICIT: ICD-10-CM

## 2019-01-07 PROBLEM — G44.209 TENSION TYPE HEADACHE: Status: ACTIVE | Noted: 2019-01-07

## 2019-01-07 PROCEDURE — 99215 OFFICE O/P EST HI 40 MIN: CPT | Performed by: OTHER

## 2019-01-07 RX ORDER — NAPROXEN SODIUM 550 MG/1
550 TABLET ORAL 2 TIMES DAILY WITH MEALS
Qty: 60 TABLET | Refills: 2 | Status: SHIPPED | OUTPATIENT
Start: 2019-01-07 | End: 2019-01-29

## 2019-01-07 RX ORDER — GABAPENTIN 100 MG/1
100 CAPSULE ORAL 3 TIMES DAILY
Qty: 90 CAPSULE | Refills: 0 | Status: SHIPPED | OUTPATIENT
Start: 2019-01-07 | End: 2019-01-29

## 2019-01-07 NOTE — PROGRESS NOTES
The patient is here for a follow-up for headaches. The patient was in the ER on 01/02/19 for vertigo. The patient states he is having constant headaches. The patient states when he turns his head he gets dizzy.  The patient had an MRA of the head and neck o

## 2019-01-07 NOTE — PROGRESS NOTES
Richard [address]   Neurology; follow up  CLINIC VISIT  2018    Darian Meneses Patient Status:  No patient class for patient encounter    1955 MRN [id number]   Location [address] [place], [place] [address]yleen Chalo unspecified hyperlipidemia    • RLS (restless legs syndrome) 12/3/2015       PAST SURGICAL HISTORY:  Past Surgical History:   Procedure Laterality Date   • ANGIOGRAM     • BIOPSY OF PROSTATE,NEEDLE/PUNCH  2009    benign   • COLOSTOMY      had reversal   • calm, normal appetite,   EYES: no visual complaints or deficits  HEENT: denies nasal congestion, sinus pain or sore throat; no  hearing loss;  RESPIRATORY: denies shortness of breath, wheezing or cough   CARDIOVASCULAR: denies chest pain, no palpitations V: Masseters strong, Facial sensations slightly decreased in L. Side face.         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      Dictated by: Colonel Yazan MD on 1/02/2019 at 20:00       Approved by: Colonel Yazan MD          Problem List Items Addres prognosis, treatment. The patient was given ample opportunity to ask questions. All questions and concerns were addressed.      Rossana Brice MD  General Neurology   Neuromuscular/ Electrodiagnostic Specialist  [facility]  1/7/2019

## 2019-01-14 ENCOUNTER — OFFICE VISIT (OUTPATIENT)
Dept: NEPHROLOGY | Facility: CLINIC | Age: 64
End: 2019-01-14
Payer: COMMERCIAL

## 2019-01-14 ENCOUNTER — APPOINTMENT (OUTPATIENT)
Dept: LAB | Age: 64
End: 2019-01-14
Attending: INTERNAL MEDICINE
Payer: COMMERCIAL

## 2019-01-14 VITALS — BODY MASS INDEX: 38 KG/M2 | WEIGHT: 236 LBS | DIASTOLIC BLOOD PRESSURE: 78 MMHG | SYSTOLIC BLOOD PRESSURE: 126 MMHG

## 2019-01-14 DIAGNOSIS — I10 ESSENTIAL HYPERTENSION: ICD-10-CM

## 2019-01-14 DIAGNOSIS — N18.30 CKD (CHRONIC KIDNEY DISEASE) STAGE 3, GFR 30-59 ML/MIN (HCC): ICD-10-CM

## 2019-01-14 DIAGNOSIS — N18.30 CKD (CHRONIC KIDNEY DISEASE) STAGE 3, GFR 30-59 ML/MIN (HCC): Primary | ICD-10-CM

## 2019-01-14 LAB
BILIRUB UR QL STRIP.AUTO: NEGATIVE
COLOR UR AUTO: YELLOW
CREAT UR-SCNC: 336 MG/DL
GLUCOSE UR STRIP.AUTO-MCNC: NEGATIVE MG/DL
KETONES UR STRIP.AUTO-MCNC: NEGATIVE MG/DL
LEUKOCYTE ESTERASE UR QL STRIP.AUTO: NEGATIVE
NITRITE UR QL STRIP.AUTO: NEGATIVE
PH UR STRIP.AUTO: 5 [PH] (ref 4.5–8)
PROT UR STRIP.AUTO-MCNC: NEGATIVE MG/DL
PROT UR-MCNC: 33.9 MG/DL
RBC UR QL AUTO: NEGATIVE
SP GR UR STRIP.AUTO: 1.03 (ref 1–1.03)
UROBILINOGEN UR STRIP.AUTO-MCNC: <2 MG/DL

## 2019-01-14 PROCEDURE — 81003 URINALYSIS AUTO W/O SCOPE: CPT | Performed by: INTERNAL MEDICINE

## 2019-01-14 PROCEDURE — 99243 OFF/OP CNSLTJ NEW/EST LOW 30: CPT | Performed by: INTERNAL MEDICINE

## 2019-01-14 PROCEDURE — 82570 ASSAY OF URINE CREATININE: CPT | Performed by: INTERNAL MEDICINE

## 2019-01-14 PROCEDURE — 84156 ASSAY OF PROTEIN URINE: CPT | Performed by: INTERNAL MEDICINE

## 2019-01-14 NOTE — PROGRESS NOTES
Consult Note      REASON FOR CONSULT: CKD III         HPI:   Cleveland Griffith is a 59year old male with Patient presents with:   Other: elevated creatinine    Soo Mohan MD    Mr. Callejas Marybeth was seen in the nephrology clinic today consultatio unspecified hyperlipidemia    • RLS (restless legs syndrome) 12/3/2015         PSH:  Past Surgical History:   Procedure Laterality Date   • ANGIOGRAM     • BIOPSY OF PROSTATE,NEEDLE/PUNCH  2009    benign   • COLOSTOMY      had reversal   • KNEE REPLACEMENT tobacco: Never Used      Tobacco comment: quit 1985    Substance and Sexual Activity      Alcohol use:  Yes        Alcohol/week: 3.6 oz        Types: 6 Standard drinks or equivalent per week        Comment: 6 drinks per week       Drug use: No    Other Topi MCH 29.7 01/02/2019    MCHC 33.6 01/02/2019    RDW 14.5 01/02/2019    NEPRELIM 7.84 (H) 01/02/2019    NEUTABS 5,624 02/16/2015    LYMPHABS 1,904 02/16/2015    EOSABS 192 02/16/2015    BASABS 32 02/16/2015    NEUT 70.3 02/16/2015    LYMPH 23.8 02/16/2015 disease and I have asked him to perform a urinalysis and urine protein to creatinine ratio. If this is suggestive then we will pursue a renal biopsy for diagnostic and prognostic purposes.   I did caution him against the use of nonsteroidal anti-inflammato

## 2019-01-25 ENCOUNTER — TELEPHONE (OUTPATIENT)
Dept: INTERNAL MEDICINE CLINIC | Facility: CLINIC | Age: 64
End: 2019-01-25

## 2019-01-25 ENCOUNTER — TELEPHONE (OUTPATIENT)
Dept: NEUROLOGY | Facility: CLINIC | Age: 64
End: 2019-01-25

## 2019-01-25 ENCOUNTER — APPOINTMENT (OUTPATIENT)
Dept: CT IMAGING | Facility: HOSPITAL | Age: 64
End: 2019-01-25
Attending: EMERGENCY MEDICINE
Payer: COMMERCIAL

## 2019-01-25 ENCOUNTER — HOSPITAL ENCOUNTER (EMERGENCY)
Facility: HOSPITAL | Age: 64
Discharge: HOME | End: 2019-01-25
Attending: EMERGENCY MEDICINE
Payer: COMMERCIAL

## 2019-01-25 VITALS
OXYGEN SATURATION: 97 % | DIASTOLIC BLOOD PRESSURE: 84 MMHG | HEIGHT: 66 IN | RESPIRATION RATE: 18 BRPM | SYSTOLIC BLOOD PRESSURE: 137 MMHG | TEMPERATURE: 98 F | WEIGHT: 235 LBS | HEART RATE: 71 BPM | BODY MASS INDEX: 37.77 KG/M2

## 2019-01-25 DIAGNOSIS — G43.709 CHRONIC MIGRAINE WITHOUT AURA WITHOUT STATUS MIGRAINOSUS, NOT INTRACTABLE: Primary | ICD-10-CM

## 2019-01-25 LAB
ALBUMIN SERPL-MCNC: 4.2 G/DL (ref 3.1–4.5)
ALBUMIN/GLOB SERPL: 1.1 {RATIO} (ref 1–2)
ALP LIVER SERPL-CCNC: 57 U/L (ref 45–117)
ALT SERPL-CCNC: 28 U/L (ref 17–63)
ANION GAP SERPL CALC-SCNC: 11 MMOL/L (ref 0–18)
AST SERPL-CCNC: 24 U/L (ref 15–41)
BASOPHILS # BLD AUTO: 0.06 X10(3) UL (ref 0–0.1)
BASOPHILS NFR BLD AUTO: 0.8 %
BILIRUB SERPL-MCNC: 0.3 MG/DL (ref 0.1–2)
BUN BLD-MCNC: 15 MG/DL (ref 8–20)
BUN/CREAT SERPL: 9.1 (ref 10–20)
CALCIUM BLD-MCNC: 9.1 MG/DL (ref 8.3–10.3)
CHLORIDE SERPL-SCNC: 108 MMOL/L (ref 101–111)
CO2 SERPL-SCNC: 21 MMOL/L (ref 22–32)
CREAT BLD-MCNC: 1.64 MG/DL (ref 0.7–1.3)
EOSINOPHIL # BLD AUTO: 0.22 X10(3) UL (ref 0–0.3)
EOSINOPHIL NFR BLD AUTO: 2.8 %
ERYTHROCYTE [DISTWIDTH] IN BLOOD BY AUTOMATED COUNT: 14.3 % (ref 11.5–16)
GLOBULIN PLAS-MCNC: 3.9 G/DL (ref 2.8–4.4)
GLUCOSE BLD-MCNC: 87 MG/DL (ref 70–99)
HCT VFR BLD AUTO: 50.5 % (ref 37–53)
HGB BLD-MCNC: 16.8 G/DL (ref 13–17)
IMMATURE GRANULOCYTE COUNT: 0.05 X10(3) UL (ref 0–1)
IMMATURE GRANULOCYTE RATIO %: 0.6 %
LYMPHOCYTES # BLD AUTO: 1.93 X10(3) UL (ref 0.9–4)
LYMPHOCYTES NFR BLD AUTO: 24.6 %
M PROTEIN MFR SERPL ELPH: 8.1 G/DL (ref 6.4–8.2)
MCH RBC QN AUTO: 29.9 PG (ref 27–33.2)
MCHC RBC AUTO-ENTMCNC: 33.3 G/DL (ref 31–37)
MCV RBC AUTO: 90 FL (ref 80–99)
MONOCYTES # BLD AUTO: 0.69 X10(3) UL (ref 0.1–1)
MONOCYTES NFR BLD AUTO: 8.8 %
NEUTROPHIL ABS PRELIM: 4.9 X10 (3) UL (ref 1.3–6.7)
NEUTROPHILS # BLD AUTO: 4.9 X10(3) UL (ref 1.3–6.7)
NEUTROPHILS NFR BLD AUTO: 62.4 %
OSMOLALITY SERPL CALC.SUM OF ELEC: 290 MOSM/KG (ref 275–295)
PLATELET # BLD AUTO: 298 10(3)UL (ref 150–450)
POTASSIUM SERPL-SCNC: 4.4 MMOL/L (ref 3.6–5.1)
RBC # BLD AUTO: 5.61 X10(6)UL (ref 4.3–5.7)
RED CELL DISTRIBUTION WIDTH-SD: 46.7 FL (ref 35.1–46.3)
SODIUM SERPL-SCNC: 140 MMOL/L (ref 136–144)
WBC # BLD AUTO: 7.9 X10(3) UL (ref 4–13)

## 2019-01-25 PROCEDURE — 96375 TX/PRO/DX INJ NEW DRUG ADDON: CPT

## 2019-01-25 PROCEDURE — 80053 COMPREHEN METABOLIC PANEL: CPT | Performed by: EMERGENCY MEDICINE

## 2019-01-25 PROCEDURE — 96365 THER/PROPH/DIAG IV INF INIT: CPT

## 2019-01-25 PROCEDURE — 70450 CT HEAD/BRAIN W/O DYE: CPT | Performed by: EMERGENCY MEDICINE

## 2019-01-25 PROCEDURE — 85025 COMPLETE CBC W/AUTO DIFF WBC: CPT | Performed by: EMERGENCY MEDICINE

## 2019-01-25 PROCEDURE — 99284 EMERGENCY DEPT VISIT MOD MDM: CPT

## 2019-01-25 PROCEDURE — 99285 EMERGENCY DEPT VISIT HI MDM: CPT

## 2019-01-25 RX ORDER — DEXAMETHASONE SODIUM PHOSPHATE 4 MG/ML
10 VIAL (ML) INJECTION ONCE
Status: COMPLETED | OUTPATIENT
Start: 2019-01-25 | End: 2019-01-25

## 2019-01-25 RX ORDER — METOCLOPRAMIDE HYDROCHLORIDE 5 MG/ML
10 INJECTION INTRAMUSCULAR; INTRAVENOUS ONCE
Status: COMPLETED | OUTPATIENT
Start: 2019-01-25 | End: 2019-01-25

## 2019-01-25 RX ORDER — DIPHENHYDRAMINE HYDROCHLORIDE 50 MG/ML
25 INJECTION INTRAMUSCULAR; INTRAVENOUS ONCE
Status: COMPLETED | OUTPATIENT
Start: 2019-01-25 | End: 2019-01-25

## 2019-01-25 RX ORDER — HYDROMORPHONE HYDROCHLORIDE 1 MG/ML
1 INJECTION, SOLUTION INTRAMUSCULAR; INTRAVENOUS; SUBCUTANEOUS ONCE
Status: COMPLETED | OUTPATIENT
Start: 2019-01-25 | End: 2019-01-25

## 2019-01-25 RX ORDER — KETOROLAC TROMETHAMINE 30 MG/ML
15 INJECTION, SOLUTION INTRAMUSCULAR; INTRAVENOUS ONCE
Status: COMPLETED | OUTPATIENT
Start: 2019-01-25 | End: 2019-01-25

## 2019-01-25 RX ORDER — HYDROMORPHONE HYDROCHLORIDE 1 MG/ML
0.5 INJECTION, SOLUTION INTRAMUSCULAR; INTRAVENOUS; SUBCUTANEOUS ONCE
Status: DISCONTINUED | OUTPATIENT
Start: 2019-01-25 | End: 2019-01-25

## 2019-01-25 NOTE — TELEPHONE ENCOUNTER
Brenna Gutierrez is a physical therapist providing therapy to patient at [facility].   Pt is currently there for treatment, and is reporting that he has been experiencing increasing persistent dizziness recently, and it is progressively worse dizzine

## 2019-01-25 NOTE — TELEPHONE ENCOUNTER
Incoming (mail or fax):  fax  Received from:   [facility]  Documentation given to:  triage

## 2019-01-25 NOTE — ED PROVIDER NOTES
Patient Seen in: [facility] Emergency Department    History   Patient presents with:  Headache (neurologic)  Dizziness (neurologic)    Stated Complaint: dizziness, ha woke up with this am    HPI    21-year-old male has a history of having a stroke a Alcohol/week: 3.6 oz      Types: 6 Standard drinks or equivalent per week      Comment: 6 drinks per week     Drug use: No      Review of Systems    Positive for stated complaint: dizziness, ha woke up with this am  Other systems are as noted in HPI.   Cons Abnormal            Final result                 Please view results for these tests on the individual orders.    RAINBOW DRAW BLUE   RAINBOW DRAW LAVENDER   RAINBOW DRAW LIGHT GREEN   RAINBOW DRAW GOLD          Medications   Metoclopramide HCl (REGL

## 2019-01-25 NOTE — TELEPHONE ENCOUNTER
I was called by patients cousin. He states that he called because Mr. Júnior Rueda came to ED form PT with headache and blurry vision. He states that this has happened in the past. He had no other focal weakness.  Per cousin this is no different than previous ep

## 2019-01-25 NOTE — ED INITIAL ASSESSMENT (HPI)
60 yo M with PMH of stroke aug, 2018 came today for complaints of headache, dizziness, and double vision. Pt have been having headache and dizziness since he had a stroke back in august, 2018. Headache and dizziness got worst today.  Pt also complaining of

## 2019-01-28 ENCOUNTER — TELEPHONE (OUTPATIENT)
Dept: NEUROLOGY | Facility: CLINIC | Age: 64
End: 2019-01-28

## 2019-01-28 NOTE — TELEPHONE ENCOUNTER
Called patient and informed him that Dr. Agustín Tolentino would like to see patient in office ASAP. Offered patient appointments 1/28/2019 and patient declined. Patient offered appointment on 1/29/2019 at [phone] and patient accepted.  Appointment placed on hold and

## 2019-01-29 ENCOUNTER — OFFICE VISIT (OUTPATIENT)
Dept: NEUROLOGY | Facility: CLINIC | Age: 64
End: 2019-01-29
Payer: COMMERCIAL

## 2019-01-29 VITALS
HEART RATE: 81 BPM | HEIGHT: 66 IN | DIASTOLIC BLOOD PRESSURE: 84 MMHG | WEIGHT: 236 LBS | RESPIRATION RATE: 20 BRPM | SYSTOLIC BLOOD PRESSURE: 132 MMHG | BODY MASS INDEX: 37.93 KG/M2

## 2019-01-29 DIAGNOSIS — G47.33 OSA (OBSTRUCTIVE SLEEP APNEA): ICD-10-CM

## 2019-01-29 DIAGNOSIS — M54.2 NECK PAIN: ICD-10-CM

## 2019-01-29 DIAGNOSIS — I65.01 OCCLUSION OF RIGHT VERTEBRAL ARTERY: ICD-10-CM

## 2019-01-29 DIAGNOSIS — R41.3 MEMORY DEFICIT: ICD-10-CM

## 2019-01-29 DIAGNOSIS — I63.9 ACUTE CVA (CEREBROVASCULAR ACCIDENT) (HCC): ICD-10-CM

## 2019-01-29 DIAGNOSIS — R42 DIZZINESS AND GIDDINESS: ICD-10-CM

## 2019-01-29 DIAGNOSIS — G44.001 INTRACTABLE CLUSTER HEADACHE SYNDROME, UNSPECIFIED CHRONICITY PATTERN: ICD-10-CM

## 2019-01-29 DIAGNOSIS — G44.201 INTRACTABLE TENSION-TYPE HEADACHE, UNSPECIFIED CHRONICITY PATTERN: Primary | ICD-10-CM

## 2019-01-29 PROCEDURE — 99215 OFFICE O/P EST HI 40 MIN: CPT | Performed by: OTHER

## 2019-01-29 NOTE — PROGRESS NOTES
Per verbal order from Dr. Pau Avilez patient to be referred to Dr. Madison Ramirez followed by referral to Dr. Magdalena Young. Referral placed and signed via vorb. Instructed patient to see Dr. Alistair Hodge first. Patient VU.

## 2019-01-29 NOTE — PROGRESS NOTES
Richard [facility]27   Neurology; follow up  CLINIC VISIT  2019    Isabell Putnam Patient Status:  No patient class for patient encounter    1955 MRN [id number]   Location [address] blurred vision, PT sent him to ER on 1/25/2019, He has constant HA and blurred vision last few weeks, even since he started pill I gave,  went to ER again on 1/25/2019, got injection 10/10, after injection was 8/10, he woke up with HA 8/10 daily basis.  He mouth one time daily Disp: 30 capsule Rfl: 0   lisinopril 10 MG Oral Tab Take 1 tablet (10 mg total) by mouth once daily. Disp: 30 tablet Rfl: 0   Metoprolol Succinate ER 50 MG Oral Tablet 24 Hr Take 1 tablet (50 mg total) by mouth once daily.  Disp: 30 tab lesions    Neurologic Examination:  Mental status: he is awake, alert, oriented to time, name and place, Mentally appropriate  for age;  Language and speech: language is intact in expression and comprehension, no dysarthria, voice is normal in volume, foll Visit     AMALIA (obstructive sleep apnea)    Acute CVA (cerebrovascular accident) ([facility] [address]    Occlusion of right vertebral artery    Relevant Orders    NEUROINTERVENTIONAL - INTERNAL REFERRAL    Cluster headaches    Relevant Orders    NEURO - EXTERNAL    Memory to get another opinion to [facility] doctor, refer him back to Dr. Brian Brooks, who saw him in past, he reported PT would not do his neck PT unless he is cleared with vascular doctor,         Return if symptoms worsen or fail to improve.       We discussed in depth reg

## 2019-01-30 RX ORDER — METOPROLOL SUCCINATE 50 MG/1
TABLET, EXTENDED RELEASE ORAL
Qty: 30 TABLET | Refills: 0 | Status: SHIPPED | OUTPATIENT
Start: 2019-01-30 | End: 2019-02-12

## 2019-01-30 RX ORDER — FENOFIBRIC ACID 135 MG/1
CAPSULE, DELAYED RELEASE ORAL
Qty: 30 CAPSULE | Refills: 0 | Status: SHIPPED | OUTPATIENT
Start: 2019-01-30 | End: 2019-02-12

## 2019-01-30 RX ORDER — ROPINIROLE 0.5 MG/1
TABLET, FILM COATED ORAL
Qty: 90 TABLET | Refills: 0 | Status: SHIPPED | OUTPATIENT
Start: 2019-01-30 | End: 2019-02-12

## 2019-01-30 RX ORDER — LISINOPRIL 10 MG/1
TABLET ORAL
Qty: 30 TABLET | Refills: 0 | Status: SHIPPED | OUTPATIENT
Start: 2019-01-30 | End: 2019-02-12

## 2019-01-30 NOTE — TELEPHONE ENCOUNTER
Last OV relevant to medication: 3/27/18  Last refill date: 11/1/18     #/refills: 90/0  When pt was asked to return for OV: none noted  Upcoming appt/reason: 2/12/19 ER f/u

## 2019-01-31 ENCOUNTER — MED REC SCAN ONLY (OUTPATIENT)
Dept: INTERNAL MEDICINE CLINIC | Facility: CLINIC | Age: 64
End: 2019-01-31

## 2019-02-12 ENCOUNTER — APPOINTMENT (OUTPATIENT)
Dept: LAB | Age: 64
End: 2019-02-12
Attending: INTERNAL MEDICINE
Payer: COMMERCIAL

## 2019-02-12 ENCOUNTER — OFFICE VISIT (OUTPATIENT)
Dept: INTERNAL MEDICINE CLINIC | Facility: CLINIC | Age: 64
End: 2019-02-12
Payer: COMMERCIAL

## 2019-02-12 ENCOUNTER — TELEPHONE (OUTPATIENT)
Dept: NEUROLOGY | Facility: CLINIC | Age: 64
End: 2019-02-12

## 2019-02-12 VITALS
WEIGHT: 241.38 LBS | BODY MASS INDEX: 38.79 KG/M2 | HEART RATE: 78 BPM | OXYGEN SATURATION: 99 % | RESPIRATION RATE: 16 BRPM | SYSTOLIC BLOOD PRESSURE: 122 MMHG | HEIGHT: 66 IN | DIASTOLIC BLOOD PRESSURE: 68 MMHG

## 2019-02-12 DIAGNOSIS — R51.9 CHRONIC DAILY HEADACHE: ICD-10-CM

## 2019-02-12 DIAGNOSIS — N40.0 BENIGN PROSTATIC HYPERPLASIA, UNSPECIFIED WHETHER LOWER URINARY TRACT SYMPTOMS PRESENT: ICD-10-CM

## 2019-02-12 DIAGNOSIS — N40.0 BENIGN PROSTATIC HYPERPLASIA, UNSPECIFIED WHETHER LOWER URINARY TRACT SYMPTOMS PRESENT: Primary | ICD-10-CM

## 2019-02-12 DIAGNOSIS — E78.2 MIXED HYPERLIPIDEMIA: ICD-10-CM

## 2019-02-12 DIAGNOSIS — N18.30 CKD (CHRONIC KIDNEY DISEASE) STAGE 3, GFR 30-59 ML/MIN (HCC): ICD-10-CM

## 2019-02-12 DIAGNOSIS — I10 ESSENTIAL HYPERTENSION: ICD-10-CM

## 2019-02-12 PROBLEM — Z86.73 HISTORY OF STROKE: Status: ACTIVE | Noted: 2019-02-12
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
## 2019-02-12 PROBLEM — I63.9 ACUTE CVA (CEREBROVASCULAR ACCIDENT) (HCC): Status: RESOLVED | Noted: 2017-08-21 | Resolved: 2019-02-12

## 2019-02-12 LAB
COMPLEXED PSA SERPL-MCNC: 3.32 NG/ML (ref ?–4)
TSI SER-ACNC: 3.54 MIU/ML (ref 0.36–3.74)

## 2019-02-12 PROCEDURE — 96127 BRIEF EMOTIONAL/BEHAV ASSMT: CPT | Performed by: INTERNAL MEDICINE

## 2019-02-12 PROCEDURE — 99214 OFFICE O/P EST MOD 30 MIN: CPT | Performed by: INTERNAL MEDICINE

## 2019-02-12 PROCEDURE — 84153 ASSAY OF PSA TOTAL: CPT | Performed by: INTERNAL MEDICINE

## 2019-02-12 PROCEDURE — 84443 ASSAY THYROID STIM HORMONE: CPT | Performed by: INTERNAL MEDICINE

## 2019-02-12 PROCEDURE — 36415 COLL VENOUS BLD VENIPUNCTURE: CPT | Performed by: INTERNAL MEDICINE

## 2019-02-12 RX ORDER — ACETAMINOPHEN 500 MG
1000 TABLET ORAL EVERY 6 HOURS PRN
COMMUNITY
End: 2020-07-08 | Stop reason: ALTCHOICE

## 2019-02-12 RX ORDER — METOPROLOL SUCCINATE 50 MG/1
50 TABLET, EXTENDED RELEASE ORAL
Qty: 90 TABLET | Refills: 1 | Status: SHIPPED | OUTPATIENT
Start: 2019-02-12 | End: 2019-07-23 | Stop reason: ALTCHOICE

## 2019-02-12 RX ORDER — LISINOPRIL 10 MG/1
10 TABLET ORAL
Qty: 90 TABLET | Refills: 1 | Status: SHIPPED | OUTPATIENT
Start: 2019-02-12 | End: 2019-06-05

## 2019-02-12 RX ORDER — ROPINIROLE 0.5 MG/1
TABLET, FILM COATED ORAL
Qty: 270 TABLET | Refills: 1 | Status: SHIPPED | OUTPATIENT
Start: 2019-02-12 | End: 2019-07-23 | Stop reason: ALTCHOICE

## 2019-02-12 RX ORDER — FENOFIBRIC ACID 135 MG/1
1 CAPSULE, DELAYED RELEASE ORAL
Qty: 90 CAPSULE | Refills: 1 | Status: SHIPPED | OUTPATIENT
Start: 2019-02-12 | End: 2019-06-05

## 2019-02-12 RX ORDER — ATORVASTATIN CALCIUM 80 MG/1
TABLET, FILM COATED ORAL
Qty: 90 TABLET | Refills: 1 | Status: SHIPPED | OUTPATIENT
Start: 2019-02-12 | End: 2019-10-07

## 2019-02-12 NOTE — PROGRESS NOTES
Laury Doshi is a 59year old male.  To F/U from last visit regarding hyperlipidemia, chronic intractable headaches, cerebrovascular disease, CKD 3, HTN and dizzinesss  HPI:    Interim history:since CVA worsening chronic daily intractable headaches Problem List:     CAD (coronary artery disease)     HTN (hypertension)     Dyslipidemia     BPH (benign prostatic hyperplasia)     IGT (impaired glucose tolerance)     RLS (restless legs syndrome)     Benign fasciculation-cramp syndrome     Drug-induced er Calculated Osmolality 290 275 - 295 mOsm/kg    GFR, Non- 44 (L) >=60    GFR, -American 51 (L) >=60    AST 24 15 - 41 U/L    ALT 28 17 - 63 U/L    Alkaline Phosphatase 57 45 - 117 U/L    Bilirubin, Total 0.3 0.1 - 2.0 mg/dL    Total P headache- gave him info on Dr Jerrica Jason and Dr Lavinia Joyner that Dr Corey Gunn recommended  Mixed hyperlipidemia- check FLP, refill meds  Essential hypertension- at goal, CPM    Orders Placed This Encounter      Lipid      TSH W Reflex To Free T4      PSA S

## 2019-02-12 NOTE — TELEPHONE ENCOUNTER
Clarified that Dr. Esquivel Brochure did intend for patient to see Dr. Luisito Castrejon. Transferred to  with clarification provided.

## 2019-02-18 ENCOUNTER — OFFICE VISIT (OUTPATIENT)
Dept: SURGERY | Facility: CLINIC | Age: 64
End: 2019-02-18
Payer: COMMERCIAL

## 2019-02-18 VITALS — SYSTOLIC BLOOD PRESSURE: 128 MMHG | HEART RATE: 76 BPM | DIASTOLIC BLOOD PRESSURE: 78 MMHG

## 2019-02-18 DIAGNOSIS — I65.01 OCCLUSION OF RIGHT VERTEBRAL ARTERY: Primary | ICD-10-CM

## 2019-02-18 PROCEDURE — 99214 OFFICE O/P EST MOD 30 MIN: CPT | Performed by: PHYSICIAN ASSISTANT

## 2019-02-18 NOTE — PROGRESS NOTES
Pt is here for follow up for  Occlusion of right vertebral artery imaging ordered CT of the brain MRI and MRA of the head and neck.      Review of Systems:    Hand Dominance: right  General: fatigue  Neuro: memory loss  Head: headache  Musculoskeletal: numb

## 2019-02-18 NOTE — PROGRESS NOTES
Christina Guzman 35 Patient Status:  No patient class for patient encounter    1955 MRN [id number]   Location [address] [place] [address], [address] [place] [address] Drive, [address] [place] [address] Attending No att. providers found   [facility] Day # 0 PCP B 1999    right shoulder   • SHOULDER ARTHROSCOPY  1999   • TOTAL KNEE REPLACEMENT        No family history on file.    Social History    Tobacco Use      Smoking status: Former Smoker        Years: 20.00        Types: Cigarettes      Smokeless tobacco: [employer], and time   Speech: Fluent, no dysarthria  Memory and comprehension: Intact   Cranial Nerves: VFF with reported chronic, blurry vision, PERRL 3mm brisk, EOMI, no nystagmus, facial sensation intact, face symmetric, tongue midline, shoulder shrug equal

## 2019-02-23 ENCOUNTER — TELEPHONE (OUTPATIENT)
Dept: INTERNAL MEDICINE CLINIC | Facility: CLINIC | Age: 64
End: 2019-02-23

## 2019-02-25 ENCOUNTER — OFFICE VISIT (OUTPATIENT)
Dept: NEUROLOGY | Facility: CLINIC | Age: 64
End: 2019-02-25
Payer: COMMERCIAL

## 2019-02-25 VITALS — RESPIRATION RATE: 16 BRPM | DIASTOLIC BLOOD PRESSURE: 70 MMHG | HEART RATE: 80 BPM | SYSTOLIC BLOOD PRESSURE: 118 MMHG

## 2019-02-25 DIAGNOSIS — G44.209 TENSION-TYPE HEADACHE, NOT INTRACTABLE, UNSPECIFIED CHRONICITY PATTERN: ICD-10-CM

## 2019-02-25 DIAGNOSIS — G25.81 RLS (RESTLESS LEGS SYNDROME): ICD-10-CM

## 2019-02-25 DIAGNOSIS — Z86.73 HISTORY OF STROKE: ICD-10-CM

## 2019-02-25 DIAGNOSIS — G47.33 OSA (OBSTRUCTIVE SLEEP APNEA): Primary | ICD-10-CM

## 2019-02-25 DIAGNOSIS — M54.2 NECK PAIN: ICD-10-CM

## 2019-02-25 PROCEDURE — 99215 OFFICE O/P EST HI 40 MIN: CPT | Performed by: OTHER

## 2019-02-25 RX ORDER — GABAPENTIN 300 MG/1
300 CAPSULE ORAL NIGHTLY
Qty: 90 CAPSULE | Refills: 1 | Status: SHIPPED | OUTPATIENT
Start: 2019-02-25 | End: 2019-03-06

## 2019-02-25 NOTE — PROGRESS NOTES
[facility] Neurology outpatient progress note  Date of service: 2/25/2019    Patient here for a follow-up visit for worsening headache and neck pain. He was seen by Dr. Greg Barclay before, here to establish care for headache management. He takes tylenol daily.  Not started pill I gave,  went to ER again on 1/25/2019, got injection 10/10, after injection was 8/10, he woke up with HA 8/10 daily basis. He was having AMALIA, was on CPAP, but it did not help, he stopped it on his own, he came in with daughter.      REVIEW OF Surgical History:   Procedure Laterality Date   • ANGIOGRAM     • BIOPSY OF PROSTATE,NEEDLE/PUNCH  2009    benign   • COLOSTOMY      had reversal   • KNEE REPLACEMENT SURGERY  1986    left knee replacement   • KNEE REPLACEMENT SURGERY  1986    left knee tremor of any kind;   Sensory; Intact to light touch, temperature, vibration and proprioception;  DTRs; Symmetric in both arms and legs, including biceps, triceps, knees, ankles,  Babinski sign is negative;   Coordination: Normal FTN and HTS;   Gait: Norm patient, 25 minutes was spent counseling patient regarding imaging finding, cervicogenic headache, overuse of tylenol, nerve block, botox injection, pain management for TONG in neck, untreated AMALIA and its association with neurological conditions such as str

## 2019-02-25 NOTE — TELEPHONE ENCOUNTER
PA initiated for choline fenofibrate    Misha Garcia (Key: [id number])    Your request has been successfully sent to [address] [place] [address] for review. You may close this dialog, return to your dashboard, and perform other tasks.     To check for an

## 2019-02-26 NOTE — TELEPHONE ENCOUNTER
PA for choline fenofibrate capsule DR 135mg, qty #30 per 30 days approved for 1 year. Faxed to pharmacy.

## 2019-02-28 VITALS
SYSTOLIC BLOOD PRESSURE: 110 MMHG | HEIGHT: 66 IN | HEART RATE: 62 BPM | DIASTOLIC BLOOD PRESSURE: 56 MMHG | WEIGHT: 225 LBS | BODY MASS INDEX: 36.16 KG/M2

## 2019-03-04 ENCOUNTER — APPOINTMENT (OUTPATIENT)
Dept: LAB | Facility: HOSPITAL | Age: 64
End: 2019-03-04
Attending: INTERNAL MEDICINE
Payer: COMMERCIAL

## 2019-03-04 ENCOUNTER — HOSPITAL ENCOUNTER (OUTPATIENT)
Dept: ULTRASOUND IMAGING | Facility: HOSPITAL | Age: 64
Discharge: HOME | End: 2019-03-04
Attending: INTERNAL MEDICINE
Payer: COMMERCIAL

## 2019-03-04 DIAGNOSIS — N18.30 CKD (CHRONIC KIDNEY DISEASE) STAGE 3, GFR 30-59 ML/MIN (HCC): ICD-10-CM

## 2019-03-04 DIAGNOSIS — I10 ESSENTIAL HYPERTENSION: ICD-10-CM

## 2019-03-04 DIAGNOSIS — E78.2 MIXED HYPERLIPIDEMIA: ICD-10-CM

## 2019-03-04 LAB
CHOLEST SMN-MCNC: 117 MG/DL (ref ?–200)
HDLC SERPL-MCNC: 22 MG/DL (ref 40–59)
LDLC SERPL CALC-MCNC: 52 MG/DL (ref ?–100)
NONHDLC SERPL-MCNC: 95 MG/DL (ref ?–130)
TRIGL SERPL-MCNC: 216 MG/DL (ref 30–149)
VLDLC SERPL CALC-MCNC: 43 MG/DL (ref 0–30)

## 2019-03-04 PROCEDURE — 76770 US EXAM ABDO BACK WALL COMP: CPT | Performed by: INTERNAL MEDICINE

## 2019-03-04 PROCEDURE — 36415 COLL VENOUS BLD VENIPUNCTURE: CPT

## 2019-03-04 PROCEDURE — 80061 LIPID PANEL: CPT

## 2019-03-06 ENCOUNTER — TELEPHONE (OUTPATIENT)
Dept: NEUROLOGY | Facility: CLINIC | Age: 64
End: 2019-03-06

## 2019-03-06 RX ORDER — GABAPENTIN 300 MG/1
300 CAPSULE ORAL 4 TIMES DAILY
Qty: 90 CAPSULE | COMMUNITY
Start: 2019-03-06 | End: 2019-04-03

## 2019-03-06 NOTE — TELEPHONE ENCOUNTER
Relayed below to Juan (ok per HIPAA). Verbalized understanding. They will call back with condition update with medication change.      Updated new instructions in Epic as historical.

## 2019-03-06 NOTE — TELEPHONE ENCOUNTER
S-family member, Yong Espinoza (ok per HIPAA) calling with clarification of medication. B-seen by Dr. Janice Lovell for history of stroke, worsening headache and neck pain. Started on Neurontin at [facility].     A-Jasmyne reports that pt has worked up to 300mg TID as they [employer]

## 2019-03-06 NOTE — TELEPHONE ENCOUNTER
EC (on HIPPA and pt lives with her), said Dr Duncan Reynolds prescribed Gabapentin, 300 mgs 1 x daily.  Pt was also prescribed Gabapentin 100 mgs 1 x daily by another . Cristy Hodge has continued to take the Gabapentin prescribed by the other dr in addition to what Dr Duncan Reynolds

## 2019-03-14 NOTE — TELEPHONE ENCOUNTER
Need to have sleep study and see specialist, headache may be also associated with untreated sleep apnea. Cont same dose medication. If severe, can come in for nerve block. Pt [facility] requesting a refill of pt Valacyclovir  Pt was last seen on 11/17/2016~[facility]  Authorized Rx to Pt preferred pharmacy this date D#30 with 4 refills.   Routed as FYI this date to Dr Ordoñez.

## 2019-03-14 NOTE — TELEPHONE ENCOUNTER
S-spoke with cousin, Amanda Fowler (ok per HIPAA), she was wanting to provide condition update    B-increased dose of Gabapentin per below started earlier this month to help with headaches. A-increase in medication is still not helping overall with headache.  Amador Sanders

## 2019-03-14 NOTE — TELEPHONE ENCOUNTER
Would like to have nurse give call regarding updating patient condition on new or increased medication

## 2019-03-15 NOTE — TELEPHONE ENCOUNTER
Patient informed of below. Patient states he still is having slight daily headaches but no longer the throbbing. States he will think about doing the sleep study.

## 2019-03-16 ENCOUNTER — HOSPITAL ENCOUNTER (OUTPATIENT)
Age: 64
Discharge: HOME | End: 2019-03-16
Attending: FAMILY MEDICINE
Payer: COMMERCIAL

## 2019-03-16 VITALS
WEIGHT: 230 LBS | HEIGHT: 66 IN | HEART RATE: 84 BPM | DIASTOLIC BLOOD PRESSURE: 85 MMHG | TEMPERATURE: 97 F | BODY MASS INDEX: 36.96 KG/M2 | SYSTOLIC BLOOD PRESSURE: 124 MMHG | RESPIRATION RATE: 16 BRPM | OXYGEN SATURATION: 95 %

## 2019-03-16 DIAGNOSIS — R39.16 BENIGN PROSTATIC HYPERPLASIA (BPH) WITH STRAINING ON URINATION: Primary | ICD-10-CM

## 2019-03-16 DIAGNOSIS — N40.1 BENIGN PROSTATIC HYPERPLASIA (BPH) WITH STRAINING ON URINATION: Primary | ICD-10-CM

## 2019-03-16 LAB
POCT GLUCOSE URINE: NEGATIVE MG/DL
POCT KETONE URINE: NEGATIVE MG/DL
POCT LEUKOCYTE ESTERASE URINE: NEGATIVE
POCT NITRITE URINE: NEGATIVE
POCT PH URINE: 5 (ref 5–8)
POCT SPECIFIC GRAVITY URINE: 1.03
POCT URINE CLARITY: CLEAR
POCT URINE COLOR: YELLOW
POCT UROBILINOGEN URINE: 0.2 MG/DL

## 2019-03-16 PROCEDURE — 81002 URINALYSIS NONAUTO W/O SCOPE: CPT | Performed by: FAMILY MEDICINE

## 2019-03-16 PROCEDURE — 99214 OFFICE O/P EST MOD 30 MIN: CPT

## 2019-03-16 PROCEDURE — 99204 OFFICE O/P NEW MOD 45 MIN: CPT

## 2019-03-16 PROCEDURE — 87086 URINE CULTURE/COLONY COUNT: CPT | Performed by: FAMILY MEDICINE

## 2019-03-16 PROCEDURE — 81002 URINALYSIS NONAUTO W/O SCOPE: CPT

## 2019-03-16 RX ORDER — PHENAZOPYRIDINE HYDROCHLORIDE 200 MG/1
200 TABLET, FILM COATED ORAL 3 TIMES DAILY PRN
Qty: 6 TABLET | Refills: 0 | Status: SHIPPED | OUTPATIENT
Start: 2019-03-16 | End: 2019-03-19

## 2019-03-16 RX ORDER — TAMSULOSIN HYDROCHLORIDE 0.4 MG/1
0.4 CAPSULE ORAL DAILY
Qty: 30 CAPSULE | Refills: 0 | Status: SHIPPED | OUTPATIENT
Start: 2019-03-16 | End: 2019-04-08

## 2019-03-16 NOTE — ED INITIAL ASSESSMENT (HPI)
Pt c/o \"trouble urinating\" since last night. Pt states he was just dripping while urinating and at the end of urinating would have burning at the tip of penis. Denies hematuria, no fever, chills, back or abdominal pain.  Pt was able to give a urine specim

## 2019-03-16 NOTE — ED PROVIDER NOTES
Patient Seen in: [facility]    History   Patient presents with:  Urinary Symptoms (urologic)    Stated Complaint: urinary symptoms x 2 days    HPI    61-year-old male with a history of hypertension, CAD, status post stroke, No      Review of Systems    Positive for stated complaint: urinary symptoms x 2 days  Other systems are as noted in HPI. Constitutional and vital signs reviewed. All other systems reviewed and negative except as noted above.     Physical Exam     ED ago.  Exam shows enlarged prostate. Patient has follow-up with urology pending. We will send his urine out for culture. In the interim, will start patient on Pyridium for the pain/discomfort and Flomax for his BPH.             Disposition and Plan     Cl

## 2019-04-03 ENCOUNTER — OFFICE VISIT (OUTPATIENT)
Dept: NEUROLOGY | Facility: CLINIC | Age: 64
End: 2019-04-03
Payer: COMMERCIAL

## 2019-04-03 VITALS
DIASTOLIC BLOOD PRESSURE: 72 MMHG | BODY MASS INDEX: 39 KG/M2 | HEART RATE: 74 BPM | SYSTOLIC BLOOD PRESSURE: 128 MMHG | WEIGHT: 244 LBS | RESPIRATION RATE: 18 BRPM

## 2019-04-03 DIAGNOSIS — G47.33 OSA (OBSTRUCTIVE SLEEP APNEA): ICD-10-CM

## 2019-04-03 DIAGNOSIS — Z86.73 HISTORY OF STROKE: ICD-10-CM

## 2019-04-03 DIAGNOSIS — G44.86 CERVICOGENIC HEADACHE: Primary | ICD-10-CM

## 2019-04-03 DIAGNOSIS — M54.81 OCCIPITAL NEURALGIA, UNSPECIFIED LATERALITY: ICD-10-CM

## 2019-04-03 PROCEDURE — 99214 OFFICE O/P EST MOD 30 MIN: CPT | Performed by: OTHER

## 2019-04-03 RX ORDER — GABAPENTIN 300 MG/1
300 CAPSULE ORAL 4 TIMES DAILY
Qty: 120 CAPSULE | Refills: 2 | Status: SHIPPED | OUTPATIENT
Start: 2019-04-03 | End: 2019-08-04

## 2019-04-03 NOTE — PROGRESS NOTES
[facility] Neurology outpatient progress note  Date of service: 4/3/2019    Patient here for a follow-up visit for headache and neck pain. headache is better now, still has neck pain but does not want PT. Not using CPAP in the past 3 years.   Had stroke and poste 1/25/2019, got injection 10/10, after injection was 8/10, he woke up with HA 8/10 daily basis. He was having AMALIA, was on CPAP, but it did not help, he stopped it on his own.      REVIEW OF SYSTEMS:  A 10-point system was reviewed.  Pertinent positives and n legs syndrome) 12/3/2015   • Stroke [facility])     8/2017     Past Surgical History:   Procedure Laterality Date   • ANGIOGRAM     • BIOPSY OF PROSTATE,NEEDLE/PUNCH  2009    benign   • COLOSTOMY      had reversal   • KNEE REPLACEMENT SURGERY  1986    left knee indicates understanding of these issues and agrees with the plan. Discussed with patient in detail regarding the adverse and side effects of the medications.   RTC 3-4 months, nerve block prn  Pt is advised to go ER for any new or worsening symptoms and co

## 2019-04-08 ENCOUNTER — OFFICE VISIT (OUTPATIENT)
Dept: SURGERY | Facility: CLINIC | Age: 64
End: 2019-04-08
Payer: COMMERCIAL

## 2019-04-08 VITALS — TEMPERATURE: 99 F | SYSTOLIC BLOOD PRESSURE: 115 MMHG | DIASTOLIC BLOOD PRESSURE: 74 MMHG | HEART RATE: 68 BPM

## 2019-04-08 DIAGNOSIS — N40.1 BENIGN PROSTATIC HYPERPLASIA WITH LOWER URINARY TRACT SYMPTOMS, SYMPTOM DETAILS UNSPECIFIED: Primary | ICD-10-CM

## 2019-04-08 PROCEDURE — 99244 OFF/OP CNSLTJ NEW/EST MOD 40: CPT | Performed by: UROLOGY

## 2019-04-08 PROCEDURE — 99212 OFFICE O/P EST SF 10 MIN: CPT | Performed by: UROLOGY

## 2019-04-08 RX ORDER — TAMSULOSIN HYDROCHLORIDE 0.4 MG/1
0.4 CAPSULE ORAL DAILY
Qty: 90 CAPSULE | Refills: 0 | Status: SHIPPED | OUTPATIENT
Start: 2019-04-08 | End: 2019-08-30

## 2019-04-08 NOTE — PROGRESS NOTES
SUBJECTIVE:  Clint Toledo is a 59year old male who presents for a consultation at the request of, and a copy of this note will be sent to, Dr. Lissy Gregory, for evaluation of  benign prostatic hyperplasia.  He states that the problem is much impr REPLACEMENT SURGERY  1986    left knee   • OTHER SURGICAL HISTORY  1999    right shoulder   • SHOULDER ARTHROSCOPY  1999   • TOTAL KNEE REPLACEMENT        History reviewed. No pertinent family history.    Social History: Social History    Tobacco Use      S tenderness or nodules, normal in size      Rectal: normal tone, no masses  Skin exam grossly normal.    ASSESSMENT/PLAN  Benign prostatic hyperplasia with lower urinary tract symptoms, symptom details unspecified  (primary encounter diagnosis)    No orders

## 2019-04-09 ENCOUNTER — OFFICE VISIT (OUTPATIENT)
Dept: NEPHROLOGY | Facility: CLINIC | Age: 64
End: 2019-04-09
Payer: COMMERCIAL

## 2019-04-09 VITALS — DIASTOLIC BLOOD PRESSURE: 74 MMHG | SYSTOLIC BLOOD PRESSURE: 140 MMHG | BODY MASS INDEX: 40 KG/M2 | WEIGHT: 245 LBS

## 2019-04-09 DIAGNOSIS — N18.30 CKD (CHRONIC KIDNEY DISEASE) STAGE 3, GFR 30-59 ML/MIN (HCC): Primary | ICD-10-CM

## 2019-04-09 DIAGNOSIS — I10 ESSENTIAL HYPERTENSION: ICD-10-CM

## 2019-04-09 PROCEDURE — 99214 OFFICE O/P EST MOD 30 MIN: CPT | Performed by: INTERNAL MEDICINE

## 2019-05-09 RX ORDER — ROPINIROLE 0.5 MG/1
TABLET, FILM COATED ORAL
Qty: 270 TABLET | Refills: 0 | OUTPATIENT
Start: 2019-05-09

## 2019-05-09 RX ORDER — ATORVASTATIN CALCIUM 80 MG/1
TABLET, FILM COATED ORAL
Qty: 90 TABLET | Refills: 0 | OUTPATIENT
Start: 2019-05-09

## 2019-05-09 RX ORDER — LISINOPRIL 10 MG/1
TABLET ORAL
Qty: 90 TABLET | Refills: 0 | OUTPATIENT
Start: 2019-05-09

## 2019-05-16 ENCOUNTER — TELEPHONE (OUTPATIENT)
Dept: SURGERY | Facility: CLINIC | Age: 64
End: 2019-05-16

## 2019-05-16 DIAGNOSIS — G44.86 CERVICOGENIC HEADACHE: Primary | ICD-10-CM

## 2019-05-16 RX ORDER — METHYLPREDNISOLONE 4 MG/1
TABLET ORAL
Qty: 1 PACKAGE | Refills: 0 | Status: SHIPPED | OUTPATIENT
Start: 2019-05-16 | End: 2019-05-30 | Stop reason: ALTCHOICE

## 2019-05-16 NOTE — TELEPHONE ENCOUNTER
S-Escobar, cousin (ok per HIPAA) calling with condition update on headaches. B-seen for history of stroke and headaches. LOV 4/3/19 with Dr. Chris Cantrell. A-continues to have headaches per cousin. Is currently taking Gabapentin 300 QID.  States that pt rec

## 2019-05-16 NOTE — TELEPHONE ENCOUNTER
Per Epic review, Dr. Bonny Price approved trial of MDP. Informed Escobar, ok per HIPAA. Verbalized understanding. Will keep them posted on NBI once we hear back from insurance. Verbalized understanding.

## 2019-05-20 NOTE — TELEPHONE ENCOUNTER
Completed Shania Prior Authorization Form for YWO(43661,17868) and [id number]) /Attached with Clinical Information faxed to [address] [facility] Received.

## 2019-05-21 ENCOUNTER — TELEPHONE (OUTPATIENT)
Dept: NEUROLOGY | Facility: CLINIC | Age: 64
End: 2019-05-21

## 2019-05-21 NOTE — TELEPHONE ENCOUNTER
Spoke with pt, relayed information per Dr. Marsha Liao note. Informed pt that PA had already been started and that he would receive a call to schedule once PA is finished.  Pt expressed understanding, and was encouraged to contact office with any further con

## 2019-05-21 NOTE — TELEPHONE ENCOUNTER
Yes, I would consider injection; for occipital nerve block or trigger points, I can do them for pt, but if pt wants to do deep injection in neck /c spine, will have to be done by Pain management. Please let us know.

## 2019-05-23 NOTE — TELEPHONE ENCOUNTER
Pt's cousin (on HIPAA) said he did not receive a call back from his call on 5/21/19. Said pt's headaches are back and cousin would like to speak directly to RN to go over details. Also, cousin said pt is out of Prednisone and needs refill.

## 2019-05-23 NOTE — TELEPHONE ENCOUNTER
Patient's cousin notified of Dr Christopher Palacios recommendations. Cousin states patient returned CPAP machine because he didn't like it. Advised ER visit if headache severe.

## 2019-05-23 NOTE — TELEPHONE ENCOUNTER
Ok to come in for nerve block or TPI once approved. In the mean time, pt should consider go ER for severe headache. I do not recommend prolonged use of steroids. Has she been followed up with sleep re: AMALIA? Untreated AMALIA may worsen headache too.    Please

## 2019-05-23 NOTE — TELEPHONE ENCOUNTER
S: Incoming call from patient's cousin wanting to give update. B: Patient is being seen for Cervicogenic Headache. Was prescribed Medrol Dose Ricky on 5/16.  LOV 4/3/19.  neurontin 300 mg QID  Pulmonary/Sleep medicine to see  Cont  mg, lipitor 80 m

## 2019-05-28 NOTE — TELEPHONE ENCOUNTER
Papito Jeong from [facility] called to approve the TPI (20624,[id number]) 1 each and the NBI ([id number] 1 each)     Approval #[id number] from 5/20/19-8/17/19    She will also send a fax to us.

## 2019-05-29 NOTE — TELEPHONE ENCOUNTER
Spoke with patient's Arline Huerta who states patient's headaches are so very bad and would like dawit't ASAP for injection. Dawit't scheduled 5/30 @ 11:00.

## 2019-05-30 ENCOUNTER — OFFICE VISIT (OUTPATIENT)
Dept: NEUROLOGY | Facility: CLINIC | Age: 64
End: 2019-05-30
Payer: COMMERCIAL

## 2019-05-30 VITALS — RESPIRATION RATE: 16 BRPM | HEART RATE: 60 BPM | SYSTOLIC BLOOD PRESSURE: 112 MMHG | DIASTOLIC BLOOD PRESSURE: 68 MMHG

## 2019-05-30 DIAGNOSIS — M54.81 BILATERAL OCCIPITAL NEURALGIA: Primary | ICD-10-CM

## 2019-05-30 DIAGNOSIS — G44.86 CERVICOGENIC HEADACHE: ICD-10-CM

## 2019-05-30 DIAGNOSIS — M48.02 DEGENERATIVE CERVICAL SPINAL STENOSIS: ICD-10-CM

## 2019-05-30 PROCEDURE — 64400 NJX AA&/STRD TRIGEMINAL NRV: CPT | Performed by: OTHER

## 2019-05-30 PROCEDURE — 20553 NJX 1/MLT TRIGGER POINTS 3/>: CPT | Performed by: OTHER

## 2019-05-30 PROCEDURE — 99215 OFFICE O/P EST HI 40 MIN: CPT | Performed by: OTHER

## 2019-05-30 RX ORDER — BUPIVACAINE HYDROCHLORIDE 5 MG/ML
8 INJECTION, SOLUTION PERINEURAL ONCE
Status: COMPLETED | OUTPATIENT
Start: 2019-05-30 | End: 2019-05-30

## 2019-05-30 RX ORDER — TRIAMCINOLONE ACETONIDE 40 MG/ML
40 INJECTION, SUSPENSION INTRA-ARTICULAR; INTRAMUSCULAR ONCE
Status: COMPLETED | OUTPATIENT
Start: 2019-05-30 | End: 2019-05-30

## 2019-05-30 NOTE — PATIENT INSTRUCTIONS
Refill policies:    • Allow 2-3 business days for refills; controlled substances may take longer.   • Contact your pharmacy at least 5 days prior to running out of medication and have them send an electronic request or submit request through the “request re been approved by your insurer. Depending on your insurance carrier, approval may take 3-10 days. It is highly recommended patients contact their insurance carrier directly to determine coverage.   If test is done without insurance authorization, patient ma procedure(s) performed in clinic today signed prior to proceeding with procedure(s). Furthermore, patient notified that they should contact their insurer to verify that your procedure/test has been approved and is a COVERED benefit.   Although the [facility]

## 2019-05-30 NOTE — PROCEDURES
Date of service: 5/30/2019  1.  Procedure: Nerve block: occipital nerve, supraorbital nerve, zygomaticotemporal nerve, auriculotemporal nerve    Indication:   -intractable trigeminal/ occipital neuralgia  -intractable migraine headache    Procedure descript necessary include a 10 ml syringe, 0.5% Marcaine, Kenalog, 40 mg/ml, and a 25 gauge, 1.5 inch disposable needle.   After preparing the injection sites for the injection and the solution has been properly mixed, trigger point injections were performed in kati

## 2019-05-30 NOTE — PROGRESS NOTES
[facility] Neurology outpatient progress note  Date of service: 5/30/2019    Patient here for a follow-up visit for worsening headache and neck pain. headache is not improving, still has neck pain but does not want PT. Not using CPAP in the past 3 years.   Had st again on 1/25/2019, got injection 10/10, after injection was 8/10, he woke up with HA 8/10 daily basis. He was having AMALIA, was on CPAP, but it did not help, he stopped it on his own.     REVIEW OF SYSTEMS:  A 10-point system was reviewed.  Pertinent positiv • ANGIOGRAM     • BIOPSY OF PROSTATE,NEEDLE/PUNCH  2009    benign   • COLOSTOMY      had reversal   • KNEE REPLACEMENT SURGERY  1986    left knee replacement   • KNEE REPLACEMENT SURGERY  1986    left knee   • OTHER SURGICAL HISTORY  1999    right should Occlusion of right vertebral artery: chronic     Plan:   MRI c spine needed and see neurosurgery after MRI  Nerve block and trigger points injection done today in office after benefit/risk explained and consent obtained  neurontin 300 mg QID  Pulmonary/Sle

## 2019-06-05 DIAGNOSIS — I10 ESSENTIAL HYPERTENSION: ICD-10-CM

## 2019-06-05 DIAGNOSIS — E78.2 MIXED HYPERLIPIDEMIA: Primary | ICD-10-CM

## 2019-06-05 RX ORDER — FENOFIBRIC ACID 135 MG/1
CAPSULE, DELAYED RELEASE ORAL
Qty: 90 CAPSULE | Refills: 0 | Status: SHIPPED | OUTPATIENT
Start: 2019-06-05 | End: 2019-10-07

## 2019-06-05 RX ORDER — METOPROLOL SUCCINATE 50 MG/1
TABLET, EXTENDED RELEASE ORAL
Qty: 90 TABLET | Refills: 0 | Status: SHIPPED | OUTPATIENT
Start: 2019-06-05 | End: 2019-10-07

## 2019-06-05 RX ORDER — LISINOPRIL 10 MG/1
TABLET ORAL
Qty: 90 TABLET | Refills: 0 | Status: SHIPPED | OUTPATIENT
Start: 2019-06-05 | End: 2019-10-07

## 2019-06-05 NOTE — TELEPHONE ENCOUNTER
Last OV relevant to medication: 2/12/19  Last refill date: 2/12/19     #/refills: 270/1  When pt was asked to return for OV: 6 months  Upcoming appt/reason: none

## 2019-06-06 RX ORDER — ROPINIROLE 0.5 MG/1
TABLET, FILM COATED ORAL
Qty: 270 TABLET | Refills: 0 | Status: SHIPPED | OUTPATIENT
Start: 2019-06-06 | End: 2019-11-24

## 2019-06-25 ENCOUNTER — TELEPHONE (OUTPATIENT)
Dept: NEUROLOGY | Facility: CLINIC | Age: 64
End: 2019-06-25

## 2019-06-25 DIAGNOSIS — G44.86 CERVICOGENIC HEADACHE: ICD-10-CM

## 2019-06-25 DIAGNOSIS — M54.81 BILATERAL OCCIPITAL NEURALGIA: Primary | ICD-10-CM

## 2019-06-25 DIAGNOSIS — M48.02 DEGENERATIVE CERVICAL SPINAL STENOSIS: ICD-10-CM

## 2019-06-25 NOTE — TELEPHONE ENCOUNTER
Pt has upcoming appt for TPI/NBI. Spoke with referral team, would need new referral placed.      Placed referral.

## 2019-06-26 NOTE — TELEPHONE ENCOUNTER
Completed Shania Prior Authorization Form/ Attached with clinical information faxed to [address] Received.

## 2019-07-01 ENCOUNTER — TELEPHONE (OUTPATIENT)
Dept: NEUROLOGY | Facility: CLINIC | Age: 64
End: 2019-07-01

## 2019-07-01 NOTE — TELEPHONE ENCOUNTER
Per LOV 5/30/19 with Dr. Henry Hahn:  -declined PT at that visit, but per note, was in PT in 1/2019  -prescribed Gabapentin, Naproxen

## 2019-07-01 NOTE — TELEPHONE ENCOUNTER
Spoke with Ariane Sevilla in [place] [employer] regarding pending PA for NPI and TPI that is scheduled for tomorrow. Ariane Sevilla [facility] will follow-up.

## 2019-07-01 NOTE — TELEPHONE ENCOUNTER
Called Shania and spoke with Luci Fernandez she said that they were added to the previous authorization from May thru 8/17/19    Prior authorization request completed for: Nerve blocks and TPI in office  Authorization # [id number] - 1 additional units added for

## 2019-07-01 NOTE — TELEPHONE ENCOUNTER
A(n) Cervical Spine MRI must meet TAMARA Guidelines. TAMARA guidelines state that Cervical Spine MRI  must be needed due to a medical problem. Based on information given (neck pain), the study  requested does not meet these guidelines.  Thus, it can’t be approved 30  calendar days to decide your appeal could seriously risk your life or health, including your being able to

## 2019-07-02 NOTE — TELEPHONE ENCOUNTER
Please submit appeal letter. Pt has failed therapy, medication and all conservative measures to my knowledge. I suspect severe cervical degenerative disease perhaps with central canal and foraminal stenosis which are responsive for some of his symptoms.

## 2019-07-03 NOTE — TELEPHONE ENCOUNTER
Faxed appeal letter. Confirmation received. Informed cousin, Kamila Hodges (ok per HIPAA). Informed him of turn around time with appeal decisions.

## 2019-07-08 ENCOUNTER — OFFICE VISIT (OUTPATIENT)
Dept: SURGERY | Facility: CLINIC | Age: 64
End: 2019-07-08
Payer: COMMERCIAL

## 2019-07-08 DIAGNOSIS — N40.1 BENIGN PROSTATIC HYPERPLASIA WITH LOWER URINARY TRACT SYMPTOMS, SYMPTOM DETAILS UNSPECIFIED: Primary | ICD-10-CM

## 2019-07-08 PROCEDURE — 51741 ELECTRO-UROFLOWMETRY FIRST: CPT | Performed by: UROLOGY

## 2019-07-08 PROCEDURE — 99213 OFFICE O/P EST LOW 20 MIN: CPT | Performed by: UROLOGY

## 2019-07-08 NOTE — PROGRESS NOTES
Lexi Laura is a 59year old male. HPI:   Patient presents with:  Urinary Symptoms (urologic): patient presents for uroflow/bladder u/s      26-year-old male presents for uroflow bladder ultrasound and follow-up to visit April 8, 2019.   The pa use: No       Medications (Active prior to today's visit):    Current Outpatient Medications:  rOPINIRole HCl 0.5 MG Oral Tab TAKE ONE TABLET BY MOUTH EVERY DAY IN THE MORNING AND 2 TABLETS BY MOUTH AT NIGHT Disp: 270 tablet Rfl: 0   FENOFIBRIC ACID 135 MG were placed in this encounter.       Meds This Visit:  Requested Prescriptions      No prescriptions requested or ordered in this encounter       Imaging & Referrals:  None     7/8/2019  Kedar Pretty MD

## 2019-07-12 NOTE — TELEPHONE ENCOUNTER
LMTCB on voicemail (ok per HIPAA consent) to see if patient would  kam to fill out authorization form from Ins[employer] in [employer] office, have form mailed or faxed to him?

## 2019-07-12 NOTE — TELEPHONE ENCOUNTER
Received fax from [facility] requesting patient to fill out and sign Authorized representative Form allowing BÁRBARA to appeal on his behalf.

## 2019-07-16 NOTE — TELEPHONE ENCOUNTER
Patient states will come in this week to fill out authorization form from [employer] (in paperwork to complete bin).

## 2019-07-16 NOTE — TELEPHONE ENCOUNTER
Patient's cousin states patient has been having daily moderate/severe headaches. He has an cat't scheduled on 8/7 for TPI but was wondering if anything can be done sooner?

## 2019-07-17 NOTE — TELEPHONE ENCOUNTER
Appeal authorization consent for BÁRBARA to act on patient's behalf sent to patient's home address.  To be completed by patient and returned so it can be faxed to [employer]

## 2019-07-19 NOTE — TELEPHONE ENCOUNTER
As noted below, pt is coming in early next week for appt. Copy faxed to [facility] so he can sign there.

## 2019-07-23 ENCOUNTER — OFFICE VISIT (OUTPATIENT)
Dept: NEUROLOGY | Facility: CLINIC | Age: 64
End: 2019-07-23
Payer: COMMERCIAL

## 2019-07-23 VITALS — SYSTOLIC BLOOD PRESSURE: 110 MMHG | HEART RATE: 60 BPM | DIASTOLIC BLOOD PRESSURE: 70 MMHG | RESPIRATION RATE: 14 BRPM

## 2019-07-23 DIAGNOSIS — G44.86 CERVICOGENIC HEADACHE: ICD-10-CM

## 2019-07-23 DIAGNOSIS — M48.02 DEGENERATIVE CERVICAL SPINAL STENOSIS: ICD-10-CM

## 2019-07-23 DIAGNOSIS — M54.81 BILATERAL OCCIPITAL NEURALGIA: Primary | ICD-10-CM

## 2019-07-23 PROCEDURE — 76942 ECHO GUIDE FOR BIOPSY: CPT | Performed by: OTHER

## 2019-07-23 PROCEDURE — 99214 OFFICE O/P EST MOD 30 MIN: CPT | Performed by: OTHER

## 2019-07-23 PROCEDURE — 20553 NJX 1/MLT TRIGGER POINTS 3/>: CPT | Performed by: OTHER

## 2019-07-23 RX ORDER — BUPIVACAINE HYDROCHLORIDE 5 MG/ML
9 INJECTION, SOLUTION PERINEURAL ONCE
Status: COMPLETED | OUTPATIENT
Start: 2019-07-23 | End: 2019-07-23

## 2019-07-23 RX ORDER — TRIAMCINOLONE ACETONIDE 40 MG/ML
40 INJECTION, SUSPENSION INTRA-ARTICULAR; INTRAMUSCULAR ONCE
Status: CANCELLED | OUTPATIENT
Start: 2019-07-23 | End: 2019-07-23

## 2019-07-23 RX ORDER — TRIAMCINOLONE ACETONIDE 40 MG/ML
40 INJECTION, SUSPENSION INTRA-ARTICULAR; INTRAMUSCULAR ONCE
Status: COMPLETED | OUTPATIENT
Start: 2019-07-23 | End: 2019-07-23

## 2019-07-23 NOTE — PATIENT INSTRUCTIONS
Refill policies:    • Allow 2-3 business days for refills; controlled substances may take longer.   • Contact your pharmacy at least 5 days prior to running out of medication and have them send an electronic request or submit request through the “request re Depending on your insurance carrier, approval may take 3-10 days. It is highly recommended patients contact their insurance carrier directly to determine coverage.   If test is done without insurance authorization, patient may be responsible for the entire signed prior to proceeding with procedure(s). Furthermore, patient notified that they should contact their insurer to verify that your procedure/test has been approved and is a COVERED benefit.   Although the [facility] staff does its due diligence, the insuran

## 2019-07-23 NOTE — PROGRESS NOTES
[facility] Neurology outpatient progress note  Date of service: 7/23/2019    Patient here for a follow-up visit for worsening neck pain. He does not want PT. MRI is not done yet, has not seen neurosurgery yet. Not using CPAP in the past 3 years.   Had stroke and 1/25/2019, got injection 10/10, after injection was 8/10, he woke up with HA 8/10 daily basis. He was having AMALIA, was on CPAP, but it did not help, he stopped it on his own.      REVIEW OF SYSTEMS:  A 10-point system was reviewed.  Pertinent positives and n Date   • ANGIOGRAM     • BIOPSY OF PROSTATE,NEEDLE/PUNCH  2009    benign   • COLOSTOMY      had reversal   • KNEE REPLACEMENT SURGERY  1986    left knee replacement   • KNEE REPLACEMENT SURGERY  1986    left knee   • OTHER SURGICAL HISTORY  1999    right s chronic     Plan:   MRI c spine needed and see neurosurgery after MRI  Nerve block and trigger points injection done today in office after benefit/risk explained and consent obtained  neurontin 300 mg QID  Pulmonary/Sleep medicine to see  Cont  mg,

## 2019-07-23 NOTE — TELEPHONE ENCOUNTER
Pt in for visit with Dr. Kole Palmer today. Appeal form signed by pt. Also needs to be signed by Dr. Kole Palmer as well. Placed in his inbox in [place] to review.

## 2019-07-23 NOTE — PROCEDURES
Date of service: 7/23/2019  Procedure: trigger point injections  Indication: Myofascial pain syndrome, neck     Procedure description:  The areas are prepped with betadine scrub, followed by alcohol, to insure a sterile injection site.  The materials necess

## 2019-07-26 ENCOUNTER — TELEPHONE (OUTPATIENT)
Dept: NEUROLOGY | Facility: CLINIC | Age: 64
End: 2019-07-26

## 2019-07-26 NOTE — TELEPHONE ENCOUNTER
Faxed signed KUNAL for appeal to [facility]. Fax confirmation received. Copy sent to scanning. Copy in scanning file.

## 2019-08-04 DIAGNOSIS — M54.81 BILATERAL OCCIPITAL NEURALGIA: Primary | ICD-10-CM

## 2019-08-05 RX ORDER — GABAPENTIN 300 MG/1
CAPSULE ORAL
Qty: 120 CAPSULE | Refills: 2 | Status: SHIPPED | OUTPATIENT
Start: 2019-08-05 | End: 2019-08-07

## 2019-08-05 NOTE — TELEPHONE ENCOUNTER
Medication: GABAPENTIN 300 MG Oral Cap    Date of last refill: 04/03/19 (#120/2)  [place]    Last office visit: 7/23/2019  Due back to clinic per last office note:  Around 10/23/19  Date next office visit scheduled:

## 2019-08-07 ENCOUNTER — OFFICE VISIT (OUTPATIENT)
Dept: NEUROLOGY | Facility: CLINIC | Age: 64
End: 2019-08-07
Payer: COMMERCIAL

## 2019-08-07 VITALS
BODY MASS INDEX: 39 KG/M2 | HEART RATE: 56 BPM | DIASTOLIC BLOOD PRESSURE: 58 MMHG | RESPIRATION RATE: 14 BRPM | SYSTOLIC BLOOD PRESSURE: 116 MMHG | WEIGHT: 242 LBS

## 2019-08-07 DIAGNOSIS — G44.86 CERVICOGENIC HEADACHE: ICD-10-CM

## 2019-08-07 DIAGNOSIS — M50.320 OTHER CERVICAL DISC DEGENERATION, MID-CERVICAL REGION, UNSPECIFIED LEVEL: Primary | ICD-10-CM

## 2019-08-07 PROCEDURE — 99215 OFFICE O/P EST HI 40 MIN: CPT | Performed by: OTHER

## 2019-08-07 RX ORDER — GABAPENTIN 600 MG/1
600 TABLET ORAL 3 TIMES DAILY
Qty: 90 TABLET | Refills: 1 | Status: SHIPPED | OUTPATIENT
Start: 2019-08-07 | End: 2019-11-13

## 2019-08-07 NOTE — PROGRESS NOTES
[facility] Neurology outpatient progress note  Date of service: 8/7/2019    Pt states condition has not improved. First TPI lasted 3 weeks, second injection \"wasn't even close. \" Pt reports daily headaches still however throbbing has subsided.  Pt reports he has back, he feels more HA nothing helped, he went to ER on 1/2/2019 and 1/25/2019 due to constant HA, he had recent MRI, MRA brain and neck, showed no acute change.  I saw him on 1/7/2019 in office when he was found to have neck pain and cervical radicular liv History:   Diagnosis Date   • Acute, but ill-defined, cerebrovascular disease    • Arthritis    • BPH (benign prostatic hyperplasia) 9/19/2014   • CAD, multiple vessel 0335,5,04    nonocclusive   • Coronary atherosclerosis    • Diverticulitis    • High blo XII tongue midline, normal motility, no atrophy  Motor strength: 5/5 all extremities  Tone: normal  DTRs: + symmetric  Plantar response: bilateral flexor  Coordination: Normal FTN  Sensory: symmetric to PP and LT  Gait: nl  Romberg: deferred  Neck: suppl Carlos Veloz MD  Neurology  [facility]  8/7/2019, 1:11 PM  Stone Acevedo MD

## 2019-08-07 NOTE — PROGRESS NOTES
Pt here for TPI. Pt states condition has not changed. First TPI lasted 3 weeks, second injection \"wasn't even close. \" Pt reports daily headaches still however throbbing has subsided.  Pt reports he has been taking OTC Excedrin which has helped, pt also

## 2019-08-09 NOTE — TELEPHONE ENCOUNTER
Received letter in BÁRBARA on 8/6/2019 and it stated that [facility] has denied the appeal for the MRI due to time constraint of 30 days. Per the letter, BÁRBARA should submit a new PA request for the MRI along with medical records.     Member number is [id number]

## 2019-08-13 ENCOUNTER — OFFICE VISIT (OUTPATIENT)
Dept: PAIN CLINIC | Facility: CLINIC | Age: 64
End: 2019-08-13
Payer: COMMERCIAL

## 2019-08-13 VITALS
WEIGHT: 235 LBS | OXYGEN SATURATION: 98 % | HEIGHT: 66 IN | DIASTOLIC BLOOD PRESSURE: 82 MMHG | SYSTOLIC BLOOD PRESSURE: 116 MMHG | HEART RATE: 110 BPM | BODY MASS INDEX: 37.77 KG/M2

## 2019-08-13 DIAGNOSIS — G44.86 CERVICOGENIC HEADACHE: ICD-10-CM

## 2019-08-13 DIAGNOSIS — I65.01 OCCLUSION OF RIGHT VERTEBRAL ARTERY: Primary | ICD-10-CM

## 2019-08-13 DIAGNOSIS — R41.3 MEMORY DEFICIT: ICD-10-CM

## 2019-08-13 DIAGNOSIS — G44.001 INTRACTABLE CLUSTER HEADACHE SYNDROME, UNSPECIFIED CHRONICITY PATTERN: ICD-10-CM

## 2019-08-13 PROCEDURE — 99203 OFFICE O/P NEW LOW 30 MIN: CPT | Performed by: ANESTHESIOLOGY

## 2019-08-13 NOTE — PROGRESS NOTES
Patient presents in office today with reported pain in head across eyes, into temple, around head to occipital region and neck. Patient states that when he turns his head he hears a snap. Reporting headaches daily.  Patient states he had a stroke a couple o

## 2019-08-13 NOTE — PROGRESS NOTES
Went in room to speak with patient in regards to procedures and prior auth/medical clearance process. Patient seemed confused about recommended procedures and asked to speak with Dr. Nohelia Menchaca again. After, I went in again and continued explaining process.  Annmarie

## 2019-08-13 NOTE — H&P
Name: Jaimee Sin   : [id number]   DOS: 2019     Chief complaint: Neck pain and headache    History of present illness:  Jaimee Sin is a 59year old male with a history of left cerebellar stroke in 2017 who was referred b LISINOPRIL 10 MG Oral Tab TAKE ONE TABLET BY MOUTH ONE TIME DAILY  Disp: 90 tablet Rfl: 0   acetaminophen 500 MG Oral Tab Take 1,000 mg by mouth every 6 (six) hours as needed for Pain.  Disp:  Rfl:    atorvastatin 80 MG Oral Tab TAKE ONE TABLET BY MOUTH N Examination:    (R)   (L)  Deltoid:       5    5  Biceps:       5    5  Triceps:      5    5  Wrist Extension:     5    5  Wrist Flexsion:                 5    5  Finger Extension:     5    5  Finger Flexsion:      5    5    Neurologic:  Cranial nerves II

## 2019-08-16 ENCOUNTER — TELEPHONE (OUTPATIENT)
Dept: PAIN CLINIC | Facility: CLINIC | Age: 64
End: 2019-08-16

## 2019-08-16 ENCOUNTER — TELEPHONE (OUTPATIENT)
Dept: INTERNAL MEDICINE CLINIC | Facility: CLINIC | Age: 64
End: 2019-08-16

## 2019-08-16 ENCOUNTER — TELEPHONE (OUTPATIENT)
Dept: SURGERY | Facility: CLINIC | Age: 64
End: 2019-08-16

## 2019-08-16 NOTE — TELEPHONE ENCOUNTER
See previous TE, patient/cousin to speak with neurology regarding holding aspirin. Patient's cousin Lady Soliman advised of this.

## 2019-08-16 NOTE — TELEPHONE ENCOUNTER
Spoke to Jaime Machuca, pt's cousin and POA, who states pt contacted him and states he may go to the ED because of CHAVEZ. Jaime Machuca wanted more information on what was discussed during pt's LOV as pt was unable to provide full details d/t short term memory issues.  Re

## 2019-08-16 NOTE — TELEPHONE ENCOUNTER
Patient's cousin, Leslie De Paz (on HIPAA), said Dr Janna Tse is recommending facet joint injections to help with patient's headaches. Patient would need to stop taking aspirin for 7 days. Would Dr Jacobo Tellez recommend doing so based on his health history?   Please advis

## 2019-08-16 NOTE — TELEPHONE ENCOUNTER
Dr. Bree Rasheed sent a staff message regarding this.  RN called and spoke with patient's brother Aba Carrion, on HIPAA, to advise of Dr. Stephanie Jimenez recommendation that he speak with the patient's neurologist regarding holding aspirin as patient was placed on it after a

## 2019-08-16 NOTE — TELEPHONE ENCOUNTER
Medical clearance needed-  mg / Dr. Jo Corley    Pt evaluatedby Dr. Judye Duverney and recommended for cervical facet injection (X 2). Please begin PA process for procedure(s). Laterality:  Left, then right  Level(s):  C4-7    Pt informed callback will be given when PA feedback received and procedure date to be scheduled after approval.  All procedural instructions reviewed, procedure line number provided. Pt verbalized understanding and agreeable to plan. Copy of instructions provided.     Implanted cardiac device/SCS/PNS: No

## 2019-08-16 NOTE — TELEPHONE ENCOUNTER
Spoke with patient's brother Leonie Garcia, on HIPAA, to advise of recommendation to follow up with neurology regarding this request. Leonie Garcia verbalized understanding.

## 2019-08-16 NOTE — TELEPHONE ENCOUNTER
Incoming (mail or fax): Fax  Received from: [address]   Documentation given to: Triage (Pre-op bin)     *Contatcted patient to schedule pre-op apt for medical clearance.  Pt stated he is not sure if he needs clearance and requested we contact his

## 2019-08-19 NOTE — TELEPHONE ENCOUNTER
Response to Medical Clearance Letter from Dr Ronnell Buerger to defer to Dr Franco Cedeno for upcoming Dr Rikc Paiz procedures endorsed to Pain Nurse Medical Clearance folder in [address],[place] [phone]

## 2019-08-19 NOTE — TELEPHONE ENCOUNTER
Ranjith Mae MD  You 14 minutes ago (8:22 AM)      Would be left followed by right. C4-7    Routing comment      Medical clearance letter addended to reflect 2 procedures instead of one.

## 2019-08-19 NOTE — TELEPHONE ENCOUNTER
Spoke to pt's cousin, Vicky Mclaughlin to explain that 2 procedures are being recommended, not just one. Escobar verbalized understanding. Vicky Mclaughlin further stated that pt tried medical marijuana over the weekend w/ excellent relief of his CHAVEZ.  Pt is interested in p

## 2019-08-19 NOTE — TELEPHONE ENCOUNTER
Susana Garcia MD  You 14 minutes ago (8:22 AM)      Would be left followed by right.  C4-7    Routing comment

## 2019-08-21 NOTE — TELEPHONE ENCOUNTER
Completed [employer] PA Form for Left FACET ([id number]) attached with clinical information faxed to [phone] received.

## 2019-08-21 NOTE — TELEPHONE ENCOUNTER
Response received to Medical Clearance Letter from Dr Jessica Hurtado for upcoming Dr Alexandra Luz procedures,endorsed to Pain Nurse Medical Clearance folder in y [address] [phone]

## 2019-08-21 NOTE — TELEPHONE ENCOUNTER
Called cousin, Leslie De Paz and notified of medical clearance approval. Advised prior auth still pending. Leslie De Paz stated that he is not sure if patient will proceed with injections, but will talk to him.  He will be calling if deciding to move forward with proce

## 2019-08-26 NOTE — TELEPHONE ENCOUNTER
Per Dr. Enoch Tenorio: To confirm that pt has tried/failed at least 6 weeks of PT with at least 2 sessions per week.  Can then resubmit for approval.

## 2019-08-26 NOTE — TELEPHONE ENCOUNTER
TAMARA Vora requesting Peer to Peer for Cervical MRI ([id number]) due to request not meeting medical criteria and could potentially be Denied.      Provider can contact Bimal Mcgregor and request to speak to physician reviewer at [phone], Give Tracking #:[id number]

## 2019-08-26 NOTE — TELEPHONE ENCOUNTER
Per Dr. Blas Choi, wanting to move forward with peer to peer. Transferred Dr. Ashanti Schilling to Dr. Blas Choi.

## 2019-08-26 NOTE — TELEPHONE ENCOUNTER
Spoke at length with pt. States he went to PT facility outside of [address] in [address]. PT was originally ordered by Dr. Clayton Tran in January. States he only had 3 sessions with PT.  Was told that they had to stop due to not having recent imaging on file (pt men

## 2019-08-27 NOTE — TELEPHONE ENCOUNTER
Cervical MRI ([id number]) Denied     Denial Reason:    The study requested does not meet these guidelines. Thus, it cant be approved.  Prior to a request for an Cervical MRI, guidelines state that the following notes should be given: Doctor's notes that say you

## 2019-08-27 NOTE — TELEPHONE ENCOUNTER
Prior authorization request completed for: Left FACET   Authorization #:[id number]  Spoke with Jeff Chong at [facility] -Confirmed Authorization     Authorization dates: 09/09/19 -12/07/19  CPT codes approved: 59439,43867,84387  Number of visits/dates of service approved:1  Physician: Terry Cool     Patient can be scheduled

## 2019-08-27 NOTE — TELEPHONE ENCOUNTER
I spoke to Dr Ok Forbes from insurance company at [facility] who states pt needs neck stretches (physical therapy, or medical directed home exercise program) for 6 weeks in the last 6 months before MRI is approved; if pt can confirm above, then I can put in note

## 2019-08-28 ENCOUNTER — OFFICE VISIT (OUTPATIENT)
Dept: NEUROLOGY | Facility: CLINIC | Age: 64
End: 2019-08-28
Payer: COMMERCIAL

## 2019-08-28 VITALS
DIASTOLIC BLOOD PRESSURE: 68 MMHG | SYSTOLIC BLOOD PRESSURE: 126 MMHG | BODY MASS INDEX: 40 KG/M2 | RESPIRATION RATE: 16 BRPM | WEIGHT: 245 LBS | HEART RATE: 70 BPM

## 2019-08-28 DIAGNOSIS — M54.2 NECK PAIN: Primary | ICD-10-CM

## 2019-08-28 PROCEDURE — 99215 OFFICE O/P EST HI 40 MIN: CPT | Performed by: OTHER

## 2019-08-28 NOTE — PROGRESS NOTES
[facility] Neurology outpatient progress note  Date of service: 8/28/2019    The patient is here for a follow-up for worsening neck pain and headaches. Patient states headaches have increased since his last office visit.  Decrease in balance and has fallen recentl HCl 25 MG Oral Tab, Take 1 tablet (25 mg total) by mouth 3 (three) times daily as needed for Dizziness. , Disp: 20 tablet, Rfl: 0  •  aspirin 325 MG Oral Tab, Take 1 tablet (325 mg total) by mouth daily. , Disp: 30 tablet, Rfl: 2  Allergies:  No Known Allerg 2-3 mm equal and reactive to light  III, IV, VI extraocular movements intact, no nystagmus, no ptosis  V face sensation normal  VII face symmetry  VIII hearing normal  IX, X, XI palate elevates symmetric   XII tongue midline, normal motility, no atrophy  M MRI c spine, imaging finding, cervicogenic headache, overuse of tylenol, pain management for TONG in neck, untreated AMALIA and its association with neurological conditions such as stroke/headache;  and detailed care plan.     Adowa De La Cruz MD  [address] [place] [address]

## 2019-08-28 NOTE — PROGRESS NOTES
The patient is here for a follow-up for headaches. Patient states headaches have increased since his last office visit. The patient is having short ter memory loss. On and off blurry visions. Decrease in balance and has fallen recently.

## 2019-08-28 NOTE — TELEPHONE ENCOUNTER
OV today - Updated notes    From note from Dr. Agustín Tolentino:    Shamar Fernández MD  [address] [facility] Nurse             pls appeal MRI c spine, with new documentation. YZ      Need to discuss with PA.

## 2019-09-03 RX ORDER — TAMSULOSIN HYDROCHLORIDE 0.4 MG/1
0.4 CAPSULE ORAL DAILY
Qty: 90 CAPSULE | Refills: 3 | Status: SHIPPED | OUTPATIENT
Start: 2019-09-03 | End: 2019-10-03

## 2019-09-03 NOTE — TELEPHONE ENCOUNTER
Pt LOV with Dr. Escamilla Creemmett 7/8/19 pt pharmacy requesting refill on tamsulosin if you agree please review and sign med. I copied and pasted part of [facility] note below.     ASSESSMENT/PLAN:   Assessment   Benign prostatic hyperplasia with lower urinary tract symptoms

## 2019-09-06 NOTE — TELEPHONE ENCOUNTER
Spoke with Rob Nicole in [address] and request can be submitted. Rob Nicole will resubmit and will update BÁRBARA.

## 2019-09-16 ENCOUNTER — TELEPHONE (OUTPATIENT)
Dept: INTERNAL MEDICINE CLINIC | Facility: CLINIC | Age: 64
End: 2019-09-16

## 2019-09-16 NOTE — TELEPHONE ENCOUNTER
Patient called to schedule appointment for stomach ache and diarrhea. Call routed to triage by PSR. Patient reports a stomach ache around his navel, just a dull ache in nature. Patient reports diarrhea for the last 3 days.  Patient reports voiding 3-4 ti

## 2019-09-26 NOTE — TELEPHONE ENCOUNTER
Called patient's cousin and left message to call back to inform of the below. Called patient and left detailed message informing him that Dr. Cehng Agent would like patient to get MRI.  Instructed patient to call central scheduling (number given) and laurence

## 2019-09-26 NOTE — TELEPHONE ENCOUNTER
Called patient to question whether he had scheduled MRI C-spine and he states he was not aware it was approved and he would like to confirm that provider would still like MRI-Cspine. Routed to provider.

## 2019-09-30 NOTE — TELEPHONE ENCOUNTER
Per Epic review, patient is scheduled for 10/4/2019 MRI. Approval is good until 10/6/2019. This encounter will be closed.

## 2019-10-04 ENCOUNTER — HOSPITAL ENCOUNTER (OUTPATIENT)
Dept: MRI IMAGING | Facility: HOSPITAL | Age: 64
Discharge: HOME | End: 2019-10-04
Attending: Other
Payer: COMMERCIAL

## 2019-10-04 DIAGNOSIS — M48.02 DEGENERATIVE CERVICAL SPINAL STENOSIS: ICD-10-CM

## 2019-10-04 PROCEDURE — 72141 MRI NECK SPINE W/O DYE: CPT | Performed by: OTHER

## 2019-10-07 ENCOUNTER — OFFICE VISIT (OUTPATIENT)
Dept: INTERNAL MEDICINE CLINIC | Facility: CLINIC | Age: 64
End: 2019-10-07
Payer: COMMERCIAL

## 2019-10-07 VITALS
HEART RATE: 75 BPM | WEIGHT: 232.5 LBS | BODY MASS INDEX: 37.37 KG/M2 | TEMPERATURE: 98 F | HEIGHT: 66 IN | OXYGEN SATURATION: 98 % | RESPIRATION RATE: 14 BRPM | DIASTOLIC BLOOD PRESSURE: 90 MMHG | SYSTOLIC BLOOD PRESSURE: 140 MMHG

## 2019-10-07 DIAGNOSIS — I10 ESSENTIAL HYPERTENSION: ICD-10-CM

## 2019-10-07 DIAGNOSIS — E78.2 MIXED HYPERLIPIDEMIA: ICD-10-CM

## 2019-10-07 DIAGNOSIS — Z23 NEED FOR VACCINATION: ICD-10-CM

## 2019-10-07 DIAGNOSIS — Z12.11 COLON CANCER SCREENING: ICD-10-CM

## 2019-10-07 DIAGNOSIS — R42 DIZZINESS AND GIDDINESS: ICD-10-CM

## 2019-10-07 DIAGNOSIS — G44.86 CERVICOGENIC HEADACHE: ICD-10-CM

## 2019-10-07 DIAGNOSIS — Z00.00 ROUTINE GENERAL MEDICAL EXAMINATION AT A HEALTH CARE FACILITY: Primary | ICD-10-CM

## 2019-10-07 DIAGNOSIS — N18.30 CKD (CHRONIC KIDNEY DISEASE), STAGE III (HCC): ICD-10-CM

## 2019-10-07 PROBLEM — G44.209 TENSION TYPE HEADACHE: Status: RESOLVED | Noted: 2019-01-07 | Resolved: 2019-10-07

## 2019-10-07 PROBLEM — M54.2 NECK PAIN: Status: RESOLVED | Noted: 2019-01-07 | Resolved: 2019-10-07

## 2019-10-07 PROCEDURE — 90472 IMMUNIZATION ADMIN EACH ADD: CPT | Performed by: INTERNAL MEDICINE

## 2019-10-07 PROCEDURE — 99396 PREV VISIT EST AGE 40-64: CPT | Performed by: INTERNAL MEDICINE

## 2019-10-07 PROCEDURE — 90471 IMMUNIZATION ADMIN: CPT | Performed by: INTERNAL MEDICINE

## 2019-10-07 PROCEDURE — 90732 PPSV23 VACC 2 YRS+ SUBQ/IM: CPT | Performed by: INTERNAL MEDICINE

## 2019-10-07 PROCEDURE — 90686 IIV4 VACC NO PRSV 0.5 ML IM: CPT | Performed by: INTERNAL MEDICINE

## 2019-10-07 RX ORDER — MECLIZINE HYDROCHLORIDE 25 MG/1
25 TABLET ORAL 3 TIMES DAILY PRN
Qty: 20 TABLET | Refills: 1 | Status: SHIPPED | OUTPATIENT
Start: 2019-10-07 | End: 2020-12-07 | Stop reason: ALTCHOICE

## 2019-10-07 RX ORDER — FENOFIBRIC ACID 135 MG/1
1 CAPSULE, DELAYED RELEASE ORAL
Qty: 90 CAPSULE | Refills: 1 | Status: SHIPPED | OUTPATIENT
Start: 2019-10-07 | End: 2020-11-12

## 2019-10-07 RX ORDER — METOPROLOL SUCCINATE 50 MG/1
50 TABLET, EXTENDED RELEASE ORAL
Qty: 90 TABLET | Refills: 1 | Status: SHIPPED | OUTPATIENT
Start: 2019-10-07 | End: 2020-05-23

## 2019-10-07 RX ORDER — LISINOPRIL 10 MG/1
10 TABLET ORAL
Qty: 90 TABLET | Refills: 1 | Status: SHIPPED | OUTPATIENT
Start: 2019-10-07 | End: 2020-05-23

## 2019-10-07 RX ORDER — ATORVASTATIN CALCIUM 80 MG/1
TABLET, FILM COATED ORAL
Qty: 90 TABLET | Refills: 1 | Status: ON HOLD | OUTPATIENT
Start: 2019-10-07 | End: 2020-11-23 | Stop reason: CLARIF

## 2019-10-07 NOTE — PROGRESS NOTES
Kendal Gonzales is a 62year old male who presents for a complete physical exam.   HPI:   He is also here for f/u on DL, HTN, CAD, RLS, hx of stroke, cervicogenic headaches, and CKD 3.   He saw  this year and LUTS had resolved, due to have PSA in th (restless legs syndrome)     Benign fasciculation-cramp syndrome     Drug-induced erectile dysfunction     AMALIA (obstructive sleep apnea)     Occlusion of right vertebral artery     CKD (chronic kidney disease), stage III (HCC)     Cluster headaches     Mem well developed, well nourished,in no apparent distress  SKIN: no rashes,no suspicious lesions  HEENT: atraumatic, normocephalic,ears and throat are clear  EYES:PERRLA, EOMI, normal optic disk,conjunctiva are clear, no scleral icterus  NECK: supple,no adeno Patient Instructions on file for this visit.     Meds & Refills for this Visit:  Requested Prescriptions     Signed Prescriptions Disp Refills   • atorvastatin 80 MG Oral Tab 90 tablet 1     Sig: TAKE ONE TABLET BY MOUTH NIGHTLY   • Choline Fenofibrate (FEN

## 2019-10-17 ENCOUNTER — TELEPHONE (OUTPATIENT)
Dept: INTERNAL MEDICINE CLINIC | Facility: CLINIC | Age: 64
End: 2019-10-17

## 2019-10-17 NOTE — TELEPHONE ENCOUNTER
This patient needs to be seen if he has been sick for 10 days, could have PNA  NOTE[de-identified] these symptoms are not, in fact, side effects to his injections.  This is a respiratory infection caused by a virus , not the shots

## 2019-10-17 NOTE — TELEPHONE ENCOUNTER
Pt called, very upset. Pt states was here on 10/7/19 for a PE & received \"2 flu shots\". Clarified with pt that he received 1 flu vaccine (FLULAVAL) & 1 pneumococcal vaccine (PNEUMOVAX 23).   Pt states \"he's been sicker than a dog since he received shot

## 2019-10-17 NOTE — TELEPHONE ENCOUNTER
Spoke to pt. Pt states he became sick as soon as he got home from that [address] after receiving the vaccinations. Pt states it started with the headaches & then the respiratory symptoms & body aches later that night.   Noted pt is just now reporting this to our

## 2019-10-18 NOTE — TELEPHONE ENCOUNTER
Unable to document what patient is asking and also unable to exempt him from financial responsibility, unfortunately.  I still do very much advise that pt seek medical attention to r/o lung or lower resp tract infection due to the protracted nature of his a

## 2019-10-21 NOTE — TELEPHONE ENCOUNTER
[employer] message sent to patient with Dr. George Dalton note below as patient stated he would be out of town on Saturday.

## 2019-11-08 ENCOUNTER — TELEPHONE (OUTPATIENT)
Dept: NEUROLOGY | Facility: CLINIC | Age: 64
End: 2019-11-08

## 2019-11-08 NOTE — TELEPHONE ENCOUNTER
Patient's brother Jarocho Rodriguez notified of MRI results (ok per HIPAA consent) and verbalized understanding.

## 2019-11-08 NOTE — TELEPHONE ENCOUNTER
Please refer to my 10/4/19 10:48 am result comments which should be already relayed to him. Not sure why he is requesting result again. \"MRI c spine showed stable cervical degenerative diseases with evidence of foraminal stenosis.  No immediate action r

## 2019-11-13 ENCOUNTER — OFFICE VISIT (OUTPATIENT)
Dept: NEUROLOGY | Facility: CLINIC | Age: 64
End: 2019-11-13
Payer: COMMERCIAL

## 2019-11-13 VITALS
DIASTOLIC BLOOD PRESSURE: 72 MMHG | HEART RATE: 74 BPM | SYSTOLIC BLOOD PRESSURE: 126 MMHG | RESPIRATION RATE: 18 BRPM | WEIGHT: 222 LBS | BODY MASS INDEX: 36 KG/M2

## 2019-11-13 DIAGNOSIS — R26.9 GAIT DISORDER: ICD-10-CM

## 2019-11-13 DIAGNOSIS — M50.90 CERVICAL NECK PAIN WITH EVIDENCE OF DISC DISEASE: Primary | ICD-10-CM

## 2019-11-13 PROCEDURE — 99215 OFFICE O/P EST HI 40 MIN: CPT | Performed by: OTHER

## 2019-11-13 RX ORDER — PREGABALIN 50 MG/1
50 CAPSULE ORAL 3 TIMES DAILY
Qty: 90 CAPSULE | Refills: 1 | Status: SHIPPED | OUTPATIENT
Start: 2019-11-13 | End: 2019-12-03

## 2019-11-13 NOTE — PROGRESS NOTES
Pt following up for headaches. Pt states he still has the headaches at the same frequency. Pt c/o his balance getting worse since last visit.

## 2019-11-13 NOTE — PROGRESS NOTES
[facility] Neurology outpatient progress note  Date of service: 11/13/2019    The patient is here for a follow-up for worsening neck pain and headaches. Patient states headaches have increased since his last office visit.  Decrease in balance and has fallen recent Allergies  Past Medical History:   Diagnosis Date   • Acute, but ill-defined, cerebrovascular disease    • Arthritis    • BPH (benign prostatic hyperplasia) 9/19/2014   • CAD, multiple vessel 9826,3,05    nonocclusive   • Coronary atherosclerosis    • Dive motility, no atrophy  Motor strength: 5/5 all extremities  Tone: normal  DTRs: + symmetric  Plantar response: bilateral flexor  Coordination: Normal FTN  Sensory: symmetric  Gait: unsteady  Romberg: deferred  Neck: supple  Test reviewed on 11/13/2019    A/ MD  Neurology  [facility]  11/13/2019, 4:09 PM  Demetra Clemente MD

## 2019-11-20 ENCOUNTER — TELEPHONE (OUTPATIENT)
Dept: NEUROLOGY | Facility: CLINIC | Age: 64
End: 2019-11-20

## 2019-11-20 DIAGNOSIS — M54.2 NECK PAIN: Primary | ICD-10-CM

## 2019-11-20 DIAGNOSIS — M50.90 CERVICAL NECK PAIN WITH EVIDENCE OF DISC DISEASE: ICD-10-CM

## 2019-11-20 NOTE — TELEPHONE ENCOUNTER
Electronic PA started for Pregabalin 50mg tid by Juarez Olivo on [facility], [id number]. Prior Authorization request sent to PA Dept.

## 2019-11-24 ENCOUNTER — HOSPITAL ENCOUNTER (EMERGENCY)
Age: 64
Discharge: HOME | End: 2019-11-24
Attending: EMERGENCY MEDICINE
Payer: COMMERCIAL

## 2019-11-24 VITALS
WEIGHT: 240 LBS | OXYGEN SATURATION: 98 % | SYSTOLIC BLOOD PRESSURE: 140 MMHG | BODY MASS INDEX: 38.57 KG/M2 | RESPIRATION RATE: 18 BRPM | HEIGHT: 66 IN | DIASTOLIC BLOOD PRESSURE: 96 MMHG | TEMPERATURE: 98 F | HEART RATE: 80 BPM

## 2019-11-24 DIAGNOSIS — R33.9 URINARY RETENTION: Primary | ICD-10-CM

## 2019-11-24 DIAGNOSIS — G25.81 RLS (RESTLESS LEGS SYNDROME): Primary | ICD-10-CM

## 2019-11-24 PROCEDURE — 6370000000 HC RX 637 (ALT 250 FOR IP)

## 2019-11-24 PROCEDURE — 52000 CYSTOURETHROSCOPY: CPT

## 2019-11-24 PROCEDURE — 99283 EMERGENCY DEPT VISIT LOW MDM: CPT

## 2019-11-24 RX ORDER — LIDOCAINE HYDROCHLORIDE 20 MG/ML
JELLY TOPICAL PRN
Status: DISCONTINUED | OUTPATIENT
Start: 2019-11-24 | End: 2019-11-24 | Stop reason: HOSPADM

## 2019-11-24 RX ORDER — TAMSULOSIN HYDROCHLORIDE 0.4 MG/1
0.4 CAPSULE ORAL DAILY
Qty: 10 CAPSULE | Refills: 0 | Status: SHIPPED | OUTPATIENT
Start: 2019-11-24

## 2019-11-24 RX ORDER — LIDOCAINE HYDROCHLORIDE 20 MG/ML
JELLY TOPICAL
Status: COMPLETED
Start: 2019-11-24 | End: 2019-11-24

## 2019-11-24 RX ADMIN — LIDOCAINE HYDROCHLORIDE: 20 JELLY TOPICAL at 02:16

## 2019-11-24 SDOH — HEALTH STABILITY: MENTAL HEALTH: HOW OFTEN DO YOU HAVE A DRINK CONTAINING ALCOHOL?: NEVER

## 2019-11-24 ASSESSMENT — ENCOUNTER SYMPTOMS
WHEEZING: 0
COLOR CHANGE: 0
CHEST TIGHTNESS: 0
PHOTOPHOBIA: 0
NAUSEA: 0
BACK PAIN: 0
SHORTNESS OF BREATH: 0
EYE PAIN: 0
ABDOMINAL PAIN: 0
DIARRHEA: 0
TROUBLE SWALLOWING: 0
SINUS PRESSURE: 0
CONSTIPATION: 0
VOMITING: 0

## 2019-11-24 ASSESSMENT — PAIN SCALES - GENERAL: PAINLEVEL_OUTOF10: 8

## 2019-11-24 ASSESSMENT — PAIN DESCRIPTION - LOCATION: LOCATION: GROIN

## 2019-11-25 ENCOUNTER — TELEPHONE (OUTPATIENT)
Dept: SURGERY | Facility: CLINIC | Age: 64
End: 2019-11-25

## 2019-11-25 RX ORDER — DULOXETIN HYDROCHLORIDE 60 MG/1
60 CAPSULE, DELAYED RELEASE ORAL DAILY
Qty: 30 CAPSULE | Refills: 2 | Status: SHIPPED | OUTPATIENT
Start: 2019-11-25 | End: 2020-07-29

## 2019-11-25 NOTE — TELEPHONE ENCOUNTER
Dr. Joanne Vega, pt had emergent alaniz placement yesterday for retention. Would you like to see him today at 2pm? You have an opening, or with RN tomorrow for voiding trail? Any lab orders prior to removal? Pt having some discomfort with alaniz.  Also please see

## 2019-11-25 NOTE — TELEPHONE ENCOUNTER
Spoke with Dr. Damari Blanchard regarding this pt to see if he would like to see the pt at 2:20pm today which is coming up in about an hour.      Per Dr. Damari Blanchard, patient can see RN for voiding trial. If no UA/UC drawn in ER yesterday RN to collect UA/UC at nurse visi

## 2019-11-25 NOTE — TELEPHONE ENCOUNTER
pt have pain and discomfort with catheter which was put in yesterday around 2am. in ER Dept in [place]. pts cousin wants to know what should pt do, or should he go to ER again. [place]. [place]

## 2019-11-25 NOTE — TELEPHONE ENCOUNTER
Pregabalin Prior Authorization denied for the following reasons per CMM. Denied on November 22  Unable to approve Pregabalin capsule 50 MG due to unmet criteria 4 and 5 as outlined in the plan guideline below.  Please use the following preferred formular

## 2019-11-25 NOTE — TELEPHONE ENCOUNTER
Spoke with pt, informed him of Pregabalin denial. Assessed if pt would be agreeable to starting Cybalta which he did agree to.

## 2019-11-26 ENCOUNTER — TELEPHONE (OUTPATIENT)
Dept: SURGERY | Facility: CLINIC | Age: 64
End: 2019-11-26

## 2019-11-26 ENCOUNTER — NURSE ONLY (OUTPATIENT)
Dept: SURGERY | Facility: CLINIC | Age: 64
End: 2019-11-26
Payer: COMMERCIAL

## 2019-11-26 DIAGNOSIS — R39.89 SUSPECTED UTI: Primary | ICD-10-CM

## 2019-11-26 DIAGNOSIS — R33.9 URINARY RETENTION: ICD-10-CM

## 2019-11-26 PROCEDURE — 51700 IRRIGATION OF BLADDER: CPT | Performed by: UROLOGY

## 2019-11-26 RX ORDER — ROPINIROLE 0.5 MG/1
TABLET, FILM COATED ORAL
Qty: 270 TABLET | Refills: 1 | Status: SHIPPED | OUTPATIENT
Start: 2019-11-26 | End: 2020-05-23

## 2019-11-26 NOTE — TELEPHONE ENCOUNTER
S/W pt and determined that he forgot to call to report and stated that he did not urinate since he left our office this morning and also stated that he did not drank anything all day either.  I told pt that if this continues with no urination after 6-8 hrs

## 2019-11-26 NOTE — PROGRESS NOTES
I called pt into the exam room and introduced myself and verified the spelling of his last name and his . I explained that I have orders to collect a urine specimen from the alaniz cath for UA and C&S and then perform a voiding trial/decath.  I explained its contents and I then cleansed the end of the alaniz cath with another alcohol swab and I then proceeded to fill the pt's bladder with 150 ml of 0.9 NS via gravity using the barrel of a johnathon syringe at which point pt stated that he felt full and thought

## 2019-11-26 NOTE — TELEPHONE ENCOUNTER
Last OV relevant to medication: 10/7/19  Last refill date: 6/6/19     #/refills: 270/0  When pt was asked to return for OV: 6 months  Upcoming appt/reason: 5/5/2020 F/U multiple issues

## 2019-11-26 NOTE — TELEPHONE ENCOUNTER
Per Epic review, provider sent Cymbalta Rx to pharmacy. Per below, pt aware of plan and agreeable to starting medication.

## 2019-11-26 NOTE — TELEPHONE ENCOUNTER
Pt called back to let nurse know that he is not able to come in earlier, as he has no transportation so pt. Will keep his 10:40am appt. For today.

## 2019-11-26 NOTE — TELEPHONE ENCOUNTER
Spoke to patient this morning to see if he wanted to come earlier for his nurse visit. Patient is going to check with his cousin, who is bringing him, and will call back.      REYNALDO, patient is calling back to either confirm his 10:40 am appt, or to accept a

## 2019-12-02 ENCOUNTER — TELEPHONE (OUTPATIENT)
Dept: SURGERY | Facility: CLINIC | Age: 64
End: 2019-12-02

## 2019-12-02 ENCOUNTER — TELEPHONE (OUTPATIENT)
Dept: NEUROLOGY | Facility: CLINIC | Age: 64
End: 2019-12-02

## 2019-12-02 ENCOUNTER — OFFICE VISIT (OUTPATIENT)
Dept: SURGERY | Facility: CLINIC | Age: 64
End: 2019-12-02
Payer: COMMERCIAL

## 2019-12-02 VITALS
SYSTOLIC BLOOD PRESSURE: 139 MMHG | HEART RATE: 86 BPM | DIASTOLIC BLOOD PRESSURE: 87 MMHG | BODY MASS INDEX: 36 KG/M2 | WEIGHT: 222 LBS

## 2019-12-02 DIAGNOSIS — M50.90 CERVICAL NECK PAIN WITH EVIDENCE OF DISC DISEASE: ICD-10-CM

## 2019-12-02 DIAGNOSIS — M54.2 NECK PAIN: Primary | ICD-10-CM

## 2019-12-02 DIAGNOSIS — R33.9 URINARY RETENTION: Primary | ICD-10-CM

## 2019-12-02 DIAGNOSIS — N40.1 BENIGN PROSTATIC HYPERPLASIA WITH LOWER URINARY TRACT SYMPTOMS, SYMPTOM DETAILS UNSPECIFIED: ICD-10-CM

## 2019-12-02 PROCEDURE — 99212 OFFICE O/P EST SF 10 MIN: CPT | Performed by: NURSE PRACTITIONER

## 2019-12-02 NOTE — PROGRESS NOTES
HPI:    Patient ID: Flaquito Landaverde is a 62year old male. HPI     Patient is a 62year old male who presents to the clinic for a follow up. Patient was recently in [place] visiting family when he developed suprapubic pressure.   He presented Dizziness. 20 tablet 1   • Metoprolol Succinate ER 50 MG Oral Tablet 24 Hr Take 1 tablet (50 mg total) by mouth once daily. 90 tablet 1   • acetaminophen 500 MG Oral Tab Take 1,000 mg by mouth every 6 (six) hours as needed for Pain.      • aspirin 325 MG Or Soft. Bowel sounds are normal. He exhibits no distension. Musculoskeletal: Normal range of motion. Neurological: He is alert and oriented to person, place, and time. Skin: Skin is warm and dry. Psychiatric: He has a normal mood and affect.

## 2019-12-02 NOTE — TELEPHONE ENCOUNTER
I tried to reach pt at the cell # listed and the line rang a few times and then it disconnected as though the pt hung up.  I then called the pt's hm # listed and that line rang and rang and then went to a fast busy signal.

## 2019-12-02 NOTE — TELEPHONE ENCOUNTER
Received fax from [address] [place] [address] requesting PA for Pregabalin. PA was started on [facility], [id number] by Emiliana.    PA initiated and sent to PA dept.

## 2019-12-03 NOTE — TELEPHONE ENCOUNTER
Per Epic review:    Pt on Cymbalta/Duloxetine 60mg 11/2019-present, Gabapentin 100-600mg strength 1/2019-8/2019    Also per TE 11/2019, pt & provider already aware of denial and Cymbalta was started at that time    Informed Dr. Shauna Solano to disregard at this t

## 2019-12-03 NOTE — TELEPHONE ENCOUNTER
There was a processing error on CMM so I called Shania  And spoke to pharmacy dept. The request for Pregabalin 50mg capsules has been denied twice - on 11-14-19 and 11-20-19. Jovana advised it is a non-preferred medication.  Not only do they need to know t

## 2020-01-07 ENCOUNTER — APPOINTMENT (OUTPATIENT)
Dept: PHYSICAL THERAPY | Age: 65
End: 2020-01-07
Payer: COMMERCIAL

## 2020-01-09 ENCOUNTER — APPOINTMENT (OUTPATIENT)
Dept: PHYSICAL THERAPY | Age: 65
End: 2020-01-09
Payer: COMMERCIAL

## 2020-01-14 ENCOUNTER — APPOINTMENT (OUTPATIENT)
Dept: PHYSICAL THERAPY | Age: 65
End: 2020-01-14
Payer: COMMERCIAL

## 2020-01-16 ENCOUNTER — APPOINTMENT (OUTPATIENT)
Dept: PHYSICAL THERAPY | Age: 65
End: 2020-01-16
Payer: COMMERCIAL

## 2020-01-16 ENCOUNTER — TELEPHONE (OUTPATIENT)
Dept: NEUROLOGY | Facility: CLINIC | Age: 65
End: 2020-01-16

## 2020-01-21 ENCOUNTER — APPOINTMENT (OUTPATIENT)
Dept: PHYSICAL THERAPY | Age: 65
End: 2020-01-21
Payer: COMMERCIAL

## 2020-01-23 ENCOUNTER — APPOINTMENT (OUTPATIENT)
Dept: PHYSICAL THERAPY | Age: 65
End: 2020-01-23
Payer: COMMERCIAL

## 2020-01-28 ENCOUNTER — APPOINTMENT (OUTPATIENT)
Dept: PHYSICAL THERAPY | Age: 65
End: 2020-01-28
Payer: COMMERCIAL

## 2020-02-04 ENCOUNTER — APPOINTMENT (OUTPATIENT)
Dept: PHYSICAL THERAPY | Age: 65
End: 2020-02-04

## 2020-03-04 ENCOUNTER — OFFICE VISIT (OUTPATIENT)
Dept: PHYSICAL THERAPY | Age: 65
End: 2020-03-04
Attending: Other
Payer: MEDICARE

## 2020-03-04 DIAGNOSIS — M50.90 CERVICAL NECK PAIN WITH EVIDENCE OF DISC DISEASE: ICD-10-CM

## 2020-03-04 PROCEDURE — 97162 PT EVAL MOD COMPLEX 30 MIN: CPT

## 2020-03-08 NOTE — PROGRESS NOTES
SPINE EVALUATION:   Referring Physician: Dr. Enoch Tenorio  Diagnosis: Cervical spine dysfunction     Date of Service: 3/4/2020     PATIENT SUMMARY   Ifeanyi Hockey is a 62year old male who presents to therapy today with complaints of chronic neck pain, a LE N/T.    ASSESSMENT  Cr Ayala presents to physical therapy evaluation with primary c/o chronic neck pain, audible crepitus and \"popping\", and headaches.   The results of the objective tests and measures show global loss of all cervical AROM except fl touch WNL's including 4/5 digits. VA testing in supine with sustained cervical rotation negative. Unable to perform Alar ligament stability test due to unable to palpate C2 due to tissue mass.   Hayward's negative    Today’s Treatment and Response:   Pt ed as soon as possible to [phone].  If you have any questions, please contact me at Dept: [phone]    Sincerely,  Electronically signed by therapist: Hollie Orozco, PT    [de-identified] certification required: Yes  I certify the need for these servic

## 2020-03-11 ENCOUNTER — OFFICE VISIT (OUTPATIENT)
Dept: PHYSICAL THERAPY | Age: 65
End: 2020-03-11
Attending: Other
Payer: MEDICARE

## 2020-03-11 PROCEDURE — 97110 THERAPEUTIC EXERCISES: CPT

## 2020-03-11 NOTE — PROGRESS NOTES
Diagnosis: Cervical spine dysfunction    Insurance Type (# Auth): 8 sessions by 4/15/2020     Treatment Number: 2 of 8    Subjective: Pt reports he has concerns about continuing therapy.   Would like definitve diagnosis and treatment for his ongoing complai decreased control with cranial and cervical flexor strength/stability exercises. Pt has hx of gait/balance difficulties.   Upon further questioning pt and niece report that more of a problem occurs with dizziness complaints with position changes with tr

## 2020-03-18 ENCOUNTER — APPOINTMENT (OUTPATIENT)
Dept: PHYSICAL THERAPY | Age: 65
End: 2020-03-18
Attending: Other
Payer: MEDICARE

## 2020-03-27 ENCOUNTER — TELEPHONE (OUTPATIENT)
Dept: NEUROLOGY | Facility: CLINIC | Age: 65
End: 2020-03-27

## 2020-03-27 RX ORDER — BUTALBITAL, ACETAMINOPHEN AND CAFFEINE 50; 325; 40 MG/1; MG/1; MG/1
1 TABLET ORAL EVERY 6 HOURS PRN
Qty: 12 TABLET | Refills: 0 | Status: SHIPPED | OUTPATIENT
Start: 2020-03-27 | End: 2020-05-26

## 2020-03-27 NOTE — TELEPHONE ENCOUNTER
Informed pt's cousin (ok per HIPPA) of rx. Educated on limiting medication and overuse headaches. Verbalized understanding, no further questions.

## 2020-03-27 NOTE — TELEPHONE ENCOUNTER
Patient having terrible headaches  wondering if Doctor can prescribe something different for headaches. Medication he is on is not working.

## 2020-03-27 NOTE — TELEPHONE ENCOUNTER
Per epic review the only medication our office prescribes for the pt is cymbalta for neck pain. Will route to provider for advisement.

## 2020-03-27 NOTE — TELEPHONE ENCOUNTER
Last OV note:    Overall complicated, multiple neurological conditions that are challenging to manage  (M50.90) Cervical neck pain with evidence of disc disease  (primary encounter diagnosis); worse  (R26.9) Gait disorder: worse  Cervicogenic headache  (pr

## 2020-04-01 ENCOUNTER — APPOINTMENT (OUTPATIENT)
Dept: PHYSICAL THERAPY | Age: 65
End: 2020-04-01
Payer: MEDICARE

## 2020-04-08 ENCOUNTER — APPOINTMENT (OUTPATIENT)
Dept: PHYSICAL THERAPY | Age: 65
End: 2020-04-08
Payer: MEDICARE

## 2020-04-15 ENCOUNTER — APPOINTMENT (OUTPATIENT)
Dept: PHYSICAL THERAPY | Age: 65
End: 2020-04-15
Payer: MEDICARE

## 2020-04-16 ENCOUNTER — TELEPHONE (OUTPATIENT)
Dept: NEUROLOGY | Facility: CLINIC | Age: 65
End: 2020-04-16

## 2020-04-22 ENCOUNTER — APPOINTMENT (OUTPATIENT)
Dept: PHYSICAL THERAPY | Age: 65
End: 2020-04-22
Payer: MEDICARE

## 2020-04-30 ENCOUNTER — TELEPHONE (OUTPATIENT)
Dept: INTERNAL MEDICINE CLINIC | Facility: CLINIC | Age: 65
End: 2020-04-30

## 2020-04-30 NOTE — TELEPHONE ENCOUNTER
Virtual/Telephone Consent    Heather Roman verbally {consents to a Virtual/Telephone Check-In service on 5/5/2020.   Patient understands and accepts financial responsibility for any deductible, co-insurance and/or co-pays associated with this servic

## 2020-05-01 ENCOUNTER — VIRTUAL PHONE E/M (OUTPATIENT)
Dept: NEUROLOGY | Facility: CLINIC | Age: 65
End: 2020-05-01
Payer: MEDICARE

## 2020-05-01 DIAGNOSIS — M47.22 OSTEOARTHRITIS OF SPINE WITH RADICULOPATHY, CERVICAL REGION: Primary | ICD-10-CM

## 2020-05-01 PROCEDURE — 99442 PHONE E/M BY PHYS 11-20 MIN: CPT | Performed by: OTHER

## 2020-05-01 NOTE — PROGRESS NOTES
Virtual/Telephone Visit Note     Date of service: 5/1/2020    Neftali Narayanan verbally consents to a Virtual/Telephone Visit service on 5/1/2020.   Patient understands and accepts financial responsibility for any deductible, co-insurance and/or co-pay Tab, Take 1,000 mg by mouth every 6 (six) hours as needed for Pain., Disp: , Rfl:   •  aspirin 325 MG Oral Tab, Take 1 tablet (325 mg total) by mouth daily. , Disp: 30 tablet, Rfl: 2  Allergies:  No Known Allergies  Past Medical History:   Diagnosis Date challenging to manage  Cervical degenerative disease with canal stenosis and multiple level foraminal stenosis/radiculopathies: worsening, not improving by PT per pt  Cervicogenic headache  (primary encounter diagnosis): stable   Occipital neuralgia, unspe

## 2020-05-05 ENCOUNTER — VIRTUAL PHONE E/M (OUTPATIENT)
Dept: INTERNAL MEDICINE CLINIC | Facility: CLINIC | Age: 65
End: 2020-05-05
Payer: MEDICARE

## 2020-05-05 DIAGNOSIS — R26.9 GAIT DISORDER: ICD-10-CM

## 2020-05-05 DIAGNOSIS — R73.02 IGT (IMPAIRED GLUCOSE TOLERANCE): Primary | ICD-10-CM

## 2020-05-05 DIAGNOSIS — E78.5 DYSLIPIDEMIA: ICD-10-CM

## 2020-05-05 DIAGNOSIS — N18.30 CKD (CHRONIC KIDNEY DISEASE), STAGE III (HCC): ICD-10-CM

## 2020-05-05 DIAGNOSIS — I10 ESSENTIAL HYPERTENSION: ICD-10-CM

## 2020-05-05 DIAGNOSIS — I25.10 CORONARY ARTERY DISEASE INVOLVING NATIVE CORONARY ARTERY OF NATIVE HEART WITHOUT ANGINA PECTORIS: ICD-10-CM

## 2020-05-05 DIAGNOSIS — G25.81 RLS (RESTLESS LEGS SYNDROME): ICD-10-CM

## 2020-05-05 PROCEDURE — 99442 PHONE E/M BY PHYS 11-20 MIN: CPT | Performed by: INTERNAL MEDICINE

## 2020-05-05 NOTE — PROGRESS NOTES
Virtual Telephone Check-In    Heather Roman verbally consents to a Virtual/Telephone Check-In visit on 05/05/20.     Patient understands and accepts financial responsibility for any deductible, co-insurance and/or co-pays associated with this servic

## 2020-05-05 NOTE — PATIENT INSTRUCTIONS
Proceed with labs that were ordered in October 2019, fast overnight, may drink water in the morning of  Call GI for colonoscopy.  The information is on mychart within the visit summary from 10/7/2019  Call my office and schedule a PE for november

## 2020-05-21 DIAGNOSIS — G25.81 RLS (RESTLESS LEGS SYNDROME): ICD-10-CM

## 2020-05-21 DIAGNOSIS — I10 ESSENTIAL HYPERTENSION: ICD-10-CM

## 2020-05-21 NOTE — TELEPHONE ENCOUNTER
Last OV relevant to medication: 5/5/20  Last refill date: 11/26/19, 10/7/19       #/refills: 270/1, 90/1  When pt was asked to return for OV: 6 months  Upcoming appt/reason: None      Please remind the patient to get labs done prior to refills being filled

## 2020-05-21 NOTE — TELEPHONE ENCOUNTER
Spoke to pt. Advised to complete fasting labs. Pt voiced understanding & [facility].

## 2020-05-23 RX ORDER — LISINOPRIL 10 MG/1
TABLET ORAL
Qty: 30 TABLET | Refills: 0 | Status: SHIPPED | OUTPATIENT
Start: 2020-05-23 | End: 2020-07-31

## 2020-05-23 RX ORDER — METOPROLOL SUCCINATE 50 MG/1
TABLET, EXTENDED RELEASE ORAL
Qty: 30 TABLET | Refills: 0 | Status: SHIPPED | OUTPATIENT
Start: 2020-05-23 | End: 2020-06-22

## 2020-05-23 RX ORDER — ROPINIROLE 0.5 MG/1
TABLET, FILM COATED ORAL
Qty: 90 TABLET | Refills: 0 | Status: SHIPPED | OUTPATIENT
Start: 2020-05-23 | End: 2020-06-22

## 2020-05-24 DIAGNOSIS — G44.201 INTRACTABLE TENSION-TYPE HEADACHE, UNSPECIFIED CHRONICITY PATTERN: Primary | ICD-10-CM

## 2020-05-26 RX ORDER — BUTALBITAL, ACETAMINOPHEN AND CAFFEINE 50; 325; 40 MG/1; MG/1; MG/1
1 TABLET ORAL EVERY 6 HOURS PRN
Qty: 12 TABLET | Refills: 0 | Status: SHIPPED | OUTPATIENT
Start: 2020-05-26 | End: 2020-07-08 | Stop reason: ALTCHOICE

## 2020-05-26 NOTE — TELEPHONE ENCOUNTER
Medication: BUTALBITAL-APAP-CAFFEINE -40 MG     Date of last refill: 3/27/20 (#12/0)  [place]    Last office visit: 5/1/20  Due back to clinic per last office note:  6 months  Date next office visit scheduled:

## 2020-06-02 ENCOUNTER — LAB ENCOUNTER (OUTPATIENT)
Dept: LAB | Age: 65
End: 2020-06-02
Attending: INTERNAL MEDICINE
Payer: MEDICARE

## 2020-06-02 DIAGNOSIS — Z00.00 ROUTINE GENERAL MEDICAL EXAMINATION AT A HEALTH CARE FACILITY: ICD-10-CM

## 2020-06-02 PROCEDURE — 80053 COMPREHEN METABOLIC PANEL: CPT

## 2020-06-02 PROCEDURE — 85025 COMPLETE CBC W/AUTO DIFF WBC: CPT

## 2020-06-02 PROCEDURE — 84443 ASSAY THYROID STIM HORMONE: CPT

## 2020-06-02 PROCEDURE — 80061 LIPID PANEL: CPT

## 2020-06-02 PROCEDURE — 83721 ASSAY OF BLOOD LIPOPROTEIN: CPT

## 2020-06-02 PROCEDURE — 36415 COLL VENOUS BLD VENIPUNCTURE: CPT

## 2020-06-02 PROCEDURE — 84439 ASSAY OF FREE THYROXINE: CPT

## 2020-06-03 NOTE — PROGRESS NOTES
Spoke to pt's cousin Wilfrid Rodarte on HIPAA. Made aware of results & recommendations. Frank reports pt \"has the worst diet ever & he does not know how to get through to him. \"  States pt's diet \"literally\" consists of double cheeseburgers from [employer]

## 2020-06-11 ENCOUNTER — OFFICE VISIT (OUTPATIENT)
Dept: SURGERY | Facility: CLINIC | Age: 65
End: 2020-06-11
Payer: MEDICARE

## 2020-06-11 VITALS
SYSTOLIC BLOOD PRESSURE: 138 MMHG | WEIGHT: 215 LBS | HEART RATE: 82 BPM | BODY MASS INDEX: 34.55 KG/M2 | HEIGHT: 66 IN | DIASTOLIC BLOOD PRESSURE: 80 MMHG

## 2020-06-11 DIAGNOSIS — M47.812 CERVICAL SPONDYLOSIS: ICD-10-CM

## 2020-06-11 DIAGNOSIS — Q28.3 CAVERNOUS MALFORMATION: ICD-10-CM

## 2020-06-11 DIAGNOSIS — G44.86 CERVICOGENIC HEADACHE: Primary | ICD-10-CM

## 2020-06-11 PROCEDURE — 99214 OFFICE O/P EST MOD 30 MIN: CPT | Performed by: PHYSICIAN ASSISTANT

## 2020-06-11 NOTE — PROGRESS NOTES
Location of Pain:  Pt states that he has issues with cervical pain. Pt states that he has issues with radiating pain in the bilateral arms with numbness and tingling . Pt states that he has no issues with weakness.  Pt states that he has no issues with B/B.

## 2020-06-11 NOTE — H&P
Neurosurgery Clinic Visit  2020    Ethel Varela PCP:  Demetra Clemente MD     MRN [id number]       CHIEF COMPLAINT:  Patient presents with:  New Patient      HISTORY OF PRESENT ILLNESS:  Ethel Varela is a(n) 62 year DAILY  30 tablet 0   • DULoxetine HCl 60 MG Oral Cap DR Particles Take 1 capsule (60 mg total) by mouth daily.  30 capsule 2   • atorvastatin 80 MG Oral Tab TAKE ONE TABLET BY MOUTH NIGHTLY 90 tablet 1   • Choline Fenofibrate (FENOFIBRIC ACID) 135 MG Oral C General: The patient is in no acute distress. HEENT: The head is atraumatic and normocephalic. The eyes, ears, nose and throat are clear. The pupils are equal, round, and reactive to light. Hearing is intact and symmetric. Neck: Reveals no masses. 10/4/2019 - stable multilevel degenerative changes in the cervical spine, most pronounced at the C5-6 level. No significant spinal canal stenosis at any level in the cervical spine.   MRI brain 1/2/2019 - no acute infarct, non-visualization of the right ve

## 2020-06-22 ENCOUNTER — OFFICE VISIT (OUTPATIENT)
Dept: NEUROLOGY | Facility: CLINIC | Age: 65
End: 2020-06-22
Payer: MEDICARE

## 2020-06-22 VITALS — DIASTOLIC BLOOD PRESSURE: 82 MMHG | RESPIRATION RATE: 16 BRPM | HEART RATE: 70 BPM | SYSTOLIC BLOOD PRESSURE: 138 MMHG

## 2020-06-22 DIAGNOSIS — M54.2 NECK PAIN: ICD-10-CM

## 2020-06-22 DIAGNOSIS — M50.30 DEGENERATIVE DISC DISEASE, CERVICAL: Primary | ICD-10-CM

## 2020-06-22 PROCEDURE — 99215 OFFICE O/P EST HI 40 MIN: CPT | Performed by: OTHER

## 2020-06-22 RX ORDER — DIVALPROEX SODIUM 500 MG/1
500 TABLET, DELAYED RELEASE ORAL 2 TIMES DAILY
Qty: 60 TABLET | Refills: 1 | Status: SHIPPED | OUTPATIENT
Start: 2020-06-22 | End: 2020-07-01

## 2020-06-22 NOTE — PROGRESS NOTES
[facility] Neurology outpatient progress note  Date of service: 6/22/2020    Patient here for a follow-up visit for headache, neck pain. Overall his symptoms have not improved, he is self taking some OTC pain meds for headache.    Brief history:  Kavya Boles 135 MG Oral Capsule Delayed Release, Take 1 capsule by mouth once daily. , Disp: 90 capsule, Rfl: 1  •  Meclizine HCl 25 MG Oral Tab, Take 1 tablet (25 mg total) by mouth 3 (three) times daily as needed for Dizziness. , Disp: 20 tablet, Rfl: 1  Allergies:  N all extremities  Tone: normal  DTRs: + symmetric  Plantar response: bilateral flexor  Coordination: Normal FTN  Sensory: symmetric   Gait: uses cane for precaution  Romberg: deferred  Neck: supple      Test reviewed on 6/22/2020    A/P:   Overall complicat

## 2020-06-22 NOTE — PROGRESS NOTES
Patient here to follow up regarding headaches. States they have not been controlled. Here to discuss the next steps.

## 2020-06-25 ENCOUNTER — TELEPHONE (OUTPATIENT)
Dept: SURGERY | Facility: CLINIC | Age: 65
End: 2020-06-25

## 2020-06-25 NOTE — TELEPHONE ENCOUNTER
Pt's cousin Yang Rowe (ok per HIPAA) calling stating pt no longer wants to see Dr. Sandra Bernstein but would prefer to see Dr. Gail Hicks, please advise if providers agree; cousin awaiting call back w/determination

## 2020-06-26 NOTE — TELEPHONE ENCOUNTER
LM advising that previous POC designated by Dr Luba Danielson was never completed, both doctors perform the same procedures, they are partners and generally make the same recommendations, and we do try to have pts maintain same provider if possible.  Further advised

## 2020-07-01 ENCOUNTER — TELEPHONE (OUTPATIENT)
Dept: NEUROLOGY | Facility: CLINIC | Age: 65
End: 2020-07-01

## 2020-07-01 DIAGNOSIS — G44.86 CERVICOGENIC HEADACHE: Primary | ICD-10-CM

## 2020-07-01 NOTE — TELEPHONE ENCOUNTER
S: Dizziness and visual disturbance    B: Started on Depakote for migraines on 6/22/20.  LOV 6/22/20    Pain management to follow  Cont PT for neck pain  Cont cymbalta, fioricet   Cont  mg, lipitor 80 mg  Ok to cont ropinirole  depakote 500 mg bid or

## 2020-07-01 NOTE — TELEPHONE ENCOUNTER
Patient's cousin notified to have patient stop Depakote and Dr Óscar Decker recommends having patient seen at [facility] setting or [facility] (advised he check with insurance for coverage. Informed referral has been completed.  Also, informed Dr Kidd Self

## 2020-07-01 NOTE — TELEPHONE ENCOUNTER
I recommend stop depakote if it is causing adverse effect. unfortunately I could not think of other meds at this time since he has tried many and failed.  Perhaps it is time to seek for 2nd opinion (in [place] or [facility] headache clinic or local D

## 2020-07-08 ENCOUNTER — TELEPHONE (OUTPATIENT)
Dept: PAIN CLINIC | Facility: CLINIC | Age: 65
End: 2020-07-08

## 2020-07-08 ENCOUNTER — OFFICE VISIT (OUTPATIENT)
Dept: PAIN CLINIC | Facility: CLINIC | Age: 65
End: 2020-07-08
Payer: MEDICARE

## 2020-07-08 VITALS — BODY MASS INDEX: 31.39 KG/M2 | HEIGHT: 67 IN | WEIGHT: 200 LBS

## 2020-07-08 DIAGNOSIS — G44.86 CERVICOGENIC HEADACHE: Primary | ICD-10-CM

## 2020-07-08 DIAGNOSIS — M50.30 DDD (DEGENERATIVE DISC DISEASE), CERVICAL: ICD-10-CM

## 2020-07-08 PROCEDURE — 99214 OFFICE O/P EST MOD 30 MIN: CPT | Performed by: ANESTHESIOLOGY

## 2020-07-08 RX ORDER — TAMSULOSIN HYDROCHLORIDE 0.4 MG/1
CAPSULE ORAL DAILY
Status: ON HOLD | COMMUNITY
End: 2020-11-23 | Stop reason: CLARIF

## 2020-07-08 NOTE — PROGRESS NOTES
Patient presents in office today with reported pain in middle of the head and top of head    Current pain level reported = 9/10

## 2020-07-08 NOTE — TELEPHONE ENCOUNTER
Completed [employer] Form for Right Cervical FACET ([id number]) Attached Clinical Information faxed to Noemi Bee.

## 2020-07-08 NOTE — PROGRESS NOTES
Spoke to patient and his daughter regarding recommended left facet injection  procedure and reviewed all relevant pre-procedure instructions.   Patient and daughter understands procedure will be scheduled upon receipt of insurance approval. Would like to sc

## 2020-07-08 NOTE — PROGRESS NOTES
Name: Romana Lites   :    DOS: 2020     Pain Clinic Follow Up Visit:   Patient presents with:  [facility] F/U      Romana Lites is a 62year old male with a history of cervical degenerative disc disease, neck pain, an Limited lateral rotation. Facet loading positive. Back: Gait intact    IMAGES:     Cervical MRI reviewed with evidence of multilevel degenerative changes. There is neuroforaminal stenosis most significant at C5-6. Facet arthropathy throughout.     ASSES

## 2020-07-08 NOTE — TELEPHONE ENCOUNTER
*Pt prefers to schedule late July/early August. Call Frank LEONE to schedule. Medical clearance needed- No    Implanted cardiac device/SCS/PNS: NA    Pt seen in OV today by Dr. Julius Zelaya and recommended for Facet (X 2).   Please begin PA process for procedu

## 2020-07-09 ENCOUNTER — TELEPHONE (OUTPATIENT)
Dept: PAIN CLINIC | Facility: CLINIC | Age: 65
End: 2020-07-09

## 2020-07-10 NOTE — TELEPHONE ENCOUNTER
Prior authorization request completed for: Right Cervical FACET   Authorization #[id number]    Authorization dates: 07/08/20 - 08/22/20  CPT codes approved: 93485,33397,62785  Number of visits/dates of service approved: 1  Physician: Darnell Santana

## 2020-07-14 RX ORDER — DIAZEPAM 10 MG/1
10 TABLET ORAL EVERY 12 HOURS PRN
Qty: 2 TABLET | Refills: 0 | Status: SHIPPED | OUTPATIENT
Start: 2020-07-14 | End: 2020-09-25 | Stop reason: ALTCHOICE

## 2020-07-14 NOTE — TELEPHONE ENCOUNTER
Spoke to patients son Jonathan Lozoya, to advise on insurance approval and is agreeable to scheduling. Patient scheduled for a pain management procedure, pre-procedure instructions reviewed. Advised of new process for \"virtual check-in\".  Advised to c ? Check in at [facility] ([address] [facility] [address] Stewart) outpatient registration in the [facility]. ? Please note-No prescriptions will be written by Pain Clinic in [place] on the day of procedure.  If you require a refill of medications, please c Most insurances are now requiring a preauthorization for all procedures. In the event that your insurance does not authorize your procedure within 48 hours of the scheduled date, your procedure will be cancelled and rescheduled to a later date.   Please c

## 2020-07-28 DIAGNOSIS — M54.2 NECK PAIN: Primary | ICD-10-CM

## 2020-07-28 NOTE — TELEPHONE ENCOUNTER
Spoke with Tammy Michaelsgoyo, pts son, regarding prescription question. Layman Nba, the order for Valium (diazepam) was sent to [place] in [address] [facility] (on [facility]) on 7/14/2020 by Dr. Harvey Damian.  Tammy Sarabia stated his wife is trying \"to get this figured out with the pharmacy

## 2020-07-29 RX ORDER — DULOXETIN HYDROCHLORIDE 60 MG/1
CAPSULE, DELAYED RELEASE ORAL
Qty: 30 CAPSULE | Refills: 2 | Status: SHIPPED | OUTPATIENT
Start: 2020-07-29 | End: 2020-11-20

## 2020-07-29 NOTE — TELEPHONE ENCOUNTER
Medication: DULOXETINE HCL 60 MG Oral Cap DR Bessie    Date of last refill: 11/25/19 (#30/2)  [place]    Last office visit: 6/22/2020  Due back to clinic per last office note:  Around 09/22/2020  Date next office

## 2020-07-30 ENCOUNTER — HOSPITAL ENCOUNTER (OUTPATIENT)
Facility: HOSPITAL | Age: 65
Setting detail: HOSPITAL OUTPATIENT SURGERY
Discharge: HOME | End: 2020-07-30
Attending: ANESTHESIOLOGY | Admitting: ANESTHESIOLOGY
Payer: MEDICARE

## 2020-07-30 ENCOUNTER — APPOINTMENT (OUTPATIENT)
Dept: GENERAL RADIOLOGY | Facility: HOSPITAL | Age: 65
End: 2020-07-30
Attending: ANESTHESIOLOGY
Payer: MEDICARE

## 2020-07-30 VITALS
DIASTOLIC BLOOD PRESSURE: 79 MMHG | HEART RATE: 68 BPM | SYSTOLIC BLOOD PRESSURE: 123 MMHG | TEMPERATURE: 98 F | RESPIRATION RATE: 18 BRPM | OXYGEN SATURATION: 98 %

## 2020-07-30 DIAGNOSIS — G44.86 CERVICOGENIC HEADACHE: ICD-10-CM

## 2020-07-30 DIAGNOSIS — M50.30 DDD (DEGENERATIVE DISC DISEASE), CERVICAL: ICD-10-CM

## 2020-07-30 PROCEDURE — 3E0U3BZ INTRODUCTION OF ANESTHETIC AGENT INTO JOINTS, PERCUTANEOUS APPROACH: ICD-10-PCS | Performed by: ANESTHESIOLOGY

## 2020-07-30 PROCEDURE — 3E0U33Z INTRODUCTION OF ANTI-INFLAMMATORY INTO JOINTS, PERCUTANEOUS APPROACH: ICD-10-PCS | Performed by: ANESTHESIOLOGY

## 2020-07-30 RX ORDER — DIPHENHYDRAMINE HYDROCHLORIDE 50 MG/ML
50 INJECTION INTRAMUSCULAR; INTRAVENOUS ONCE AS NEEDED
Status: DISCONTINUED | OUTPATIENT
Start: 2020-07-30 | End: 2020-07-30

## 2020-07-30 RX ORDER — LIDOCAINE HYDROCHLORIDE 10 MG/ML
INJECTION, SOLUTION EPIDURAL; INFILTRATION; INTRACAUDAL; PERINEURAL AS NEEDED
Status: DISCONTINUED | OUTPATIENT
Start: 2020-07-30 | End: 2020-07-30

## 2020-07-30 RX ORDER — 0.9 % SODIUM CHLORIDE 0.9 %
VIAL (ML) INJECTION AS NEEDED
Status: DISCONTINUED | OUTPATIENT
Start: 2020-07-30 | End: 2020-07-30

## 2020-07-30 RX ORDER — DEXAMETHASONE SODIUM PHOSPHATE 10 MG/ML
INJECTION, SOLUTION INTRAMUSCULAR; INTRAVENOUS AS NEEDED
Status: DISCONTINUED | OUTPATIENT
Start: 2020-07-30 | End: 2020-07-30

## 2020-07-30 RX ORDER — ONDANSETRON 2 MG/ML
4 INJECTION INTRAMUSCULAR; INTRAVENOUS ONCE AS NEEDED
Status: DISCONTINUED | OUTPATIENT
Start: 2020-07-30 | End: 2020-07-30

## 2020-07-30 NOTE — OPERATIVE REPORT
[facility]  Operative Report  2020     Romana Lites Patient Status:  Hospital Outpatient Surgery    1955 MRN [id number]   [facility] ENDOSCOPY Attending Ernesto Rodriguez MD   Hosp Day # 0 PCP Veronica Simon MD were performed at C5, C6 and C7 levels. The needles were removed with tips intact. The patient tolerated the procedure very well. No complications were encountered during or immediately after the procedure.   The patient was observed until discharge crit

## 2020-07-30 NOTE — H&P
History & Physical Examination    Patient Name: Barbi Rick  MRN: [id number]  CSN: [id number]  YOB: 1955    Pre-Operative Diagnosis:  DDD (degenerative disc disease), cervical [M50.30]  Cervicogenic headache [R51]    Present Illness: • RLS (restless legs syndrome) 12/3/2015   • Stroke ([address]     8/2017     Past Surgical History:   Procedure Laterality Date   • ANGIOGRAM     • BIOPSY OF PROSTATE,NEEDLE/PUNCH  2009    benign   • COLOSTOMY      had reversal   • KNEE REPLACEMENT SURGERY

## 2020-07-31 DIAGNOSIS — I10 ESSENTIAL HYPERTENSION: ICD-10-CM

## 2020-07-31 RX ORDER — LISINOPRIL 10 MG/1
TABLET ORAL
Qty: 30 TABLET | Refills: 3 | Status: ON HOLD | OUTPATIENT
Start: 2020-07-31 | End: 2020-11-23 | Stop reason: CLARIF

## 2020-07-31 NOTE — TELEPHONE ENCOUNTER
Patients cousin Betty Vargas was calling to schedule second injection. Patient had the first injection yesterday. Munir Marie the insurance requires an update from the patient in 2 weeks before authorization of the second injection can take place.  Frank mixon

## 2020-08-17 NOTE — TELEPHONE ENCOUNTER
Please contact patient for follow up to 07/30 Right Cervical FACET procedure:     Please document % relief from procedure, change in functional status, and/or change in medications since procedure.

## 2020-08-24 NOTE — TELEPHONE ENCOUNTER
Spoke to pt's EC who states pt reported no relief following injection. Pt has been using gummies to help with pain. Pt has been complaining about pain the same way, the same amount since the injection as he was prior to the injection.  Pt states it was the

## 2020-08-24 NOTE — TELEPHONE ENCOUNTER
Teo Webster MD  You 1 hour ago (10:56 AM)      The injection was diagnostic only. It would have lasted just a few hours which was reinforced at time of injection.  He needs to let us know if he had any for the 1-2 hours relief immediately following the pro

## 2020-08-25 NOTE — TELEPHONE ENCOUNTER
Spoke to pt's Daja Purvis, to try to gain more info pertaining to the hour or two following injection. Constance George states it is difficult to determine because the pt was complaining of pain because of the injection itself.  Pt may not be able to differentiate b

## 2020-09-02 ENCOUNTER — TELEPHONE (OUTPATIENT)
Dept: SURGERY | Facility: CLINIC | Age: 65
End: 2020-09-02

## 2020-09-02 NOTE — TELEPHONE ENCOUNTER
Patient's cousin Jefry Piper called back     States patient experienced Pain % relief- 20 % relief for 2 days after Right Cervical Facet inj 7/30/20    Functional status improvement- Per antoninasin Jefry Piper not much.  Patient walking but loosing balance due to [employer]

## 2020-09-03 NOTE — TELEPHONE ENCOUNTER
Completed [employer] Form for Left C4-7 FACET ([id number]) x1 , Attached Clinical Information faxed to [phone];confirmation received.

## 2020-09-04 NOTE — TELEPHONE ENCOUNTER
Prior authorization request completed for: Left C4-7 FACET   Authorization #[id number]    Authorization dates: 09/03/20 - 10/18/20  CPT codes approved: 85895,90982,03280  Number of visits/dates of service approved: 1  Physician: Nadya Arrington     Patient can

## 2020-09-04 NOTE — TELEPHONE ENCOUNTER
Spoke to pt's ECEscobar to schedule left cervical facet joint injections. Placed on schedule for 9/15, case time 1015. Vanna Stanley states that pt did not tolerate procedure very well the last time and is requesting that pt rcv concsious sedation.  Ashley Mcgregor

## 2020-09-08 NOTE — TELEPHONE ENCOUNTER
Ranjith Mae MD  You 4 days ago      I agree he did not tolerate the procedure well.  Would be ok with sedation for him      Spoke to [place] to advise that CS is ok'd, Covid test order has been placed w/ instructions to contact [place], provided phone numb

## 2020-09-12 ENCOUNTER — APPOINTMENT (OUTPATIENT)
Dept: LAB | Facility: HOSPITAL | Age: 65
End: 2020-09-12
Attending: ANESTHESIOLOGY
Payer: MEDICARE

## 2020-09-12 DIAGNOSIS — G44.86 CERVICOGENIC HEADACHE: ICD-10-CM

## 2020-09-13 LAB — SARS-COV-2 RNA RESP QL NAA+PROBE: NOT DETECTED

## 2020-09-15 ENCOUNTER — APPOINTMENT (OUTPATIENT)
Dept: GENERAL RADIOLOGY | Facility: HOSPITAL | Age: 65
End: 2020-09-15
Attending: ANESTHESIOLOGY
Payer: MEDICARE

## 2020-09-15 ENCOUNTER — HOSPITAL ENCOUNTER (OUTPATIENT)
Facility: HOSPITAL | Age: 65
Setting detail: HOSPITAL OUTPATIENT SURGERY
Discharge: HOME | End: 2020-09-15
Attending: ANESTHESIOLOGY | Admitting: ANESTHESIOLOGY
Payer: MEDICARE

## 2020-09-15 VITALS
DIASTOLIC BLOOD PRESSURE: 70 MMHG | SYSTOLIC BLOOD PRESSURE: 105 MMHG | RESPIRATION RATE: 18 BRPM | OXYGEN SATURATION: 95 % | HEART RATE: 85 BPM | TEMPERATURE: 98 F

## 2020-09-15 DIAGNOSIS — M50.30 DDD (DEGENERATIVE DISC DISEASE), CERVICAL: ICD-10-CM

## 2020-09-15 DIAGNOSIS — G44.86 CERVICOGENIC HEADACHE: ICD-10-CM

## 2020-09-15 PROCEDURE — 99152 MOD SED SAME PHYS/QHP 5/>YRS: CPT | Performed by: ANESTHESIOLOGY

## 2020-09-15 PROCEDURE — 3E0U3BZ INTRODUCTION OF ANESTHETIC AGENT INTO JOINTS, PERCUTANEOUS APPROACH: ICD-10-PCS | Performed by: ANESTHESIOLOGY

## 2020-09-15 PROCEDURE — 3E0U33Z INTRODUCTION OF ANTI-INFLAMMATORY INTO JOINTS, PERCUTANEOUS APPROACH: ICD-10-PCS | Performed by: ANESTHESIOLOGY

## 2020-09-15 RX ORDER — SODIUM CHLORIDE, SODIUM LACTATE, POTASSIUM CHLORIDE, CALCIUM CHLORIDE 600; 310; 30; 20 MG/100ML; MG/100ML; MG/100ML; MG/100ML
100 INJECTION, SOLUTION INTRAVENOUS CONTINUOUS
Status: DISCONTINUED | OUTPATIENT
Start: 2020-09-15 | End: 2020-09-15

## 2020-09-15 RX ORDER — DIPHENHYDRAMINE HYDROCHLORIDE 50 MG/ML
50 INJECTION INTRAMUSCULAR; INTRAVENOUS ONCE AS NEEDED
Status: DISCONTINUED | OUTPATIENT
Start: 2020-09-15 | End: 2020-09-15

## 2020-09-15 RX ORDER — 0.9 % SODIUM CHLORIDE 0.9 %
VIAL (ML) INJECTION AS NEEDED
Status: DISCONTINUED | OUTPATIENT
Start: 2020-09-15 | End: 2020-09-15

## 2020-09-15 RX ORDER — LIDOCAINE HYDROCHLORIDE 10 MG/ML
INJECTION, SOLUTION EPIDURAL; INFILTRATION; INTRACAUDAL; PERINEURAL AS NEEDED
Status: DISCONTINUED | OUTPATIENT
Start: 2020-09-15 | End: 2020-09-15

## 2020-09-15 RX ORDER — ONDANSETRON 2 MG/ML
4 INJECTION INTRAMUSCULAR; INTRAVENOUS ONCE AS NEEDED
Status: DISCONTINUED | OUTPATIENT
Start: 2020-09-15 | End: 2020-09-15

## 2020-09-15 RX ORDER — MIDAZOLAM HYDROCHLORIDE 1 MG/ML
INJECTION INTRAMUSCULAR; INTRAVENOUS AS NEEDED
Status: DISCONTINUED | OUTPATIENT
Start: 2020-09-15 | End: 2020-09-15

## 2020-09-15 RX ORDER — ASPIRIN 81 MG/1
81 TABLET ORAL DAILY
Status: ON HOLD | COMMUNITY
End: 2020-11-24

## 2020-09-15 RX ORDER — DEXAMETHASONE SODIUM PHOSPHATE 10 MG/ML
INJECTION, SOLUTION INTRAMUSCULAR; INTRAVENOUS AS NEEDED
Status: DISCONTINUED | OUTPATIENT
Start: 2020-09-15 | End: 2020-09-15

## 2020-09-15 NOTE — H&P
History & Physical Examination    Patient Name: Marcela Ziegler  MRN: [id number]  [facility]: [id number]  YOB: 1955    Pre-Operative Diagnosis:  Cervicogenic headache [R51]  DDD (degenerative disc disease), cervical [M50.30]    Present Illness: Other and unspecified hyperlipidemia    • RLS (restless legs syndrome) 12/3/2015   • Stroke [facility])     8/2017     Past Surgical History:   Procedure Laterality Date   • ANGIOGRAM     • BIOPSY OF PROSTATE,NEEDLE/PUNCH  2009    benign   • CERVICAL FACET [facility]

## 2020-09-15 NOTE — OPERATIVE REPORT
[facility]  Operative Report  9/15/2020     Marcela Ziegler Patient Status:  Hospital Outpatient Surgery    1955 MRN [id number]   [facility] ENDOSCOPY Attending Brady Stevens MD   Hosp Day # 0 PCP Tomy Garcia MD the mid portion of the corresponding articular pillar at C4. The needle position was confirmed in AP and lateral views. Following negative aspiration for CSF and blood, 0.5 mL 1% lidocaine and 2.5 mg of DecADRON were injected.   Similar procedures were pe

## 2020-09-22 ENCOUNTER — TELEPHONE (OUTPATIENT)
Dept: SURGERY | Facility: CLINIC | Age: 65
End: 2020-09-22

## 2020-09-22 NOTE — TELEPHONE ENCOUNTER
Spoke with Patient's cousin Nora Bajwa.  Nora Bajwa stated patient now complaints of a new kind of pain after injections on 9/15/20     Noted patient completed LEFT CERVICAL FACET INJECTIONS  X 4 LEVELS WITH CONSCIOUS SEDATION 9/15/20       Escobar morales

## 2020-09-22 NOTE — TELEPHONE ENCOUNTER
Patient's cousin would like to speak with nurse regarding pain. Headaches were gone for a week but now, yesterday, pt states it feels a pain above ear on left side to middle.

## 2020-09-23 NOTE — TELEPHONE ENCOUNTER
Josiane Pena MD  You 25 minutes ago (8:24 AM)     Headache not related to injection. Please see neurology for headache work up.  Can follow up in office to discuss RFA    Message text

## 2020-09-23 NOTE — TELEPHONE ENCOUNTER
Spoke with patient's cousin Blanca Calix (ok per HIPPA) and relayed recommendations per Dr Nohelia Menchaca below . Patient scheduled to follow up with Dr Nohelia Menchaca 9/25/20 to discuss RFA . Blanca Calix stated he will contact Neurology Dr Cali Rodriguez office after visit with Dr Nohelia Menchaca.  No

## 2020-09-25 ENCOUNTER — TELEPHONE (OUTPATIENT)
Dept: PAIN CLINIC | Facility: CLINIC | Age: 65
End: 2020-09-25

## 2020-09-25 ENCOUNTER — OFFICE VISIT (OUTPATIENT)
Dept: PAIN CLINIC | Facility: CLINIC | Age: 65
End: 2020-09-25
Payer: MEDICARE

## 2020-09-25 VITALS
SYSTOLIC BLOOD PRESSURE: 110 MMHG | WEIGHT: 200 LBS | HEART RATE: 80 BPM | BODY MASS INDEX: 31 KG/M2 | DIASTOLIC BLOOD PRESSURE: 80 MMHG

## 2020-09-25 DIAGNOSIS — G44.86 CERVICOGENIC HEADACHE: Primary | ICD-10-CM

## 2020-09-25 DIAGNOSIS — M50.30 DDD (DEGENERATIVE DISC DISEASE), CERVICAL: Primary | ICD-10-CM

## 2020-09-25 PROCEDURE — 3074F SYST BP LT 130 MM HG: CPT | Performed by: ANESTHESIOLOGY

## 2020-09-25 PROCEDURE — 3079F DIAST BP 80-89 MM HG: CPT | Performed by: ANESTHESIOLOGY

## 2020-09-25 PROCEDURE — 99214 OFFICE O/P EST MOD 30 MIN: CPT | Performed by: ANESTHESIOLOGY

## 2020-09-25 RX ORDER — GABAPENTIN 100 MG/1
100 CAPSULE ORAL NIGHTLY
Qty: 90 CAPSULE | Refills: 0 | Status: SHIPPED | OUTPATIENT
Start: 2020-09-25 | End: 2020-12-21

## 2020-09-25 NOTE — PROGRESS NOTES
Name: Cleveland Griffith   :    DOS: 2020     Pain Clinic Follow Up Visit:   Patient presents with:   Follow - Up: Post Injections       Cleveland Griffith is a 60year old male with significant cervical degenerative disc disease and affect, concentration & attention span intact. Inspection:  Ambulates with well-coordinated, fluid, non-antalgic gait.   Gait is normal.  Neck: Limited lateral rotation  Back: Gait intact      IMAGES:   No new imaging      ASSESSMENT AND PLAN:   Scot Vanegas

## 2020-09-25 NOTE — PROGRESS NOTES
Patient here to discuss RFA with Dr Angelika Dempsey . Patient reported 90 % pain relief for 7 days after LEFT CERVICAL FACET INJECTIONS  X 4 LEVELS WITH CONSCIOUS SEDATION 9/15/20.

## 2020-09-25 NOTE — TELEPHONE ENCOUNTER
Medical clearance needed- No    Implanted cardiac device/SCS/PNS: NA    Pt seen in OV today by NA and recommended for RFA (X 1). Please begin PA process for procedure(s).      Laterality: Left  Level(s): C4-7    Pt informed callback will be given when PA f

## 2020-09-29 NOTE — TELEPHONE ENCOUNTER
Completed OrthoNet Form for Left C4-7 RFA ([id number]) x1 Attached Clinical Information faxed to [phone];confirmation received.

## 2020-10-08 ENCOUNTER — TELEPHONE (OUTPATIENT)
Dept: CASE MANAGEMENT | Age: 65
End: 2020-10-08

## 2020-10-08 NOTE — TELEPHONE ENCOUNTER
Patients cousin Demian Dey on HIPPA informed patient is  eligible for 2020 Medicare Advantage Supervisit, scheduled for 12/3/2020.

## 2020-10-12 NOTE — TELEPHONE ENCOUNTER
Lorenza Gregory MD  You 7 minutes ago (9:42 AM)     Please schedule for repeat left cervical facet with sedation     Message text      Spoke to pt's Brook Arroyo, to advise that repeat left MBB is required prior to RFA. Escobar MACHADO.  Advised that call back will

## 2020-10-12 NOTE — TELEPHONE ENCOUNTER
Received Denial Letter from [address] [place] [address] -See Below     Left C4-7 RFA ([id number]) Denied     Denial Reason:    Your doctor would like to do an injection (radiofrequency) for your pain. Your records indicate that you had one injection on 9/15/20.  We need the

## 2020-10-12 NOTE — TELEPHONE ENCOUNTER
Medical clearance needed- No    Implanted cardiac device/SCS/PNS: N/A    Pt evaluated by Dr Estephanie Mccracken and recommended for left cervical medial branch block (X 1). Please begin PA process for procedure(s).      Laterality: Left  Level(s): C4-7    Pt informed fran

## 2020-10-12 NOTE — TELEPHONE ENCOUNTER
Carmelita Murphy Pain Nurse 25 minutes ago (9:14 AM)     Patient will need to complete 2 MBB or FACET for Cervical Spine and Same Body Side prior to RFA, patient has already completed 1 Left Cervical FACET.      Please advise     Routing comment

## 2020-10-14 NOTE — TELEPHONE ENCOUNTER
Completed OrthoNet Form for Left C4-7 MBB ([id number]) Attached Clinical Information faxed to [address] received.

## 2020-10-16 NOTE — TELEPHONE ENCOUNTER
Spoke with pts EC, Julio Valentine, advised of insurance approval to proceed with injections and is agreeable to scheduling. Patient scheduled for procedure, pre-procedure instructions reviewed. Patient prefers conscious sedation.  Reviewed sedation instructions in ? If you take morning blood pressure medication or oral diabetic medication, please take with a small sip of water. ? You are required to have a responsible adult drive you home after your procedure.  You may not take a cab unless you have a responsible ad Meloxicam -- Cervical procedures require 3 days    Ibuprofen (Motrin, Advil, Vicoprofen), Naproxen (Naprosyn, Aleve), Piroxcam (Feldene), Meloxicam (Mobic)--(Cervical procedures will require 3 days), Oxaprozin (Daypro), Diclofenac (Voltaren), Indomethacin

## 2020-10-16 NOTE — TELEPHONE ENCOUNTER
Prior authorization request completed for: 2nd Left C4-7 MBB   Authorization #[id number]    Authorization dates: 10/14/20 - 11/28/20  CPT codes approved: 01148,83485,64729  Number of visits/dates of service approved: 1  Physician: Angelika Dempsey     Patient ca

## 2020-10-19 ENCOUNTER — APPOINTMENT (OUTPATIENT)
Dept: LAB | Facility: HOSPITAL | Age: 65
End: 2020-10-19
Attending: ANESTHESIOLOGY
Payer: MEDICARE

## 2020-10-19 DIAGNOSIS — M50.30 DDD (DEGENERATIVE DISC DISEASE), CERVICAL: ICD-10-CM

## 2020-10-22 ENCOUNTER — HOSPITAL ENCOUNTER (OUTPATIENT)
Facility: HOSPITAL | Age: 65
Setting detail: HOSPITAL OUTPATIENT SURGERY
Discharge: HOME | End: 2020-10-22
Attending: ANESTHESIOLOGY | Admitting: ANESTHESIOLOGY
Payer: MEDICARE

## 2020-10-22 ENCOUNTER — APPOINTMENT (OUTPATIENT)
Dept: GENERAL RADIOLOGY | Facility: HOSPITAL | Age: 65
End: 2020-10-22
Attending: ANESTHESIOLOGY
Payer: MEDICARE

## 2020-10-22 VITALS
TEMPERATURE: 98 F | DIASTOLIC BLOOD PRESSURE: 83 MMHG | HEART RATE: 88 BPM | RESPIRATION RATE: 18 BRPM | SYSTOLIC BLOOD PRESSURE: 116 MMHG | OXYGEN SATURATION: 96 %

## 2020-10-22 DIAGNOSIS — M50.30 DDD (DEGENERATIVE DISC DISEASE), CERVICAL: ICD-10-CM

## 2020-10-22 PROCEDURE — 3E0T33Z INTRODUCTION OF ANTI-INFLAMMATORY INTO PERIPHERAL NERVES AND PLEXI, PERCUTANEOUS APPROACH: ICD-10-PCS | Performed by: ANESTHESIOLOGY

## 2020-10-22 PROCEDURE — 3E0T3BZ INTRODUCTION OF ANESTHETIC AGENT INTO PERIPHERAL NERVES AND PLEXI, PERCUTANEOUS APPROACH: ICD-10-PCS | Performed by: ANESTHESIOLOGY

## 2020-10-22 PROCEDURE — BR141ZZ FLUOROSCOPY OF CERVICAL FACET JOINT(S) USING LOW OSMOLAR CONTRAST: ICD-10-PCS | Performed by: ANESTHESIOLOGY

## 2020-10-22 RX ORDER — ONDANSETRON 2 MG/ML
4 INJECTION INTRAMUSCULAR; INTRAVENOUS ONCE AS NEEDED
Status: DISCONTINUED | OUTPATIENT
Start: 2020-10-22 | End: 2020-10-22

## 2020-10-22 RX ORDER — DIPHENHYDRAMINE HYDROCHLORIDE 50 MG/ML
50 INJECTION INTRAMUSCULAR; INTRAVENOUS ONCE AS NEEDED
Status: DISCONTINUED | OUTPATIENT
Start: 2020-10-22 | End: 2020-10-22

## 2020-10-22 RX ORDER — SODIUM CHLORIDE 9 MG/ML
INJECTION INTRAVENOUS AS NEEDED
Status: DISCONTINUED | OUTPATIENT
Start: 2020-10-22 | End: 2020-10-22

## 2020-10-22 RX ORDER — LIDOCAINE HYDROCHLORIDE 10 MG/ML
INJECTION, SOLUTION EPIDURAL; INFILTRATION; INTRACAUDAL; PERINEURAL AS NEEDED
Status: DISCONTINUED | OUTPATIENT
Start: 2020-10-22 | End: 2020-10-22

## 2020-10-22 RX ORDER — MIDAZOLAM HYDROCHLORIDE 1 MG/ML
INJECTION INTRAMUSCULAR; INTRAVENOUS AS NEEDED
Status: DISCONTINUED | OUTPATIENT
Start: 2020-10-22 | End: 2020-10-22

## 2020-10-22 RX ORDER — SODIUM CHLORIDE, SODIUM LACTATE, POTASSIUM CHLORIDE, CALCIUM CHLORIDE 600; 310; 30; 20 MG/100ML; MG/100ML; MG/100ML; MG/100ML
100 INJECTION, SOLUTION INTRAVENOUS CONTINUOUS
Status: DISCONTINUED | OUTPATIENT
Start: 2020-10-22 | End: 2020-10-22

## 2020-10-22 RX ORDER — DEXAMETHASONE SODIUM PHOSPHATE 10 MG/ML
INJECTION, SOLUTION INTRAMUSCULAR; INTRAVENOUS AS NEEDED
Status: DISCONTINUED | OUTPATIENT
Start: 2020-10-22 | End: 2020-10-22

## 2020-10-22 NOTE — H&P
History & Physical Examination    Patient Name: Minal Ramirez  MRN: [id number]  CSN: [id number]  YOB: 1955    Pre-Operative Diagnosis:  DDD (degenerative disc disease), cervical [M50.30]    Present Illness: Patient with cervical degene • HTN (hypertension)    • IGT (impaired glucose tolerance) 6/9/2015   • AMALIA on CPAP 4/30/2015   • Other and unspecified hyperlipidemia    • RLS (restless legs syndrome) 12/3/2015   • Stroke ([facility]     8/2017     Past Surgical History:   Procedure Lateral

## 2020-10-22 NOTE — OPERATIVE REPORT
[facility]  Operative Report  10/22/2020     Eufemia Gaytan Patient Status:  Hospital Outpatient Surgery    1955 MRN [id number]   [facility] ENDOSCOPY Attending Tanisha Spears MD   Hosp Day # 0 PCP Geoffery Blizzard, MD C4.  The needle position was confirmed in AP and lateral views. Following negative aspiration for CSF and blood, 0.5 mL 1% lidocaine and 2.5 mg of Decadron were injected. Similar procedures were performed at C5, C6 and C7 levels.   The needles were remove

## 2020-10-28 ENCOUNTER — TELEPHONE (OUTPATIENT)
Dept: SURGERY | Facility: CLINIC | Age: 65
End: 2020-10-28

## 2020-10-28 NOTE — TELEPHONE ENCOUNTER
[place]  11:20 AM                Pt's cousin, Yosvany Sargent, calling to provide status update after recent procedure.  Pt's cousin states that \"it worked about the same as last time\" and that after a few days the relief seemed to ware off.      Pt's

## 2020-10-28 NOTE — TELEPHONE ENCOUNTER
Please contact patient for follow up to 10/22 Left Cervical MBB  procedure:     Please document % relief from procedure, change in functional status, and/or change in medications since procedure.

## 2020-10-28 NOTE — TELEPHONE ENCOUNTER
Completed OrthoNet Form for Left C4-7 RFA ([id number]) Attached Clinical Information faxed to [phone];confirmation received.

## 2020-10-28 NOTE — TELEPHONE ENCOUNTER
Pt's cousin, Monreal Purnima, calling to provide status update after recent procedure. Pt's cousin states that \"it worked about the same as last time\" and that after a few days the relief seemed to vega off.      Pt's cousin states that they would like to get the

## 2020-10-28 NOTE — TELEPHONE ENCOUNTER
Spoke to pt's Veronica Elliott, who states pt obtained 80% relief for 2-3 days and then pain started to return. Migraine headache was alleviated.  Blanca Calix states pt's motor skills are impeded by previous stroke, therefore it is difficult to gauge if his ability

## 2020-10-30 NOTE — TELEPHONE ENCOUNTER
Received call from Jocelyne Riddle at [facility] regarding [address] for Left C4-7 RFA     Per Jocelyne Riddle since request was denied in September the next option would be to submit Appeal     [facility] reason for Left RFA initially was due to patient not hav

## 2020-11-12 ENCOUNTER — PATIENT MESSAGE (OUTPATIENT)
Dept: PAIN CLINIC | Facility: CLINIC | Age: 65
End: 2020-11-12

## 2020-11-12 ENCOUNTER — TELEPHONE (OUTPATIENT)
Dept: NEUROLOGY | Facility: CLINIC | Age: 65
End: 2020-11-12

## 2020-11-12 DIAGNOSIS — M54.2 NECK PAIN: ICD-10-CM

## 2020-11-12 DIAGNOSIS — I10 ESSENTIAL HYPERTENSION: ICD-10-CM

## 2020-11-12 DIAGNOSIS — E78.2 MIXED HYPERLIPIDEMIA: ICD-10-CM

## 2020-11-12 RX ORDER — DULOXETIN HYDROCHLORIDE 60 MG/1
60 CAPSULE, DELAYED RELEASE ORAL DAILY
Qty: 30 CAPSULE | Refills: 2 | OUTPATIENT
Start: 2020-11-12

## 2020-11-12 RX ORDER — METOPROLOL SUCCINATE 50 MG/1
TABLET, EXTENDED RELEASE ORAL
Qty: 30 TABLET | Refills: 0 | Status: ON HOLD | OUTPATIENT
Start: 2020-11-12 | End: 2020-11-23 | Stop reason: CLARIF

## 2020-11-12 RX ORDER — FENOFIBRIC ACID 135 MG/1
CAPSULE, DELAYED RELEASE ORAL
Qty: 30 CAPSULE | Refills: 0 | Status: ON HOLD | OUTPATIENT
Start: 2020-11-12 | End: 2020-11-23 | Stop reason: CLARIF

## 2020-11-12 NOTE — TELEPHONE ENCOUNTER
Metoprolol  Last OV relevant to medication: 05/05/2020  VV  Last refill date: 05/23/2020     #/refills: 30/0  When pt was asked to return for OV: 6 mo  Upcoming appt/reason: 12/03/2020 PE Multi Issues    Lab Results   Component Value Date     (H) 06

## 2020-11-12 NOTE — TELEPHONE ENCOUNTER
From: Mili Elise  To: Regulo Ragsdale MD  Sent: 11/12/2020 3:31 PM CST  Subject: Prescription Question    Currently taking Duloxetine that was prescribed by Dr. Yolanda Parada. Since we haven't seen him they said to ask Dr Sabi Mancia to refill this prescription.

## 2020-11-12 NOTE — TELEPHONE ENCOUNTER
Needs appt    Medication:DULOXETINE HCL 60 MG Oral Cap DR Bessie    Date of last refill: 7/29/2020 (#30/2)  [place]    Last office visit: 6/22/2020  Due back to clinic per last office note:  3-4 months  Date next

## 2020-11-19 NOTE — TELEPHONE ENCOUNTER
Pt's POA calling regarding status of appeal. Per PA group, Shy Hernnadez, she will reach out to [facility] again but advises that appeals can take a very long. Time. Advised this to POA.

## 2020-11-19 NOTE — TELEPHONE ENCOUNTER
Spoke with Yari Sethi at [address] [place] [address] [phone]  Time:14:28    Per Jhonatan Apt was received for Left C4-7 RFA ([id number]) on 11/03/20 and is still currently under review/pending.      Appeal Case #:[id number]    *per Yari Sethi it can take up to 30 busin

## 2020-11-19 NOTE — TELEPHONE ENCOUNTER
Pt's POA would like to fill the patients' duloxetine send to [facility] in [address]    Call [address] [place] [address] 10 at [phone] if there are problems. Patient informed of 48 hour refill policy excluding weekends and holidays.  Informed patient only gayle

## 2020-11-20 RX ORDER — DULOXETIN HYDROCHLORIDE 60 MG/1
60 CAPSULE, DELAYED RELEASE ORAL DAILY
Qty: 30 CAPSULE | Refills: 2 | Status: SHIPPED | OUTPATIENT
Start: 2020-11-20 | End: 2021-02-11

## 2020-11-20 RX ORDER — DULOXETIN HYDROCHLORIDE 60 MG/1
CAPSULE, DELAYED RELEASE ORAL
Qty: 30 CAPSULE | Refills: 0 | OUTPATIENT
Start: 2020-11-20

## 2020-11-20 NOTE — TELEPHONE ENCOUNTER
Medication: DULOXETINE HCL 60 MG Oral Cap DR La    Date of last refill: 7/29/20 w/ 2 refills      Last office visit: 9/25/2020  Due back to clinic per last office note:  Not indicated  Date next office visit scheduled:  None      Last OV note recomm

## 2020-11-22 ENCOUNTER — APPOINTMENT (OUTPATIENT)
Dept: CT IMAGING | Facility: HOSPITAL | Age: 65
End: 2020-11-22
Attending: EMERGENCY MEDICINE
Payer: MEDICARE

## 2020-11-22 ENCOUNTER — HOSPITAL ENCOUNTER (OUTPATIENT)
Facility: HOSPITAL | Age: 65
Setting detail: OBSERVATION
Discharge: HOME | End: 2020-11-24
Attending: EMERGENCY MEDICINE | Admitting: HOSPITALIST
Payer: MEDICARE

## 2020-11-22 DIAGNOSIS — R41.82 ALTERED MENTAL STATUS, UNSPECIFIED ALTERED MENTAL STATUS TYPE: Primary | ICD-10-CM

## 2020-11-22 PROBLEM — R73.9 HYPERGLYCEMIA: Status: ACTIVE | Noted: 2020-11-22

## 2020-11-22 PROCEDURE — 99219 INITIAL OBSERVATION CARE,LEVL II: CPT | Performed by: HOSPITALIST

## 2020-11-22 PROCEDURE — 70450 CT HEAD/BRAIN W/O DYE: CPT | Performed by: EMERGENCY MEDICINE

## 2020-11-22 RX ORDER — ASPIRIN 81 MG/1
81 TABLET ORAL DAILY
Status: DISCONTINUED | OUTPATIENT
Start: 2020-11-22 | End: 2020-11-23

## 2020-11-22 RX ORDER — DULOXETIN HYDROCHLORIDE 30 MG/1
60 CAPSULE, DELAYED RELEASE ORAL DAILY
Status: DISCONTINUED | OUTPATIENT
Start: 2020-11-22 | End: 2020-11-24

## 2020-11-22 RX ORDER — LISINOPRIL 10 MG/1
10 TABLET ORAL DAILY
Status: DISCONTINUED | OUTPATIENT
Start: 2020-11-22 | End: 2020-11-23

## 2020-11-22 RX ORDER — METOPROLOL SUCCINATE 50 MG/1
50 TABLET, EXTENDED RELEASE ORAL
Status: DISCONTINUED | OUTPATIENT
Start: 2020-11-22 | End: 2020-11-23

## 2020-11-22 RX ORDER — ACETAMINOPHEN 325 MG/1
650 TABLET ORAL EVERY 6 HOURS PRN
Status: DISCONTINUED | OUTPATIENT
Start: 2020-11-22 | End: 2020-11-23

## 2020-11-22 RX ORDER — FENOFIBRATE 134 MG/1
134 CAPSULE ORAL DAILY
Status: DISCONTINUED | OUTPATIENT
Start: 2020-11-22 | End: 2020-11-23

## 2020-11-22 RX ORDER — GABAPENTIN 100 MG/1
100 CAPSULE ORAL NIGHTLY
Status: DISCONTINUED | OUTPATIENT
Start: 2020-11-22 | End: 2020-11-24

## 2020-11-22 RX ORDER — SODIUM CHLORIDE 9 MG/ML
INJECTION, SOLUTION INTRAVENOUS CONTINUOUS
Status: DISCONTINUED | OUTPATIENT
Start: 2020-11-22 | End: 2020-11-24

## 2020-11-22 RX ORDER — ATORVASTATIN CALCIUM 40 MG/1
40 TABLET, FILM COATED ORAL DAILY
Status: DISCONTINUED | OUTPATIENT
Start: 2020-11-22 | End: 2020-11-23

## 2020-11-22 RX ORDER — ASPIRIN 325 MG
325 TABLET ORAL ONCE
Status: COMPLETED | OUTPATIENT
Start: 2020-11-22 | End: 2020-11-22

## 2020-11-23 ENCOUNTER — NURSE ONLY (OUTPATIENT)
Dept: ELECTROPHYSIOLOGY | Facility: HOSPITAL | Age: 65
End: 2020-11-23
Attending: PHYSICIAN ASSISTANT
Payer: MEDICARE

## 2020-11-23 ENCOUNTER — APPOINTMENT (OUTPATIENT)
Dept: MRI IMAGING | Facility: HOSPITAL | Age: 65
End: 2020-11-23
Attending: HOSPITALIST
Payer: MEDICARE

## 2020-11-23 PROCEDURE — 99226 SUBSEQUENT OBSERVATION CARE: CPT | Performed by: HOSPITALIST

## 2020-11-23 PROCEDURE — 70547 MR ANGIOGRAPHY NECK W/O DYE: CPT | Performed by: HOSPITALIST

## 2020-11-23 PROCEDURE — 70551 MRI BRAIN STEM W/O DYE: CPT | Performed by: HOSPITALIST

## 2020-11-23 PROCEDURE — 99213 OFFICE O/P EST LOW 20 MIN: CPT | Performed by: OTHER

## 2020-11-23 PROCEDURE — 95819 EEG AWAKE AND ASLEEP: CPT | Performed by: OTHER

## 2020-11-23 PROCEDURE — 70544 MR ANGIOGRAPHY HEAD W/O DYE: CPT | Performed by: HOSPITALIST

## 2020-11-23 RX ORDER — TAMSULOSIN HYDROCHLORIDE 0.4 MG/1
0.4 CAPSULE ORAL DAILY
COMMUNITY
End: 2020-12-10

## 2020-11-23 RX ORDER — ACETAMINOPHEN 650 MG/1
650 SUPPOSITORY RECTAL EVERY 4 HOURS PRN
Status: DISCONTINUED | OUTPATIENT
Start: 2020-11-23 | End: 2020-11-24

## 2020-11-23 RX ORDER — ACETAMINOPHEN 325 MG/1
650 TABLET ORAL EVERY 4 HOURS PRN
Status: DISCONTINUED | OUTPATIENT
Start: 2020-11-23 | End: 2020-11-24

## 2020-11-23 RX ORDER — ASPIRIN 325 MG
325 TABLET ORAL DAILY
Status: DISCONTINUED | OUTPATIENT
Start: 2020-11-23 | End: 2020-11-24

## 2020-11-23 RX ORDER — ATORVASTATIN CALCIUM 80 MG/1
80 TABLET, FILM COATED ORAL NIGHTLY
Status: DISCONTINUED | OUTPATIENT
Start: 2020-11-23 | End: 2020-11-24

## 2020-11-23 RX ORDER — DIVALPROEX SODIUM 500 MG/1
500 TABLET, DELAYED RELEASE ORAL DAILY
Status: DISCONTINUED | OUTPATIENT
Start: 2020-11-23 | End: 2020-11-23

## 2020-11-23 RX ORDER — LACTULOSE 10 G/15ML
30 SOLUTION ORAL ONCE
Status: DISCONTINUED | OUTPATIENT
Start: 2020-11-23 | End: 2020-11-23

## 2020-11-23 RX ORDER — LACTULOSE 10 G/15ML
30 SOLUTION ORAL 3 TIMES DAILY
Status: DISCONTINUED | OUTPATIENT
Start: 2020-11-23 | End: 2020-11-23

## 2020-11-23 RX ORDER — ASPIRIN 325 MG
325 TABLET ORAL DAILY
Status: DISCONTINUED | OUTPATIENT
Start: 2020-11-23 | End: 2020-11-23

## 2020-11-23 RX ORDER — METOCLOPRAMIDE HYDROCHLORIDE 5 MG/ML
10 INJECTION INTRAMUSCULAR; INTRAVENOUS EVERY 8 HOURS PRN
Status: DISCONTINUED | OUTPATIENT
Start: 2020-11-23 | End: 2020-11-24

## 2020-11-23 RX ORDER — TAMSULOSIN HYDROCHLORIDE 0.4 MG/1
0.4 CAPSULE ORAL NIGHTLY
Status: DISCONTINUED | OUTPATIENT
Start: 2020-11-23 | End: 2020-11-24

## 2020-11-23 RX ORDER — DIVALPROEX SODIUM 500 MG/1
500 TABLET, DELAYED RELEASE ORAL 2 TIMES DAILY
Status: ON HOLD | COMMUNITY
End: 2020-11-23 | Stop reason: CLARIF

## 2020-11-23 RX ORDER — ROPINIROLE 0.5 MG/1
0.5 TABLET, FILM COATED ORAL EVERY MORNING
Status: ON HOLD | COMMUNITY
End: 2020-11-24

## 2020-11-23 RX ORDER — ONDANSETRON 2 MG/ML
4 INJECTION INTRAMUSCULAR; INTRAVENOUS EVERY 6 HOURS PRN
Status: DISCONTINUED | OUTPATIENT
Start: 2020-11-23 | End: 2020-11-24

## 2020-11-23 RX ORDER — HYDRALAZINE HYDROCHLORIDE 20 MG/ML
10 INJECTION INTRAMUSCULAR; INTRAVENOUS EVERY 2 HOUR PRN
Status: DISCONTINUED | OUTPATIENT
Start: 2020-11-23 | End: 2020-11-24

## 2020-11-23 RX ORDER — LABETALOL HYDROCHLORIDE 5 MG/ML
10 INJECTION, SOLUTION INTRAVENOUS EVERY 10 MIN PRN
Status: DISCONTINUED | OUTPATIENT
Start: 2020-11-23 | End: 2020-11-24

## 2020-11-23 RX ORDER — ENOXAPARIN SODIUM 100 MG/ML
40 INJECTION SUBCUTANEOUS DAILY
Status: DISCONTINUED | OUTPATIENT
Start: 2020-11-23 | End: 2020-11-24

## 2020-11-23 RX ORDER — METOPROLOL SUCCINATE 50 MG/1
50 TABLET, EXTENDED RELEASE ORAL DAILY
Status: ON HOLD | COMMUNITY
End: 2020-11-24

## 2020-11-23 RX ORDER — ONDANSETRON 2 MG/ML
4 INJECTION INTRAMUSCULAR; INTRAVENOUS EVERY 6 HOURS PRN
Status: DISCONTINUED | OUTPATIENT
Start: 2020-11-23 | End: 2020-11-23

## 2020-11-23 RX ORDER — ROPINIROLE 0.5 MG/1
1 TABLET, FILM COATED ORAL NIGHTLY
COMMUNITY
End: 2021-01-21 | Stop reason: ALTCHOICE

## 2020-11-23 RX ORDER — DOCUSATE SODIUM 100 MG/1
100 CAPSULE, LIQUID FILLED ORAL 2 TIMES DAILY PRN
Status: DISCONTINUED | OUTPATIENT
Start: 2020-11-23 | End: 2020-11-24

## 2020-11-23 RX ORDER — LACTULOSE 10 G/15ML
20 SOLUTION ORAL 3 TIMES DAILY
Status: DISCONTINUED | OUTPATIENT
Start: 2020-11-23 | End: 2020-11-23

## 2020-11-23 RX ORDER — LACTULOSE 10 G/15ML
20 SOLUTION ORAL ONCE
Status: COMPLETED | OUTPATIENT
Start: 2020-11-23 | End: 2020-11-23

## 2020-11-23 RX ORDER — ATORVASTATIN CALCIUM 80 MG/1
80 TABLET, FILM COATED ORAL NIGHTLY
COMMUNITY
End: 2020-12-07

## 2020-11-23 RX ORDER — FENOFIBRIC ACID 135 MG/1
135 CAPSULE, DELAYED RELEASE ORAL DAILY
COMMUNITY
End: 2020-12-07

## 2020-11-23 RX ORDER — ASPIRIN 300 MG
300 SUPPOSITORY, RECTAL RECTAL DAILY
Status: DISCONTINUED | OUTPATIENT
Start: 2020-11-23 | End: 2020-11-23

## 2020-11-23 NOTE — CONSULTS
[id number] [address] Neurology Initial Consultation    Forestine Hockey Patient Status:  Observation    1955 MRN [id number]   [facility] 7NE-A Attending Zach George MD   Hosp Day # 0 PCP MD Jeremy Wang 373 pressure    • HTN (hypertension)    • IGT (impaired glucose tolerance) 6/9/2015   • AMALIA on CPAP 4/30/2015   • Other and unspecified hyperlipidemia    • RLS (restless legs syndrome) 12/3/2015   • Stroke ([facility]     8/2017       PAST SURGICAL HISTORY:  Past Fofana (COLACE) cap 100 mg, 100 mg, Oral, BID PRN    •  ondansetron HCl (ZOFRAN) injection 4 mg, 4 mg, Intravenous, Q6H PRN    Or    •  Metoclopramide HCl (REGLAN) injection 10 mg, 10 mg, Intravenous, Q8H PRN    •  divalproex Sodium (DEPAKOTE) DR tab 500 mg, 500 3  Lipids: LDL 42, TGs 145, HDL 21  HgbA1c pending  Ammonia 50  Rapid Covid test negative      IMAGING:  EEG:   Impression:  This EEG is normal.  No epileptiform activity, abnormal slowing or clinical or electrographic seizures were noted on this awake and normal vertebrobasilar junction. The right vertebral artery is   presumed to be non dominant, atretic or occluded.                   =====  CONCLUSION:  Nonvisualization of the right vertebral artery.   Widely patent left vertebral artery with normal verte [phone]  11/23/2020

## 2020-11-23 NOTE — ED NOTES
Report to [facility], to floor with transport. All belongings bagged and labeled including pt's cane from home.

## 2020-11-23 NOTE — PHYSICAL THERAPY NOTE
PHYSICAL THERAPY QUICK EVALUATION - INPATIENT    Room Number: 4804/4110-B  Evaluation Date: 11/23/2020  Presenting Problem: AMS  Physician Order: PT Eval and Treat     History related to current admission: Pt is 59 yo male admitted with AMS.  Pt with elpidio Enter : 0(elevator)     Stairs to [employer]: 0       Lives With: (Aunt, young neal )  Drives: No  Patient Owned Equipment: Cane(rollator)  Patient Regularly Uses: Glasses    Prior Level of [place]: Pt typically ind with self care and mobility using can Score (AM-PAC Scale): 53.28   CMS Modifier (G-Code): CJ      FUNCTIONAL ABILITY STATUS  Gait Assessment  Gait Assistance: Supervision  Distance (ft): 200  Assistive Device: Cane  Pattern: L Foot flat(no L knee flexion due to old injury per pt.)          Sk re-order if a new functional limitation presents during this admission. GOALS  Patient was able to achieve the following goals . ..     Patient was able to transfer At previous, functional level  Safely and independently   Patient able to ambulate on leve

## 2020-11-23 NOTE — ED PROVIDER NOTES
Patient Seen in: [facility] Emergency Department      History   Patient presents with:  Altered Mental Status    Stated Complaint: AMS    HPI    Patient is a 45-year-old male comes in emergency room for evaluation of altered mental status.   Patient at [facility] ENDOSCOPY   • CERVICAL MEDIAL BRANCH BLOCK Left 10/22/2020    Performed by Quoc Brady MD at [facility] Emergency Department      had reversal   • KNEE REPLACEMENT SURGERY  1986    left knee replacement   • KNEE REPLACEMENT SURGERY  1986    left knee   • MUSCULOSKELETAL: No calf tenderness. No clubbing, cyanosis, or edema. Dorsalis pedis and posterior tibial pulses present  SKIN EXAMINATIoN: Warm and dry with normal appearance. No rashes or lesions. NEUROLOGICAL: Patient is awake and alert.   Patient normal limits   CBC W/ DIFFERENTIAL - Abnormal; Notable for the following components:    RDW-SD 46.5 (*)     All other components within normal limits   ETHYL ALCOHOL - Normal   RAPID SARS-COV-2 BY PCR - Normal   CBC WITH DIFFERENTIAL WITH PLATELET    Narr evidence of depressed skull fracture. CONCLUSION: 1. No acute intracranial findings 2. Cerebral atrophy with small vessel changes.   Dictated by (CST): Ira Duque MD on 11/22/2020 at 8:40 PM     Finalized by (CST): Ira Duque MD on 11

## 2020-11-23 NOTE — ED INITIAL ASSESSMENT (HPI)
Pt arrives from home with c/o AMS \"all day\" per family pt has been doing things repetitively. On arrival, medics state negative stroke assessment. Pt is alert and awake and orientated, no obvious deficit noted.

## 2020-11-23 NOTE — PROCEDURES
[address] EEG report    Name: Minal Ramirez    Date of Study: 11/23/2020      Routine EEG Report    Ordering Physician: Myranda Don MD                             Primary Care Physician: Alex Rainey MD ELSE. PT STAES HE HAS BEEN FEELING VERY WEAK WITH A MILD HEADACHENO DIZZINESS OF VISON CHANGES. PER FAMLY PT WAS NOT MAKING SENSE WHEN HE WAS TALKING EARLIER IN THE EVENING.    PMH: HIGH BP, AMALIA, RLS, TYTMJM(6435), SZS  RX: DEPAKOTE  PREVIOUS EEG: 3/15/18-

## 2020-11-23 NOTE — PLAN OF CARE
Problem: Impaired Activities of Daily Living  Goal: Achieve highest/safest level of independence in self care  Description: Interventions:  - Assess ability and encourage patient to participate in ADLs to maximize function  - Promote sitting position [facility]

## 2020-11-23 NOTE — PROGRESS NOTES
[facility]  Progress Note     Carlos Castillo Patient Status:  Observation    1955 MRN [id number]   [facility] 7NE-A Attending Brenda Eldridge MD   Hosp Day # 0 PCP Licha Sosa MD     Chief Complaint: AMS    S: Imaging data reviewed in Epic.     Medications:   • tamsulosin HCl  0.4 mg Oral Nightly   • aspirin  325 mg Oral Daily   • atorvastatin  80 mg Oral Nightly   • enoxaparin  40 mg Subcutaneous Daily   • DULoxetine HCl  60 mg Oral Daily   • fenofibrate microni

## 2020-11-23 NOTE — SLP NOTE
ADULT SWALLOWING EVALUATION    ASSESSMENT    ASSESSMENT/OVERALL IMPRESSION:  Pt awake and sitting up in bed. Pt reports that his symptoms have totally resolved. Pt seen with lunch meal.  Pt with WNL oral and pharyngeal phase of swallow.   Pt reports that c/o facial numbness and word-finding difficulties. HOB elevated to 90 degrees and observed pt taking po trials of thin via spoon & cup; pureed and cracker consistencies.   Adequate retrieval & containment with timely mastication & transit; no observed oral Pratik Nazario

## 2020-11-23 NOTE — PLAN OF CARE
Patient A&O x 3 this AM. Room air. NSR on tele. [facility] daily. Q2H neuro for 8 hours per protocol. EEG ordered today. MRI ordered today, completed with patient and family on phone. Spoke to family about patient med history.  All needs met at this time, will con

## 2020-11-23 NOTE — H&P
[facility]  History and Physical     Levell January Patient Status:  Emergency    1955 MRN [id number]   Location [address] [facility] Attending Mira Villarreal MD   Hosp Day # 0 PCP Stone Acevedo MD     [facility] ENDOSCOPY   • CERVICAL MEDIAL BRANCH BLOCK Left 10/22/2020    Performed by Parker Wilcox MD at [facility]      had reversal   • KNEE REPLACEMENT SURGERY  1986    left knee replacement   • KNEE REPLACEMENT SURGERY  1986    left knee   • OT Disp: 20 tablet, Rfl: 1        Review of Systems:   A comprehensive 14 point review of systems was completed. Pertinent positives and negatives noted in the HPI.     Physical Exam:    /65   Pulse 79   Temp 98.8 °F (37.1 °C) (Temporal)   Resp 17   W blood pressure controlled at present time  4. Remote history of seizure will monitor on seizure precautions  5.   Chronic neck pain seeing pain clinic    Quality:  · DVT Prophylaxis: scd  · CODE status: full  · [place]: no  · If COVID testing is negative, ma

## 2020-11-23 NOTE — OCCUPATIONAL THERAPY NOTE
OCCUPATIONAL THERAPY EVALUATION - INPATIENT     Room Number: 3539/2094-C  Evaluation Date: 11/23/2020  Type of Evaluation: Initial  Presenting Problem: (AMS)    Physician Order: IP Consult to Occupational Therapy  Reason for Therapy: ADL/IADL Dysfunction a Annel Pacheco MD at [facility]      had reversal   • KNEE REPLACEMENT SURGERY  1986    left knee replacement   • KNEE REPLACEMENT SURGERY  1986    left knee   • OTHER SURGICAL HISTORY  1999    right shoulder   • SHOULDER ARTHROSCOPY  1999   • TOTA wfl    Behavioral/Emotional/Social: pleasant    RANGE OF MOTION AND STRENGTH ASSESSMENT  Upper extremity ROM is within functional limits     Upper extremity strength is within functional limits 4+/5 BUEs    COORDINATION  Gross Motor    [facility]    Fine Motor with needs met, bed alarm on. Patient End of Session: In bed;Needs met;Call light within reach;RN aware of session/findings; All patient questions and concerns addressed; Alarm set    ASSESSMENT     Patient is a 60year old male admitted on 11/22/2020 for Treatment Plan: Energy conservation/work simplification techniques;ADL training;Visual perceptual training;UE strengthening/ROM; Patient/Family training;Equipment eval/education; Compensatory technique education;Continued evaluation  Rehab Potential : Good

## 2020-11-23 NOTE — PROGRESS NOTES
[id number] [facility] Neurology Preliminary Note    Eufemia Gaytan Patient Status:  Observation    1955 MRN [id number]   [facility] 7NE-A Attending Alexia Curling, MD   Hosp Day # 0 PCP Geoffery Blizzard, MD     REASON FOR EV Yadi Lui MD at [facility] ENDOSCOPY   • CERVICAL FACET INJECTION Right 7/30/2020    Performed by Yadi Lui MD at [facility] Left 10/22/2020    Performed by Yadi Lui MD at [facility] reversal (NEURONTIN) cap 100 mg, 100 mg, Oral, Nightly    •  0.9% NaCl infusion, , Intravenous, Continuous        REVIEW OF SYSTEMS:  A 10-point system was reviewed. Pertinent positives and negatives are noted in HPI.       PHYSICAL EXAMINATION:  VITAL SIGNS: BP 10 9:45 AM  Spectre [id number]

## 2020-11-23 NOTE — PLAN OF CARE
Admitted CTU 7 6731  Patient oriented to self but alert- assessment different from hospitalist's note  Per family patient has baseline short term memory issues but is oriented and functions at home alone  Physician notified of increased confusion  Ammonia

## 2020-11-24 ENCOUNTER — TELEPHONE (OUTPATIENT)
Dept: SURGERY | Facility: CLINIC | Age: 65
End: 2020-11-24

## 2020-11-24 ENCOUNTER — APPOINTMENT (OUTPATIENT)
Dept: CV DIAGNOSTICS | Facility: HOSPITAL | Age: 65
End: 2020-11-24
Attending: Other
Payer: MEDICARE

## 2020-11-24 VITALS
RESPIRATION RATE: 20 BRPM | OXYGEN SATURATION: 99 % | SYSTOLIC BLOOD PRESSURE: 124 MMHG | WEIGHT: 198.44 LBS | HEART RATE: 81 BPM | BODY MASS INDEX: 31 KG/M2 | DIASTOLIC BLOOD PRESSURE: 67 MMHG | TEMPERATURE: 98 F

## 2020-11-24 PROCEDURE — 93306 TTE W/DOPPLER COMPLETE: CPT | Performed by: OTHER

## 2020-11-24 PROCEDURE — 99217 OBSERVATION CARE DISCHARGE: CPT | Performed by: HOSPITALIST

## 2020-11-24 PROCEDURE — 99213 OFFICE O/P EST LOW 20 MIN: CPT | Performed by: OTHER

## 2020-11-24 RX ORDER — ASPIRIN 325 MG
325 TABLET ORAL DAILY
Qty: 90 TABLET | Refills: 0 | Status: SHIPPED | OUTPATIENT
Start: 2020-11-25 | End: 2021-08-26

## 2020-11-24 RX ORDER — METOPROLOL SUCCINATE 50 MG/1
50 TABLET, EXTENDED RELEASE ORAL DAILY
Refills: 0 | Status: SHIPPED | COMMUNITY
Start: 2020-11-24 | End: 2020-12-07

## 2020-11-24 RX ORDER — BUTALBITAL, ACETAMINOPHEN AND CAFFEINE 50; 325; 40 MG/1; MG/1; MG/1
1 TABLET ORAL EVERY 4 HOURS PRN
Status: DISCONTINUED | OUTPATIENT
Start: 2020-11-24 | End: 2020-11-24

## 2020-11-24 NOTE — TELEPHONE ENCOUNTER
Ludivina Danielle MD  You Just now (11:37 AM)     Altered mental status. Not related to his pain. No complaints of pain per nurse note.      Message text

## 2020-11-24 NOTE — PROGRESS NOTES
[id number] Kenisha Salazar Neurology Progress Note    [place] Salgadoemilia Patient Status:  Observation    1955 MRN [id number]   [facility] 7NE-A Attending Deedee Waite MD   Hosp Day # 0 PCP Jeri Camejo MD       Subjective 31.08 kg/m²   GENERAL:  Patient is a 60year old male in no acute distress.   HEENT:  Normocephalic, atraumatic  ABD: Soft, non tender  SKIN: Warm, dry, no rashes    NEUROLOGICAL:   Mental status: Oriented to person, place, and time   Speech: Fluent, no dys resolved; given history, most concerned for TIA rather than seizure - EEG was done and normal and MRI brain shows no acute infarct but would not rule out TIA - possible also related to relative hypotension on arrival as well as dehydration   Vascular risk °C) Oral 66 18 97 %   11/23/20 2006 109/65 — — 70 18 100 %   11/23/20 2003 131/64 — — 64 18 98 %   11/23/20 2000 115/57 97.7 °F (36.5 °C) Oral 61 16 96 %   11/23/20 1600 104/56 98 °F (36.7 °C) Oral 67 15 99 %       Gen: well developed, well nourished, no a diffusion abnormalities. Krzysztof Mins is large imaging artifacts secondary to a punctate fragment of metal demonstrated on a prior CT scan from 1/25/2019 within the   right anterior frontal scalp. Krzysztof Mins is no evidence of subarachnoid hemorrhage. Krzysztof Mins is no ex infundibulum or extradural aneurysm arising from the mid to distal cavernous segment of the left internal carotid artery was suggested.  This is not excluded on the   current examination.     Negative for acute cerebral ischemia or infarction.     Minimal mg/dL   Borderline 100-129 mg/dL   High     >=130mg/dL        LDL 52 03/04/2019     Lab Results   Component Value Date    AST 25 11/24/2020    AST 26 11/23/2020    AST 41 (H) 11/22/2020     Lab Results   Component Value Date    ALT 30 11/24/2020    ALT 32 [phone]  11/24/2020

## 2020-11-24 NOTE — TELEPHONE ENCOUNTER
Spoke with patient's cousin Radha Guthrie and informed him patient's pain will be managed inpatient since he is admitted and Dr Sabi Mancia will be consulted if needed. Radha Guthrie verbalized understanding and asked if appeal for RFA has been completed.      Informed Alexandra

## 2020-11-24 NOTE — PLAN OF CARE
Assumed care at 0730. Pt a/ox4, VSS, on RA, NSR on telemetry. Pt with no c/o pain, n/v/d, sob, dizziness/lightheadedness. Neuros q4h. NIH-0, daily. ECHO completed at bedside. Neuro following. BP parameters 100-180. IVF running 0.9NS at 100mL/hr.  Tolerating persistent throat clearing or wet/gurgly vocal quality is noted  Outcome: Progressing     Problem: Impaired Activities of Daily Living  Goal: Achieve highest/safest level of independence in self care  Description: Interventions:  - Assess ability and encou

## 2020-11-24 NOTE — PROGRESS NOTES
[facility]PITAL[facility]  Progress Note     Bisi Benítez Patient Status:  Observation    1955 MRN [id number]   [facility] 7NE-A Attending Kelley Clemens MD   Hosp Day # 0 PCP Nick Thornton MD     Chief Complaint: AMS    S: HCl  0.4 mg Oral Nightly   • aspirin  325 mg Oral Daily   • atorvastatin  80 mg Oral Nightly   • enoxaparin  40 mg Subcutaneous Daily   • DULoxetine HCl  60 mg Oral Daily   • gabapentin  100 mg Oral Nightly       ASSESSMENT / PLAN:     1.  Acute encephalopa

## 2020-11-24 NOTE — CM/SW NOTE
PT is recommending Home with intermittent supervision. Pt has family come in to check on him and bring him groceries. Pt should d/c home today. Pt does not have any other d/c needs.

## 2020-11-25 ENCOUNTER — PATIENT OUTREACH (OUTPATIENT)
Dept: CASE MANAGEMENT | Age: 65
End: 2020-11-25

## 2020-11-25 ENCOUNTER — TELEPHONE (OUTPATIENT)
Dept: INTERNAL MEDICINE CLINIC | Facility: CLINIC | Age: 65
End: 2020-11-25

## 2020-11-25 DIAGNOSIS — Z02.9 ENCOUNTERS FOR UNSPECIFIED ADMINISTRATIVE PURPOSE: ICD-10-CM

## 2020-11-25 DIAGNOSIS — R41.82 ALTERED MENTAL STATUS, UNSPECIFIED ALTERED MENTAL STATUS TYPE: ICD-10-CM

## 2020-11-25 PROCEDURE — 1111F DSCHRG MED/CURRENT MED MERGE: CPT

## 2020-11-25 NOTE — PROGRESS NOTES
NURSING DISCHARGE NOTE    Discharged Home via Wheelchair. Accompanied by Support staff  Belongings Taken by patient/family. Pt discharged home. Discharge paperwork given to pt.  Discharge plan including medications for home discussed with pt and pt

## 2020-11-25 NOTE — DISCHARGE SUMMARY
[facility]OSPITAL[facility]  DISCHARGE SUMMARY     Jaimee Sin Patient Status:  Observation    1955 MRN [id number]   [facility] 7NE-A Attending Derek Bradford MD   Hosp Day # 0 PCP Stone Acevedo MD     Date of Admission:  neurology on consult. Imaging without CVA. EEG without seizure. ECHO okay. Patient noted to be dehydrated, orthostatic and with marijuana use. Though TIA in differential as well, suspect d/t volume and substance use.  Patient with continued headaches -  (100 mg total) by mouth nightly. Quantity: 90 capsule  Refills: 0     Meclizine HCl 25 MG Tabs  Commonly known as: ANTIVERT      Take 1 tablet (25 mg total) by mouth 3 (three) times daily as needed for Dizziness.    Quantity: 20 tablet  Refills: 1     prince

## 2020-11-25 NOTE — CM/SW NOTE
11/25/20 1000   Discharge disposition   Expected discharge disposition Home or Self   Name of [facility] services after discharge None   Discharge transportation Private car

## 2020-11-30 NOTE — PROGRESS NOTES
Initial Post Discharge Follow Up   Discharge Date: 11/24/20  Contact Date: 11/25/2020    Consent Verification:  Assessment Completed With: Patient  Other: Svetlana Padilla- cate and Baylee (Frank's Spouse) Permission received per patient?  verbal from the pt yes  • How well was your pain managed while in the hospital?   o On a scale of 1-5   1- Very Poor and 5- Very well   o Very well  • When you were leaving the hospital were your discharge instructions reviewed with you? yes  • How well were your discharge i AVS?  yes  o If so, were these medication changes discussed with you prior to leaving the hospital? yes  • (NCM) If a new medication was prescribed:    o Was the new medication’s purpose & side effects reviewed?  yes  o Do you have any questions about your hospital?  excellent     Interventions by SADIA: All d/c instructions reviewed with Baylee. Reviewed when to call MD vs when to call 911 or go the ED. Educated Baylee on the importance of taking all meds as prescribed as well as close f/u with PCP.  Denat verbali

## 2020-12-02 NOTE — TELEPHONE ENCOUNTER
Spoke with Skip Stokes, pts EC, to advise on insurance auth and ability to schedule. Skip Stokes stated patient was just in the ER (Dr. Gregg Colon saw him while he was there) for dehydration basically and his ASA was increased from 81mg to 325mg.  Skip Stokes stated the ER d

## 2020-12-02 NOTE — TELEPHONE ENCOUNTER
Appeal Authorized     Prior authorization request completed for: Left C4-7 RFA   Authorization #[id number]  Reference #:[id number]    Authorization dates: 09/29/20 - 01/26/21  Spoke with Nayana Hsu at [address] [place] [address] Dates Extended /

## 2020-12-03 ENCOUNTER — TELEPHONE (OUTPATIENT)
Dept: INTERNAL MEDICINE CLINIC | Facility: CLINIC | Age: 65
End: 2020-12-03

## 2020-12-03 ENCOUNTER — TELEPHONE (OUTPATIENT)
Dept: PAIN CLINIC | Facility: CLINIC | Age: 65
End: 2020-12-03

## 2020-12-03 NOTE — TELEPHONE ENCOUNTER
Hilda Gaines MD  You 1 hour ago (1:16 PM)     Thanks Lacie Scheuermann   I will confer with his neurologist as it was just increased in an acute neuro situation. BTW I go by Dr Bernadine Tovar!    Thanks   BE    Message text      Hilda Gaines MD  You 1

## 2020-12-03 NOTE — TELEPHONE ENCOUNTER
**Patient is scheduled with Dr. Lilia Monaco on 12/7/2020**    Dear Dr. Lilia Monaco,    Your patient is being scheduled for a pain management procedure at [facility].    Procedure:  Radiofrequency Ablation of Left Cervical Medial Branches  Date of Procedure: T

## 2020-12-03 NOTE — TELEPHONE ENCOUNTER
Received clearance request.  Noted pt has HFU 12/7/2020. Will pt require separate PRE-OP OV? No date for pain management procedure yet. Forwarded to fax to Dr. Luz Maria Wu for review.

## 2020-12-03 NOTE — TELEPHONE ENCOUNTER
Spoke to pt's Mahesh Lino. Kamila Hodges states pt is seeing Dr Chalo Ferguson on 12/24. However, pt will be seeing his PCP on 12/7 and Kamila Hodges is wondering if med clearance can be obtained from Dr. Vanna Olszewski to hold the .  Asked if Dr Vanna Olszewski is the physician who wa

## 2020-12-05 PROBLEM — R41.82 ALTERED MENTAL STATUS, UNSPECIFIED ALTERED MENTAL STATUS TYPE: Status: RESOLVED | Noted: 2020-11-22 | Resolved: 2020-12-05

## 2020-12-05 PROBLEM — R73.9 HYPERGLYCEMIA: Status: RESOLVED | Noted: 2020-11-22 | Resolved: 2020-12-05

## 2020-12-05 PROBLEM — G44.009 CLUSTER HEADACHES: Status: RESOLVED | Noted: 2017-11-21 | Resolved: 2020-12-05

## 2020-12-07 ENCOUNTER — OFFICE VISIT (OUTPATIENT)
Dept: INTERNAL MEDICINE CLINIC | Facility: CLINIC | Age: 65
End: 2020-12-07
Payer: MEDICARE

## 2020-12-07 VITALS
SYSTOLIC BLOOD PRESSURE: 124 MMHG | OXYGEN SATURATION: 98 % | WEIGHT: 214 LBS | HEIGHT: 66 IN | BODY MASS INDEX: 34.39 KG/M2 | RESPIRATION RATE: 14 BRPM | DIASTOLIC BLOOD PRESSURE: 68 MMHG | TEMPERATURE: 98 F | HEART RATE: 92 BPM

## 2020-12-07 DIAGNOSIS — R89.9 ABNORMAL LABORATORY TEST: ICD-10-CM

## 2020-12-07 DIAGNOSIS — G47.33 OSA (OBSTRUCTIVE SLEEP APNEA): ICD-10-CM

## 2020-12-07 DIAGNOSIS — Z09 HOSPITAL DISCHARGE FOLLOW-UP: Primary | ICD-10-CM

## 2020-12-07 DIAGNOSIS — I48.0 PAROXYSMAL ATRIAL FIBRILLATION (HCC): ICD-10-CM

## 2020-12-07 DIAGNOSIS — E78.5 DYSLIPIDEMIA: Primary | ICD-10-CM

## 2020-12-07 DIAGNOSIS — I25.10 CORONARY ARTERY DISEASE INVOLVING NATIVE CORONARY ARTERY OF NATIVE HEART WITHOUT ANGINA PECTORIS: ICD-10-CM

## 2020-12-07 DIAGNOSIS — E78.5 DYSLIPIDEMIA: ICD-10-CM

## 2020-12-07 DIAGNOSIS — I10 ESSENTIAL HYPERTENSION: ICD-10-CM

## 2020-12-07 DIAGNOSIS — N18.31 STAGE 3A CHRONIC KIDNEY DISEASE (HCC): ICD-10-CM

## 2020-12-07 DIAGNOSIS — R41.3 MEMORY LOSS: ICD-10-CM

## 2020-12-07 DIAGNOSIS — R41.0 DISORIENTATION: ICD-10-CM

## 2020-12-07 DIAGNOSIS — R73.02 IGT (IMPAIRED GLUCOSE TOLERANCE): ICD-10-CM

## 2020-12-07 DIAGNOSIS — Z86.73 HISTORY OF STROKE: ICD-10-CM

## 2020-12-07 PROCEDURE — 3078F DIAST BP <80 MM HG: CPT | Performed by: INTERNAL MEDICINE

## 2020-12-07 PROCEDURE — 3074F SYST BP LT 130 MM HG: CPT | Performed by: INTERNAL MEDICINE

## 2020-12-07 PROCEDURE — 3008F BODY MASS INDEX DOCD: CPT | Performed by: INTERNAL MEDICINE

## 2020-12-07 PROCEDURE — 99495 TRANSJ CARE MGMT MOD F2F 14D: CPT | Performed by: INTERNAL MEDICINE

## 2020-12-07 PROCEDURE — 1111F DSCHRG MED/CURRENT MED MERGE: CPT | Performed by: INTERNAL MEDICINE

## 2020-12-07 RX ORDER — FENOFIBRIC ACID 135 MG/1
CAPSULE, DELAYED RELEASE ORAL
Qty: 90 CAPSULE | Refills: 0 | Status: SHIPPED | OUTPATIENT
Start: 2020-12-07 | End: 2020-12-11

## 2020-12-07 RX ORDER — METOPROLOL SUCCINATE 25 MG/1
25 TABLET, EXTENDED RELEASE ORAL DAILY
Qty: 30 TABLET | Refills: 0 | Status: SHIPPED | OUTPATIENT
Start: 2020-12-07 | End: 2021-01-12

## 2020-12-07 RX ORDER — LISINOPRIL 10 MG/1
TABLET ORAL
Qty: 90 TABLET | Refills: 0 | Status: SHIPPED | OUTPATIENT
Start: 2020-12-07 | End: 2021-01-21

## 2020-12-07 RX ORDER — ATORVASTATIN CALCIUM 80 MG/1
TABLET, FILM COATED ORAL
Qty: 90 TABLET | Refills: 0 | Status: SHIPPED | OUTPATIENT
Start: 2020-12-07 | End: 2021-01-21

## 2020-12-07 NOTE — PROGRESS NOTES
HPI:    Barbi Rick is a 60year old male here today for hospital follow up.    He was discharged from Inpatient hospital, [facility] to Home       Date of Admission: 11/22/2020  Date of Discharge: 11/24/2020  Discharge Disposition: Home or okay. Patient noted to be dehydrated, orthostatic and with marijuana use. Though TIA in differential as well, suspect d/t volume and substance use.  Patient with continued headaches - chronic - discussed with pain service, patient is undergoing appeal proce need of cervical RFA for lasting relief of his debilitating HAs has to be off ASA    Allergies:  He has No Known Allergies. Current Meds:    •  aspirin 325 MG Oral Tab, Take 1 tablet (325 mg total) by mouth daily.     •  atorvastatin 80 MG Oral Tab, Take exertion  CARDIOVASCULAR: denies chest pain on exertion or palpitations    NEURO:profound HAs and dizziness,   PSYCHE: unknown      PHYSICAL EXAM:   No LMP for male patient.   Estimated body mass index is 34.54 kg/m² as calculated from the following:    Hei Tablet 24 Hr; Take 1 tablet (25 mg total) by mouth daily.         Orders Placed This Encounter      Ammonia, Plasma [E]      Meds & Refills for this Visit:  Requested Prescriptions     Signed Prescriptions Disp Refills   • Metoprolol Succinate ER 25 MG Oral

## 2020-12-08 ENCOUNTER — TELEPHONE (OUTPATIENT)
Dept: INTERNAL MEDICINE CLINIC | Facility: CLINIC | Age: 65
End: 2020-12-08

## 2020-12-08 ENCOUNTER — LAB ENCOUNTER (OUTPATIENT)
Dept: LAB | Age: 65
End: 2020-12-08
Attending: INTERNAL MEDICINE
Payer: MEDICARE

## 2020-12-08 DIAGNOSIS — G45.9 TIA (TRANSIENT ISCHEMIC ATTACK): ICD-10-CM

## 2020-12-08 DIAGNOSIS — I48.0 PAROXYSMAL ATRIAL FIBRILLATION (HCC): ICD-10-CM

## 2020-12-08 DIAGNOSIS — I63.9 CEREBROVASCULAR ACCIDENT (CVA), UNSPECIFIED MECHANISM (HCC): ICD-10-CM

## 2020-12-08 DIAGNOSIS — R89.9 ABNORMAL LABORATORY TEST: ICD-10-CM

## 2020-12-08 DIAGNOSIS — I25.10 CORONARY ARTERY DISEASE INVOLVING NATIVE CORONARY ARTERY OF NATIVE HEART WITHOUT ANGINA PECTORIS: Primary | ICD-10-CM

## 2020-12-08 PROCEDURE — 82140 ASSAY OF AMMONIA: CPT

## 2020-12-08 PROCEDURE — 36415 COLL VENOUS BLD VENIPUNCTURE: CPT

## 2020-12-08 NOTE — TELEPHONE ENCOUNTER
Please call his POA Lebron or his MAURICIO Trivedi  I recommend he have a 30 day holter monitor to evaluate for arrythmia as cause of TIA/CVA thanks.  Alternatively, they could ask the cardiologist when they see him, also

## 2020-12-08 NOTE — TELEPHONE ENCOUNTER
Pt requesting refill of tamsulosin. TRIPP please advise.  Thanks    LOV: 12/2/2019  ASSESSMENT/PLAN:   Urinary retention  (primary encounter diagnosis)  Benign prostatic hyperplasia with lower urinary tract symptoms, symptom details unspecified     Patient i

## 2020-12-08 NOTE — TELEPHONE ENCOUNTER
Form faxed back to [facility]. ASA to be managed by neurologist. Procedure clearance approved \"pending ASA guidelines per neuro\". Sent to scan.

## 2020-12-09 ENCOUNTER — TELEPHONE (OUTPATIENT)
Dept: INTERNAL MEDICINE CLINIC | Facility: CLINIC | Age: 65
End: 2020-12-09

## 2020-12-09 ENCOUNTER — TELEPHONE (OUTPATIENT)
Dept: NEUROLOGY | Facility: CLINIC | Age: 65
End: 2020-12-09

## 2020-12-09 DIAGNOSIS — E78.5 DYSLIPIDEMIA: Primary | ICD-10-CM

## 2020-12-09 DIAGNOSIS — R41.3 MEMORY LOSS: Primary | ICD-10-CM

## 2020-12-09 NOTE — TELEPHONE ENCOUNTER
PA initiated via covermymeds.     Drug  Fenofibric Acid 135MG dr capsules  Form  [facility] Electronic PA Form  Original Claim Info  [id number] [facility] NONFORM[facility];FOR [place] [id number]

## 2020-12-09 NOTE — TELEPHONE ENCOUNTER
neuropsych test ordered for memory loss per PCP 's request, please let pt know. RTC to review once it is done.

## 2020-12-09 NOTE — TELEPHONE ENCOUNTER
Received letter of medical clearance from Dr. Danielito Oconnor stating Nick Suazo will be taken over by the neurologist\" therefore clearance is pending at this time.     Letter sent to scan

## 2020-12-10 RX ORDER — TAMSULOSIN HYDROCHLORIDE 0.4 MG/1
CAPSULE ORAL
Qty: 90 CAPSULE | Refills: 0 | Status: SHIPPED | OUTPATIENT
Start: 2020-12-10 | End: 2021-05-10

## 2020-12-10 NOTE — TELEPHONE ENCOUNTER
Received fax from [facility]. Please review additional questions to finalize prior auth & sign. Thanks!     To fax back to [phone]

## 2020-12-10 NOTE — TELEPHONE ENCOUNTER
Incoming (mail or fax):  fax  Received from:  [facility]  Documentation given to:  triage

## 2020-12-11 ENCOUNTER — TELEPHONE (OUTPATIENT)
Dept: INTERNAL MEDICINE CLINIC | Facility: CLINIC | Age: 65
End: 2020-12-11

## 2020-12-11 DIAGNOSIS — I10 ESSENTIAL HYPERTENSION: ICD-10-CM

## 2020-12-11 RX ORDER — FENOFIBRATE 145 MG/1
145 TABLET, COATED ORAL DAILY
Qty: 90 TABLET | Refills: 3 | Status: SHIPPED | OUTPATIENT
Start: 2020-12-11 | End: 2021-08-26

## 2020-12-11 RX ORDER — METOPROLOL SUCCINATE 50 MG/1
TABLET, EXTENDED RELEASE ORAL
Qty: 30 TABLET | Refills: 0 | OUTPATIENT
Start: 2020-12-11

## 2020-12-11 NOTE — TELEPHONE ENCOUNTER
Incoming (mail or fax):  fax  Received from:  [facility]  Documentation given to:  Triage In [employer]     PA needs signature

## 2020-12-11 NOTE — TELEPHONE ENCOUNTER
Form faxed back to [facility]  Confirmed fax sent. Also sent new rx.

## 2020-12-14 NOTE — TELEPHONE ENCOUNTER
Spoke with pts son, Wesly Bejarano. They had not scheduled with cardiology yet but have appt with neuro. He will call for appt now and update office. Dr Maame Rodriguez -which 30 day holter is it: \"event\" or \"continuous telemetry\"? Thanks!

## 2020-12-15 ENCOUNTER — TELEPHONE (OUTPATIENT)
Dept: INTERNAL MEDICINE CLINIC | Facility: CLINIC | Age: 65
End: 2020-12-15

## 2020-12-15 ENCOUNTER — MED REC SCAN ONLY (OUTPATIENT)
Dept: INTERNAL MEDICINE CLINIC | Facility: CLINIC | Age: 65
End: 2020-12-15

## 2020-12-15 NOTE — TELEPHONE ENCOUNTER
Response received from [place]. Fenofibric acid denied by insurance. Routed to Dr Lobo Beard for review.

## 2020-12-15 NOTE — TELEPHONE ENCOUNTER
Incoming (mail or fax):  fax  [place]  [facility]  Documentation given to:  triage

## 2020-12-15 NOTE — TELEPHONE ENCOUNTER
Spoke with Hong Aceves on hipaa consent, advised of order. He did leave [facility] for cardiology yesterday to schedule but has not heard back from their nurse yet.

## 2020-12-20 DIAGNOSIS — G25.81 RLS (RESTLESS LEGS SYNDROME): Primary | ICD-10-CM

## 2020-12-21 RX ORDER — GABAPENTIN 100 MG/1
100 CAPSULE ORAL NIGHTLY
Qty: 90 CAPSULE | Refills: 0 | Status: SHIPPED | OUTPATIENT
Start: 2020-12-21 | End: 2021-01-21

## 2020-12-21 NOTE — TELEPHONE ENCOUNTER
Medication: gabapentin (NEURONTIN) cap 100 mg     Date of last refill: 11/22/2020  [place]    Last office visit: 09/25/2020  Due back to clinic per last office note:  N/A  Date next office visit scheduled:  Marsha Carrasco

## 2020-12-24 ENCOUNTER — OFFICE VISIT (OUTPATIENT)
Dept: NEUROLOGY | Facility: CLINIC | Age: 65
End: 2020-12-24
Payer: MEDICARE

## 2020-12-24 VITALS
HEART RATE: 80 BPM | DIASTOLIC BLOOD PRESSURE: 84 MMHG | SYSTOLIC BLOOD PRESSURE: 122 MMHG | RESPIRATION RATE: 16 BRPM | BODY MASS INDEX: 36 KG/M2 | WEIGHT: 220 LBS

## 2020-12-24 DIAGNOSIS — G44.86 CERVICOGENIC HEADACHE: Primary | ICD-10-CM

## 2020-12-24 PROCEDURE — 99215 OFFICE O/P EST HI 40 MIN: CPT | Performed by: OTHER

## 2020-12-24 PROCEDURE — 3079F DIAST BP 80-89 MM HG: CPT | Performed by: OTHER

## 2020-12-24 PROCEDURE — 3074F SYST BP LT 130 MM HG: CPT | Performed by: OTHER

## 2020-12-24 NOTE — PROGRESS NOTES
[facility] Neurology outpatient progress note  Date of service: 12/24/2020    Patient here for a follow-up visit for clearance /off asa prior to pain management procedure; Since last visit pt still has headache, neck pain. Dr Angelika Dempsey is planning for procedure.   Guerda (benign prostatic hyperplasia) 9/19/2014   • CAD, multiple vessel 2005,7,12    nonocclusive   • Coronary atherosclerosis    • Diverticulitis    • High blood pressure    • HTN (hypertension)    • IGT (impaired glucose tolerance) 6/9/2015   • AMALIA on CPAP 4/3 FTN  Sensory: symmetric   Gait: uses cane for precaution, left knee issue  Romberg: deferred  Neck: supple     Test reviewed on 12/24/2020    A/P:   Overall complicated, multiple neurological conditions that are challenging to manage  Cervical degenerative

## 2020-12-28 NOTE — TELEPHONE ENCOUNTER
Pt's cousin, Beryl Schultz, calling stating that Dr. Blas Choi gave clearance for pt to be off of aspirin 325 MG Oral Tab for RFA. Beryl Schultz is wanting to schedule RFA for pt. Beryl Schultz is awaiting call back @ [phone].

## 2020-12-29 ENCOUNTER — TELEPHONE (OUTPATIENT)
Dept: NEUROLOGY | Facility: CLINIC | Age: 65
End: 2020-12-29

## 2020-12-29 NOTE — TELEPHONE ENCOUNTER
Per Dr Akhil Ureña note 12/24/20 pt is okay to be off of  mg prior to procedure and is to resume as soon as possible afterwards.

## 2020-12-29 NOTE — TELEPHONE ENCOUNTER
Jessica Castillo RN  You Just now (9:00 AM)     Please see note. Pt okay to be off  mg prior to procedure.     Routing comment          Plan:   Pain management to follow, cleared for procedure from neurology  From neurological standpoint, ok for p

## 2020-12-29 NOTE — TELEPHONE ENCOUNTER
Spoke with Dr Amy Santiago nurse and patient should be off of aspirin all together 7 days. Informed pt's EC Escobar of this. Verbalized understanding, no further questions.

## 2020-12-29 NOTE — TELEPHONE ENCOUNTER
Spoke with pts EC, Leif Fermin, advised of insurance approval to proceed with procedure and is agreeable to scheduling. Patient scheduled for procedure, pre-procedure instructions reviewed. Patient prefers conscious sedation.  Reviewed sedation instructions inc Day of Procedure:   Do not eat or drink anything (including water) 8 hours prior to your procedure. ? If you take morning blood pressure medication or oral diabetic medication, please take with a small sip of water. ?  You are required to have a responsib ? Excedrin (with aspirin) 7 days  ?  Plavix (Clopidogrel)                             7 days      NSAIDs: 24 hours   Meloxicam -- Cervical procedures require 3 days    Ibuprofen (Motrin, Advil, Vicoprofen), Naproxen (Naprosyn, Aleve), Piroxcam (Feldene), Me ** TO AVOID CANCELLATION AND/OR RESCHEDULING: PLEASE CALL [facility] PRE-PROCEDURE LINE [phone] FOR DETAILED INSTRUCTIONS FIVE TO SEVEN DAYS PRIOR TO PROCEDURE**

## 2021-01-04 ENCOUNTER — LAB ENCOUNTER (OUTPATIENT)
Dept: LAB | Facility: HOSPITAL | Age: 66
End: 2021-01-04
Attending: ANESTHESIOLOGY
Payer: MEDICARE

## 2021-01-04 DIAGNOSIS — M50.30 DDD (DEGENERATIVE DISC DISEASE), CERVICAL: ICD-10-CM

## 2021-01-05 LAB — SARS-COV-2 RNA RESP QL NAA+PROBE: NOT DETECTED

## 2021-01-07 ENCOUNTER — HOSPITAL ENCOUNTER (OUTPATIENT)
Facility: HOSPITAL | Age: 66
Setting detail: HOSPITAL OUTPATIENT SURGERY
Discharge: HOME | End: 2021-01-07
Attending: ANESTHESIOLOGY | Admitting: ANESTHESIOLOGY
Payer: MEDICARE

## 2021-01-07 ENCOUNTER — APPOINTMENT (OUTPATIENT)
Dept: GENERAL RADIOLOGY | Facility: HOSPITAL | Age: 66
End: 2021-01-07
Attending: ANESTHESIOLOGY
Payer: MEDICARE

## 2021-01-07 VITALS
TEMPERATURE: 98 F | DIASTOLIC BLOOD PRESSURE: 78 MMHG | RESPIRATION RATE: 18 BRPM | SYSTOLIC BLOOD PRESSURE: 123 MMHG | OXYGEN SATURATION: 96 % | HEART RATE: 87 BPM

## 2021-01-07 DIAGNOSIS — G44.86 CERVICOGENIC HEADACHE: ICD-10-CM

## 2021-01-07 PROCEDURE — 99152 MOD SED SAME PHYS/QHP 5/>YRS: CPT | Performed by: ANESTHESIOLOGY

## 2021-01-07 PROCEDURE — 3E0U33Z INTRODUCTION OF ANTI-INFLAMMATORY INTO JOINTS, PERCUTANEOUS APPROACH: ICD-10-PCS | Performed by: ANESTHESIOLOGY

## 2021-01-07 PROCEDURE — BR141ZZ FLUOROSCOPY OF CERVICAL FACET JOINT(S) USING LOW OSMOLAR CONTRAST: ICD-10-PCS | Performed by: ANESTHESIOLOGY

## 2021-01-07 RX ORDER — SODIUM CHLORIDE 9 MG/ML
INJECTION INTRAVENOUS AS NEEDED
Status: DISCONTINUED | OUTPATIENT
Start: 2021-01-07 | End: 2021-01-07

## 2021-01-07 RX ORDER — ONDANSETRON 2 MG/ML
4 INJECTION INTRAMUSCULAR; INTRAVENOUS ONCE AS NEEDED
Status: DISCONTINUED | OUTPATIENT
Start: 2021-01-07 | End: 2021-01-07

## 2021-01-07 RX ORDER — LIDOCAINE HYDROCHLORIDE 10 MG/ML
INJECTION, SOLUTION EPIDURAL; INFILTRATION; INTRACAUDAL; PERINEURAL AS NEEDED
Status: DISCONTINUED | OUTPATIENT
Start: 2021-01-07 | End: 2021-01-07

## 2021-01-07 RX ORDER — DIPHENHYDRAMINE HYDROCHLORIDE 50 MG/ML
50 INJECTION INTRAMUSCULAR; INTRAVENOUS ONCE AS NEEDED
Status: DISCONTINUED | OUTPATIENT
Start: 2021-01-07 | End: 2021-01-07

## 2021-01-07 RX ORDER — SODIUM CHLORIDE, SODIUM LACTATE, POTASSIUM CHLORIDE, CALCIUM CHLORIDE 600; 310; 30; 20 MG/100ML; MG/100ML; MG/100ML; MG/100ML
100 INJECTION, SOLUTION INTRAVENOUS CONTINUOUS
Status: DISCONTINUED | OUTPATIENT
Start: 2021-01-07 | End: 2021-01-07

## 2021-01-07 RX ORDER — MIDAZOLAM HYDROCHLORIDE 1 MG/ML
INJECTION INTRAMUSCULAR; INTRAVENOUS AS NEEDED
Status: DISCONTINUED | OUTPATIENT
Start: 2021-01-07 | End: 2021-01-07

## 2021-01-07 RX ORDER — DEXAMETHASONE SODIUM PHOSPHATE 10 MG/ML
INJECTION, SOLUTION INTRAMUSCULAR; INTRAVENOUS AS NEEDED
Status: DISCONTINUED | OUTPATIENT
Start: 2021-01-07 | End: 2021-01-07

## 2021-01-07 NOTE — H&P
History & Physical Examination    Patient Name: Jaimee Sin  MRN: [id number]  CSN: [id number]  YOB: 1955    Pre-Operative Diagnosis:  Cervicogenic headache [R51.9]    Present Illness: Patient with cervicogenic headache for radiofrequ (impaired glucose tolerance) 6/9/2015   • AMALIA on CPAP 4/30/2015   • Other and unspecified hyperlipidemia    • RLS (restless legs syndrome) 12/3/2015   • [facility]     8/2017     Past Surgical History:   Procedure Laterality Date   • ANGIOGRAM     • BIOP

## 2021-01-07 NOTE — OPERATIVE REPORT
[facility]  Operative Report  2021     Micah Flores Patient Status:  Hospital Outpatient Surgery    1955 MRN [id number]   [facility] ENDOSCOPY Attending Julián Motnague MD   Hosp Day # 0 PCP Quin Claude, MD left C4 level. The needle was positioned in an orientation tangential to the ventral aspect of the articular pillar. The needle position was confirmed in AP and lateral views.   Sensory and motor stimulation were then performed, which elicited deep left n

## 2021-01-11 ENCOUNTER — HOSPITAL ENCOUNTER (OUTPATIENT)
Dept: CV DIAGNOSTICS | Facility: HOSPITAL | Age: 66
Discharge: HOME | End: 2021-01-11
Attending: INTERNAL MEDICINE
Payer: MEDICARE

## 2021-01-11 DIAGNOSIS — G45.9 TIA (TRANSIENT ISCHEMIC ATTACK): ICD-10-CM

## 2021-01-11 DIAGNOSIS — I63.9 CEREBROVASCULAR ACCIDENT (CVA), UNSPECIFIED MECHANISM (HCC): ICD-10-CM

## 2021-01-11 PROCEDURE — 93271 ECG/MONITORING AND ANALYSIS: CPT | Performed by: INTERNAL MEDICINE

## 2021-01-11 PROCEDURE — 93228 REMOTE 30 DAY ECG REV/REPORT: CPT | Performed by: INTERNAL MEDICINE

## 2021-01-12 ENCOUNTER — TELEPHONE (OUTPATIENT)
Dept: SURGERY | Facility: CLINIC | Age: 66
End: 2021-01-12

## 2021-01-12 DIAGNOSIS — I10 ESSENTIAL HYPERTENSION: Primary | ICD-10-CM

## 2021-01-12 RX ORDER — METOPROLOL SUCCINATE 25 MG/1
25 TABLET, EXTENDED RELEASE ORAL DAILY
Qty: 90 TABLET | Refills: 0 | Status: SHIPPED | OUTPATIENT
Start: 2021-01-12 | End: 2021-01-21

## 2021-01-12 NOTE — TELEPHONE ENCOUNTER
Spoke to pt's Cherrie Osei who states pt is still in pain. Martinez Cruise that it typically takes approx 3 weeks for RFA to take effect. Asked if pt is still taking gabapentin and duloxetine. Cherrie Osei states pt has not seen much relief with these medications.  I

## 2021-01-12 NOTE — TELEPHONE ENCOUNTER
Pt's cousin states inj from 1/7/21 has not helped pt with headaches. pt has gotten any relief. Pls advise.

## 2021-01-15 RX ORDER — FENOFIBRATE 145 MG/1
145 TABLET, COATED ORAL DAILY
COMMUNITY
Start: 2020-12-11 | End: 2022-09-21 | Stop reason: ALTCHOICE

## 2021-01-15 RX ORDER — ROPINIROLE 0.5 MG/1
TABLET, FILM COATED ORAL
COMMUNITY
Start: 2018-03-20 | End: 2021-01-18 | Stop reason: ALTCHOICE

## 2021-01-15 RX ORDER — LISINOPRIL 10 MG/1
TABLET ORAL
COMMUNITY
Start: 2015-02-24 | End: 2022-09-21 | Stop reason: ALTCHOICE

## 2021-01-15 RX ORDER — TAMSULOSIN HYDROCHLORIDE 0.4 MG/1
0.4 CAPSULE ORAL DAILY
COMMUNITY
Start: 2019-11-24

## 2021-01-15 RX ORDER — ASPIRIN 325 MG
325 TABLET ORAL DAILY
Status: ON HOLD | COMMUNITY
Start: 2020-11-25 | End: 2023-05-16 | Stop reason: HOSPADM

## 2021-01-15 RX ORDER — DULOXETIN HYDROCHLORIDE 60 MG/1
60 CAPSULE, DELAYED RELEASE ORAL DAILY
Status: ON HOLD | COMMUNITY
Start: 2020-11-20 | End: 2023-05-15

## 2021-01-15 RX ORDER — ATORVASTATIN CALCIUM 80 MG/1
TABLET, FILM COATED ORAL
COMMUNITY
Start: 2020-12-07 | End: 2022-09-21 | Stop reason: ALTCHOICE

## 2021-01-15 RX ORDER — PRAVASTATIN SODIUM 20 MG
TABLET ORAL
COMMUNITY
Start: 2015-02-24 | End: 2021-01-15 | Stop reason: ALTCHOICE

## 2021-01-15 RX ORDER — GABAPENTIN 100 MG/1
100 CAPSULE ORAL NIGHTLY
COMMUNITY
Start: 2020-12-21 | End: 2021-01-18

## 2021-01-15 RX ORDER — METOPROLOL SUCCINATE 25 MG/1
25 TABLET, EXTENDED RELEASE ORAL AT BEDTIME
COMMUNITY
Start: 2021-01-12

## 2021-01-18 ENCOUNTER — OFFICE VISIT (OUTPATIENT)
Dept: CARDIOLOGY | Age: 66
End: 2021-01-18

## 2021-01-18 VITALS
BODY MASS INDEX: 34.86 KG/M2 | HEART RATE: 88 BPM | DIASTOLIC BLOOD PRESSURE: 60 MMHG | WEIGHT: 216 LBS | SYSTOLIC BLOOD PRESSURE: 110 MMHG

## 2021-01-18 DIAGNOSIS — I65.29 STENOSIS OF CAROTID ARTERY, UNSPECIFIED LATERALITY: ICD-10-CM

## 2021-01-18 DIAGNOSIS — I25.10 ATHEROSCLEROSIS OF NATIVE CORONARY ARTERY WITHOUT ANGINA PECTORIS, UNSPECIFIED WHETHER NATIVE OR TRANSPLANTED HEART: Primary | ICD-10-CM

## 2021-01-18 DIAGNOSIS — R07.9 CHEST PAIN, UNSPECIFIED TYPE: ICD-10-CM

## 2021-01-18 DIAGNOSIS — I63.9 CEREBELLAR STROKE (CMD): ICD-10-CM

## 2021-01-18 DIAGNOSIS — E78.2 MIXED HYPERLIPIDEMIA: ICD-10-CM

## 2021-01-18 PROCEDURE — 93000 ELECTROCARDIOGRAM COMPLETE: CPT | Performed by: INTERNAL MEDICINE

## 2021-01-18 PROCEDURE — 99215 OFFICE O/P EST HI 40 MIN: CPT | Performed by: INTERNAL MEDICINE

## 2021-01-18 ASSESSMENT — PATIENT HEALTH QUESTIONNAIRE - PHQ9
SUM OF ALL RESPONSES TO PHQ9 QUESTIONS 1 AND 2: 0
1. LITTLE INTEREST OR PLEASURE IN DOING THINGS: NOT AT ALL
SUM OF ALL RESPONSES TO PHQ9 QUESTIONS 1 AND 2: 0
2. FEELING DOWN, DEPRESSED OR HOPELESS: NOT AT ALL
CLINICAL INTERPRETATION OF PHQ9 SCORE: NO FURTHER SCREENING NEEDED
CLINICAL INTERPRETATION OF PHQ2 SCORE: NO FURTHER SCREENING NEEDED

## 2021-01-18 ASSESSMENT — ENCOUNTER SYMPTOMS
SUSPICIOUS LESIONS: 0
HEMATOCHEZIA: 0
BRUISES/BLEEDS EASILY: 0
COUGH: 0
HEMOPTYSIS: 0
WEIGHT GAIN: 0
FEVER: 0
CHILLS: 0
WEIGHT LOSS: 0
ALLERGIC/IMMUNOLOGIC COMMENTS: NO NEW FOOD ALLERGIES

## 2021-01-21 ENCOUNTER — OFFICE VISIT (OUTPATIENT)
Dept: INTERNAL MEDICINE CLINIC | Facility: CLINIC | Age: 66
End: 2021-01-21
Payer: MEDICARE

## 2021-01-21 VITALS
WEIGHT: 222 LBS | HEART RATE: 51 BPM | HEIGHT: 66 IN | OXYGEN SATURATION: 98 % | DIASTOLIC BLOOD PRESSURE: 68 MMHG | SYSTOLIC BLOOD PRESSURE: 114 MMHG | TEMPERATURE: 97 F | BODY MASS INDEX: 35.68 KG/M2 | RESPIRATION RATE: 14 BRPM

## 2021-01-21 DIAGNOSIS — Z11.59 NEED FOR HEPATITIS C SCREENING TEST: ICD-10-CM

## 2021-01-21 DIAGNOSIS — G47.33 OSA (OBSTRUCTIVE SLEEP APNEA): ICD-10-CM

## 2021-01-21 DIAGNOSIS — I48.0 PAROXYSMAL ATRIAL FIBRILLATION (HCC): ICD-10-CM

## 2021-01-21 DIAGNOSIS — I25.10 CORONARY ARTERY DISEASE INVOLVING NATIVE CORONARY ARTERY OF NATIVE HEART WITHOUT ANGINA PECTORIS: ICD-10-CM

## 2021-01-21 DIAGNOSIS — N40.0 BENIGN PROSTATIC HYPERPLASIA, UNSPECIFIED WHETHER LOWER URINARY TRACT SYMPTOMS PRESENT: ICD-10-CM

## 2021-01-21 DIAGNOSIS — I10 ESSENTIAL HYPERTENSION: ICD-10-CM

## 2021-01-21 DIAGNOSIS — N18.31 STAGE 3A CHRONIC KIDNEY DISEASE (HCC): ICD-10-CM

## 2021-01-21 DIAGNOSIS — R25.3 BENIGN FASCICULATION-CRAMP SYNDROME: ICD-10-CM

## 2021-01-21 DIAGNOSIS — Z00.00 ENCOUNTER FOR ANNUAL HEALTH EXAMINATION: Primary | ICD-10-CM

## 2021-01-21 DIAGNOSIS — I65.01 OCCLUSION OF RIGHT VERTEBRAL ARTERY: ICD-10-CM

## 2021-01-21 DIAGNOSIS — R73.02 IGT (IMPAIRED GLUCOSE TOLERANCE): ICD-10-CM

## 2021-01-21 DIAGNOSIS — G25.81 RLS (RESTLESS LEGS SYNDROME): ICD-10-CM

## 2021-01-21 DIAGNOSIS — E78.5 DYSLIPIDEMIA: ICD-10-CM

## 2021-01-21 DIAGNOSIS — Z86.73 HISTORY OF STROKE: ICD-10-CM

## 2021-01-21 DIAGNOSIS — R42 DIZZINESS AND GIDDINESS: ICD-10-CM

## 2021-01-21 DIAGNOSIS — G44.86 CERVICOGENIC HEADACHE: ICD-10-CM

## 2021-01-21 DIAGNOSIS — Z12.11 SCREENING FOR COLON CANCER: ICD-10-CM

## 2021-01-21 PROBLEM — R26.9 GAIT DISORDER: Status: RESOLVED | Noted: 2019-11-13 | Resolved: 2021-01-21

## 2021-01-21 PROBLEM — M54.81 OCCIPITAL NEURALGIA: Status: RESOLVED | Noted: 2019-04-03 | Resolved: 2021-01-21

## 2021-01-21 PROBLEM — R41.82 ALTERED MENTAL STATUS: Status: RESOLVED | Noted: 2020-11-22 | Resolved: 2021-01-21

## 2021-01-21 PROCEDURE — 96160 PT-FOCUSED HLTH RISK ASSMT: CPT | Performed by: INTERNAL MEDICINE

## 2021-01-21 PROCEDURE — 3074F SYST BP LT 130 MM HG: CPT | Performed by: INTERNAL MEDICINE

## 2021-01-21 PROCEDURE — G0402 INITIAL PREVENTIVE EXAM: HCPCS | Performed by: INTERNAL MEDICINE

## 2021-01-21 PROCEDURE — 3078F DIAST BP <80 MM HG: CPT | Performed by: INTERNAL MEDICINE

## 2021-01-21 PROCEDURE — 3008F BODY MASS INDEX DOCD: CPT | Performed by: INTERNAL MEDICINE

## 2021-01-21 PROCEDURE — 99396 PREV VISIT EST AGE 40-64: CPT | Performed by: INTERNAL MEDICINE

## 2021-01-21 RX ORDER — ATORVASTATIN CALCIUM 80 MG/1
80 TABLET, FILM COATED ORAL NIGHTLY
Qty: 90 TABLET | Refills: 3 | Status: SHIPPED | OUTPATIENT
Start: 2021-01-21 | End: 2021-08-26

## 2021-01-21 RX ORDER — LISINOPRIL 10 MG/1
10 TABLET ORAL DAILY
Qty: 90 TABLET | Refills: 3 | Status: ON HOLD | OUTPATIENT
Start: 2021-01-21 | End: 2021-05-22

## 2021-01-21 RX ORDER — ROPINIROLE 1 MG/1
1 TABLET, FILM COATED ORAL 2 TIMES DAILY
Qty: 180 TABLET | Refills: 3 | Status: SHIPPED | OUTPATIENT
Start: 2021-01-21 | End: 2021-08-26

## 2021-01-21 RX ORDER — METOPROLOL SUCCINATE 25 MG/1
25 TABLET, EXTENDED RELEASE ORAL DAILY
Qty: 90 TABLET | Refills: 3 | Status: SHIPPED | OUTPATIENT
Start: 2021-01-21 | End: 2021-08-26

## 2021-01-21 NOTE — PROGRESS NOTES
HPI:   Marcela Ziegler is a 60year old male who presents for a MA (Medicare Advantage) Supervisit (Once per calendar year). appt last month:  HPI: accompanied by cousin Blas Ty today  Pt is becoming increasing forgetful, confused and stubborn.  He 7    Increasing activity, increasing vitamin D, and/or physical therapy are recommended by the USPSTF and we discussed options, see handouts.     Do you have 3 or more medical conditions?: (P) 1-Yes  Have you fallen in the last 12 months?: (P) 1-Yes  Do you Difficulty walking?: (P) Yes   He has problems with Daily Activities based on screening of functional status. Problems with daily activities? : (P) Yes   He has problems with Memory based on screening of functional status.    Memory Problems?: (P) Yes RLS (restless legs syndrome)     Benign fasciculation-cramp syndrome     AMALIA (obstructive sleep apnea)     Occlusion of right vertebral artery     CKD (chronic kidney disease), stage III     Memory deficit     Dizziness and giddiness     History of stroke atherosclerosis, Diverticulitis, High blood pressure, HTN (hypertension), IGT (impaired glucose tolerance) (6/9/2015), AMALIA on CPAP (4/30/2015), Other and unspecified hyperlipidemia, RLS (restless legs syndrome) (12/3/2015), and Stroke ([facility]     He  has a p murmur, click, rub or gallop, regular rate and rhythm  Abdomen: soft, non-tender; bowel sounds normal; no masses,  no organomegaly  Extremities: extremities normal, atraumatic, no cyanosis or edema     Vaccination History     Immunization History   Adminis Diet assessment: good     PLAN:  The patient indicates understanding of these issues and agrees to the plan. Reinforced healthy diet, lifestyle, and exercise.     Return in about 6 months (around 7/21/2021) for multiple issues, extended 40 minute visit Glaucoma, AA>50, > 65 No flowsheet data found.     Prostate Cancer Screening      PSA  Annually PSA due on 02/12/2021  Update Health Maintenance if applicable     Immunizations (Update Immunization Activity if applicable)     Influenza  Covered Anabella

## 2021-01-21 NOTE — PATIENT INSTRUCTIONS
Lori Zamora's SCREENING SCHEDULE   Tests on this list are recommended by your physician but may not be covered, or covered at this frequency, by your insurer. Please check with your insurance carrier before scheduling to verify coverage.     PRE found for this or any previous visit.  Limited to patients who meet one of the following criteria:   • Men who are 73-68 years old and have smoked more than 100 cigarettes in their lifetime   • Anyone with a family history    Colorectal Cancer Screening Cov retarded   Persons who live in the same house as a HepB virus carrier   Homosexual men   Illicit injectable drug abusers     Tetanus Toxoid- Only covered with a cut with metal- TD and TDaP Not covered by Medicare Part B) No orders found for this or any pre

## 2021-02-01 NOTE — TELEPHONE ENCOUNTER
Pt's cousin stating headaches still have not improved, if anything getting worse. had a fall the other day, dizziness.  Requesting to speak with a nurse about next steps

## 2021-02-01 NOTE — TELEPHONE ENCOUNTER
Live Duran MD  You 8 minutes ago (12:56 PM)     Dizziness not related to RFA. Has had difficulty with procedural management of his symptoms. Would not recommend repeat procedure.  If not improved can increase his gabapentin if not cognitively impaired

## 2021-02-01 NOTE — TELEPHONE ENCOUNTER
Spoke to Spenser Hobson who states HA's are worse since RFA on 1/7/2021, eyes water because pain is increased, experiencing dizziness. Pt has been taking Tylenol and one gummy, which helps a bit.  Spenser Hobson reports that pt fell in his bathroom a couple of days ago d

## 2021-02-08 ENCOUNTER — OFFICE VISIT (OUTPATIENT)
Dept: PAIN CLINIC | Facility: CLINIC | Age: 66
End: 2021-02-08
Payer: MEDICARE

## 2021-02-08 VITALS
HEART RATE: 58 BPM | DIASTOLIC BLOOD PRESSURE: 64 MMHG | WEIGHT: 222 LBS | BODY MASS INDEX: 36 KG/M2 | OXYGEN SATURATION: 97 % | RESPIRATION RATE: 16 BRPM | SYSTOLIC BLOOD PRESSURE: 118 MMHG

## 2021-02-08 DIAGNOSIS — G44.86 CERVICOGENIC HEADACHE: Primary | ICD-10-CM

## 2021-02-08 DIAGNOSIS — Z96.652 HISTORY OF LEFT KNEE REPLACEMENT: ICD-10-CM

## 2021-02-08 PROBLEM — E78.2 MIXED HYPERLIPIDEMIA: Status: ACTIVE | Noted: 2021-01-18

## 2021-02-08 PROBLEM — I65.29 STENOSIS OF CAROTID ARTERY: Status: ACTIVE | Noted: 2021-01-18

## 2021-02-08 PROBLEM — R07.9 CHEST PAIN: Status: ACTIVE | Noted: 2021-01-18

## 2021-02-08 PROBLEM — I63.9 CEREBELLAR STROKE (HCC): Status: ACTIVE | Noted: 2017-08-21

## 2021-02-08 PROCEDURE — 99214 OFFICE O/P EST MOD 30 MIN: CPT | Performed by: ANESTHESIOLOGY

## 2021-02-08 PROCEDURE — 3078F DIAST BP <80 MM HG: CPT | Performed by: ANESTHESIOLOGY

## 2021-02-08 PROCEDURE — 3074F SYST BP LT 130 MM HG: CPT | Performed by: ANESTHESIOLOGY

## 2021-02-08 RX ORDER — ACETAMINOPHEN AND CODEINE PHOSPHATE 300; 30 MG/1; MG/1
1 TABLET ORAL EVERY 6 HOURS PRN
Qty: 28 TABLET | Refills: 0 | Status: SHIPPED | OUTPATIENT
Start: 2021-02-08 | End: 2021-08-26

## 2021-02-08 NOTE — PROGRESS NOTES
Name: Alicja Mensah   :    DOS: 2021     Pain Clinic Follow Up Visit:   Patient presents with:   Follow - Up      Alicja Mensah is a 60year old male with a history of stroke and chronic neck pain following up after radiofreq concentration & attention span intact. Inspection:  Ambulates with well-coordinated, fluid, non-antalgic gait.   Gait is normal.  Neck: Upper extremity range of motion preserved  Back: Gait is intact    IMAGES:     No new imaging    ASSESSMENT AND PLAN: THERAPY & REHAB  ORTHOPEDIC - INTERNAL  The patient indicates understanding of these issues and agrees to the plan. No follow-ups on file.     Wagner Miller MD, 2/8/2021, 3:38 PM

## 2021-02-08 NOTE — PROGRESS NOTES
Last procedure: RADIOFREQUENCY DENERVATION OF LEFT CERVICAL MEDIAL BRANCHES WITH SEDATION  Date: 01/07/2021  Percentage of relief obtained: 0%      Patient presents in office today with reported pain in neck, radiates up to the head,and behind the eyes  Cu

## 2021-02-10 ENCOUNTER — TELEPHONE (OUTPATIENT)
Dept: ORTHOPEDICS CLINIC | Facility: CLINIC | Age: 66
End: 2021-02-10

## 2021-02-10 DIAGNOSIS — M25.562 LEFT KNEE PAIN, UNSPECIFIED CHRONICITY: Primary | ICD-10-CM

## 2021-02-10 NOTE — TELEPHONE ENCOUNTER
Patient is coming in for left knee pain associated with replacement - done years ago with no records.

## 2021-02-11 DIAGNOSIS — M54.2 NECK PAIN: ICD-10-CM

## 2021-02-11 RX ORDER — DULOXETIN HYDROCHLORIDE 60 MG/1
CAPSULE, DELAYED RELEASE ORAL
Qty: 30 CAPSULE | Refills: 0 | Status: SHIPPED | OUTPATIENT
Start: 2021-02-11 | End: 2021-03-18

## 2021-02-11 NOTE — TELEPHONE ENCOUNTER
Medication: DULoxetine HCl 60 MG Oral Cap DR La    Date of last refill: 11/20/2020  [place]    Last office visit: 02/08/2021  Due back to clinic per last office note:  N/a  Date next office visit scheduled:  n orders of the defined types were placed in this encounter.           Radiology orders and consultations:OP REFERRAL TO [address] [place] [address] - INTERNAL  The patient indicates understanding of these issues and agrees to the plan.   No f

## 2021-02-17 ENCOUNTER — OFFICE VISIT (OUTPATIENT)
Dept: ORTHOPEDICS CLINIC | Facility: CLINIC | Age: 66
End: 2021-02-17
Payer: MEDICARE

## 2021-02-17 ENCOUNTER — HOSPITAL ENCOUNTER (OUTPATIENT)
Dept: GENERAL RADIOLOGY | Age: 66
Discharge: HOME | End: 2021-02-17
Attending: ORTHOPAEDIC SURGERY
Payer: MEDICARE

## 2021-02-17 VITALS — HEART RATE: 76 BPM | OXYGEN SATURATION: 98 %

## 2021-02-17 DIAGNOSIS — Z96.652 PAIN DUE TO TOTAL LEFT KNEE REPLACEMENT, INITIAL ENCOUNTER (HCC): Primary | ICD-10-CM

## 2021-02-17 DIAGNOSIS — T84.84XA PAIN DUE TO TOTAL LEFT KNEE REPLACEMENT, INITIAL ENCOUNTER (HCC): Primary | ICD-10-CM

## 2021-02-17 DIAGNOSIS — M23.52 CHRONIC INSTABILITY OF LEFT KNEE: ICD-10-CM

## 2021-02-17 DIAGNOSIS — M25.562 LEFT KNEE PAIN, UNSPECIFIED CHRONICITY: ICD-10-CM

## 2021-02-17 PROCEDURE — 73562 X-RAY EXAM OF KNEE 3: CPT | Performed by: ORTHOPAEDIC SURGERY

## 2021-02-17 PROCEDURE — 99203 OFFICE O/P NEW LOW 30 MIN: CPT | Performed by: ORTHOPAEDIC SURGERY

## 2021-02-17 NOTE — H&P
[facility] New Patient Note    CC: Patient presents with:  Knee Pain: left kneee pain , previous knee replacement in this knee       HPI: This 60year old male presents today with complaints of left knee pain.   Patient has difficulty conveying his hi 9/19/2014   • CAD, multiple vessel 2005,7,12    nonocclusive   • Coronary atherosclerosis    • Diverticulitis    • High blood pressure    • HTN (hypertension)    • IGT (impaired glucose tolerance) 6/9/2015   • AMALIA on CPAP 4/30/2015   • Other and unspecifie daily. 90 capsule 0   • aspirin 325 MG Oral Tab Take 1 tablet (325 mg total) by mouth daily.  90 tablet 0     No Known Allergies  Family History   Problem Relation Age of Onset   • Heart Attack Mother    • Heart Attack Father    • Cancer Sister    • Heart A less than 5 mm. There is a palpable click with the post with posterior drawer at 90 degrees. In mid flexion, there are a few millimeters of laxity to both varus and valgus. Lachman's is stable less than 5 mm.   • Neuromuscular: 4+ out of 5 quad strength with some residual weakness. Additionally patient has notable atrophy to the left lower extremity musculature.   I discussed with the patient my recommendations for initiation of physical therapy to help strengthen the lower extremity, with the [facility] [place]

## 2021-02-20 DIAGNOSIS — M54.2 NECK PAIN: ICD-10-CM

## 2021-02-22 RX ORDER — DULOXETIN HYDROCHLORIDE 60 MG/1
CAPSULE, DELAYED RELEASE ORAL
Qty: 30 CAPSULE | Refills: 0 | OUTPATIENT
Start: 2021-02-22

## 2021-03-09 ENCOUNTER — OFFICE VISIT (OUTPATIENT)
Dept: PHYSICAL THERAPY | Age: 66
End: 2021-03-09
Attending: ANESTHESIOLOGY
Payer: MEDICARE

## 2021-03-09 DIAGNOSIS — G44.86 CERVICOGENIC HEADACHE: ICD-10-CM

## 2021-03-09 PROCEDURE — 97162 PT EVAL MOD COMPLEX 30 MIN: CPT

## 2021-03-10 NOTE — PROGRESS NOTES
SPINE EVALUATION:   Referring Physician: Dr. Angelika Dempsey  Diagnosis: Cervical Spine Dysfunction     Date of Service: 3/10/2021     PATIENT SUMMARY   Darian Meneses is a 60year old male who presents to therapy today with complaints of chronic neck pain, a diplopia, dysarthria, dysphasia, dizziness, drop attacks, bowel/bladder changes, saddle anesthesia, and GIOVANI LE N/T.    Feliciano Vasquez presents to physical therapy evaluation with primary complaints of chronic neck pain, audible crepitus and \"lona serratus anterior < 3/5 on L, 3+/5 on R    Cervical flexors < 3/5    Decreased pectoralis and latissimus dorsi muscle length       Special tests: negative UE neural tension test median nerve    Today’s Treatment and Response:   Pt education was provided on you have any questions, please contact me at Dept: [phone]    Sincerely,  Electronically signed by therapist: Sheila Shelby PT    [de-identified] certification required: Yes  I certify the need for these services furnished under this plan of treatmen

## 2021-03-16 ENCOUNTER — OFFICE VISIT (OUTPATIENT)
Dept: PHYSICAL THERAPY | Age: 66
End: 2021-03-16
Attending: ANESTHESIOLOGY
Payer: MEDICARE

## 2021-03-16 PROCEDURE — 97110 THERAPEUTIC EXERCISES: CPT

## 2021-03-16 NOTE — PROGRESS NOTES
Diagnosis: cervical spine dysfunction  Upper quarter weakness   [facility] 4[facility]20/[facility]21    Treatment Number: 8 sessions until 4/20/2021    Subjective: Complaints of chronic neck pain, audible crepitus and \"p neck pain or difficulty. -Improve cervical strength as noted with supine cervical flexion test performed for 20 seconds or > reduce FHP and neck pain.   -Improve middle and lower trapezius MMT globally to 4/5 so pt can reach above head to place and retreiv

## 2021-03-17 ENCOUNTER — OFFICE VISIT (OUTPATIENT)
Dept: PHYSICAL THERAPY | Age: 66
End: 2021-03-17
Attending: ANESTHESIOLOGY
Payer: MEDICARE

## 2021-03-17 PROCEDURE — 97110 THERAPEUTIC EXERCISES: CPT

## 2021-03-17 NOTE — PROGRESS NOTES
Diagnosis: cervical spine dysfunction  Upper quarter weakness   [facility] 4/20/[facility]    Treatment Number: 8 sessions until 4/20/2021    Subjective: No adverse symptoms after yesterdays tx, no complaints. with little to no neck pain or difficulty. -Improve cervical strength as noted with supine cervical flexion test performed for 20 seconds or > reduce FHP and neck pain.   -Improve middle and lower trapezius MMT globally to 4/5 so pt can reach above head to

## 2021-03-18 ENCOUNTER — APPOINTMENT (OUTPATIENT)
Dept: PHYSICAL THERAPY | Age: 66
End: 2021-03-18
Attending: ANESTHESIOLOGY
Payer: MEDICARE

## 2021-03-18 DIAGNOSIS — M54.2 NECK PAIN: ICD-10-CM

## 2021-03-19 RX ORDER — DULOXETIN HYDROCHLORIDE 60 MG/1
60 CAPSULE, DELAYED RELEASE ORAL DAILY
Qty: 30 CAPSULE | Refills: 0 | Status: SHIPPED | OUTPATIENT
Start: 2021-03-19 | End: 2021-04-28

## 2021-03-20 DIAGNOSIS — Z23 NEED FOR VACCINATION: ICD-10-CM

## 2021-03-21 ENCOUNTER — IMMUNIZATION (OUTPATIENT)
Dept: LAB | Age: 66
End: 2021-03-21
Attending: HOSPITALIST
Payer: MEDICARE

## 2021-03-21 DIAGNOSIS — Z23 NEED FOR VACCINATION: Primary | ICD-10-CM

## 2021-03-21 PROCEDURE — 0001A SARSCOV2 VAC 30MCG/0.3ML IM: CPT

## 2021-03-29 ENCOUNTER — OFFICE VISIT (OUTPATIENT)
Dept: PHYSICAL THERAPY | Age: 66
End: 2021-03-29
Attending: ANESTHESIOLOGY
Payer: MEDICARE

## 2021-03-29 PROCEDURE — 97110 THERAPEUTIC EXERCISES: CPT

## 2021-03-29 NOTE — PROGRESS NOTES
Diagnosis: cervical spine dysfunction  Upper quarter weakness   Insurance Type (# Auth): medicare [employer] O 8 sessions until 4/20/2021    Treatment Number: 4 of 8 sessions until 4/20/2021    Subjective: Pt reports neck pain has been little to none since l cervical extension AROM to 60 or > so pt can look up into cabinets with little to no neck pain or difficulty. -Improve cervical strength as noted with supine cervical flexion test performed for 20 seconds or > reduce FHP and neck pain.   -Improve middle an

## 2021-03-31 ENCOUNTER — OFFICE VISIT (OUTPATIENT)
Dept: PHYSICAL THERAPY | Age: 66
End: 2021-03-31
Attending: ANESTHESIOLOGY
Payer: MEDICARE

## 2021-03-31 PROCEDURE — 97110 THERAPEUTIC EXERCISES: CPT

## 2021-03-31 NOTE — PROGRESS NOTES
Diagnosis: cervical spine dysfunction  Upper quarter weakness   Insurance Type (# Auth): medicare human[employer] O 8 sessions until 4/20/2021    Treatment Number: 6 of 8 sessions until 4/20/2021    Subjective:  Reports neck pain is well improved, pleased with cu retreive items.  -Independent with progressive HEP. Plan: check  HEP and impingement. Check cervical ROM. Progress accordingly.       Total Timed Treatment: 40 min  Total Treatment time: 40 min  Charges: TE 3

## 2021-04-05 ENCOUNTER — OFFICE VISIT (OUTPATIENT)
Dept: PHYSICAL THERAPY | Age: 66
End: 2021-04-05
Attending: ANESTHESIOLOGY
Payer: MEDICARE

## 2021-04-05 PROCEDURE — 97110 THERAPEUTIC EXERCISES: CPT

## 2021-04-05 NOTE — PROGRESS NOTES
Diagnosis: cervical spine dysfunction  Upper quarter weakness   Insurance Type (# Auth): medicare human[employer] O 8 sessions until 4/20/2021    Treatment Number: 7  of 8 sessions until 4/20/2021    Subjective:  Overall neck pain is sporadic, has catching once i head with daily activities with little to no pain or difficulty. -Improve cervical extension AROM to 60 or > so pt can look up into cabinets with little to no neck pain or difficulty.   -Improve cervical strength as noted with supine cervical flexion test

## 2021-04-07 ENCOUNTER — OFFICE VISIT (OUTPATIENT)
Dept: PHYSICAL THERAPY | Age: 66
End: 2021-04-07
Attending: ANESTHESIOLOGY
Payer: MEDICARE

## 2021-04-07 PROCEDURE — 97110 THERAPEUTIC EXERCISES: CPT

## 2021-04-07 NOTE — PROGRESS NOTES
Diagnosis: cervical spine dysfunction  Upper quarter weakness   Insurance Type (# Auth): medicare human[employer] O 8 sessions until 4/20/2021    Treatment Number: 8  of 8 sessions until 4/20/2021    Subjective:  Overall neck pain is sporadic, has catching once i WNL)         Goals: (to be met in 8 visits, HMO)   -Improve cervical rotation AROM to 75 or > each side so pt can turn head with daily activities with little to no pain or difficulty.    -Improve cervical extension AROM to 60 or > so pt can look up into cab

## 2021-04-11 ENCOUNTER — IMMUNIZATION (OUTPATIENT)
Dept: LAB | Age: 66
End: 2021-04-11
Attending: HOSPITALIST
Payer: MEDICARE

## 2021-04-11 DIAGNOSIS — Z23 NEED FOR VACCINATION: Primary | ICD-10-CM

## 2021-04-11 PROCEDURE — 0002A SARSCOV2 VAC 30MCG/0.3ML IM: CPT

## 2021-04-12 ENCOUNTER — OFFICE VISIT (OUTPATIENT)
Dept: PHYSICAL THERAPY | Age: 66
End: 2021-04-12
Attending: ANESTHESIOLOGY
Payer: MEDICARE

## 2021-04-12 PROCEDURE — 97161 PT EVAL LOW COMPLEX 20 MIN: CPT

## 2021-04-12 NOTE — PROGRESS NOTES
LOWER EXTREMITY EVALUATION:   Referring Physician: Dr. Erich Heath  Diagnosis:  L knee dysfunction/instability     Date of Service: 4/12/2021     PATIENT SUMMARY   Cleveland Griffith is a 60year old male who presents to therapy today with complain years. Has history of TKA 40+ years ago. The results of the objective tests and measures show slight loss of knee extension PROM and AROM, moderate loss of flexion PROM and AROM. Decreased quadriceps function with poor TKE during swing phase of gait.  Jerry Garay mechanics as noted with n hip circumduction during swing phase.   -Pain with walking decreased by 25%  -Independent with progressive HEP. Frequency / Duration: Patient will be seen for 1-2 x/week or a total of 8 tx visits over a 90 day period.  Treatme

## 2021-04-14 ENCOUNTER — OFFICE VISIT (OUTPATIENT)
Dept: PHYSICAL THERAPY | Age: 66
End: 2021-04-14
Attending: ANESTHESIOLOGY
Payer: MEDICARE

## 2021-04-14 PROCEDURE — 97140 MANUAL THERAPY 1/> REGIONS: CPT

## 2021-04-14 PROCEDURE — 97110 THERAPEUTIC EXERCISES: CPT

## 2021-04-14 NOTE — PROGRESS NOTES
Diagnosis: L knee instability      Insurance Type (# Auth): [employer] medicare          Treatment Number: 2    Subjective: Pt has few year hx of knee instability and pain. Had TKA 40 years ago. Objective:     Man PT:  10 min  -patella mobes all 4 glides  -

## 2021-04-19 ENCOUNTER — APPOINTMENT (OUTPATIENT)
Dept: PHYSICAL THERAPY | Age: 66
End: 2021-04-19
Attending: ANESTHESIOLOGY
Payer: MEDICARE

## 2021-04-21 ENCOUNTER — APPOINTMENT (OUTPATIENT)
Dept: PHYSICAL THERAPY | Age: 66
End: 2021-04-21
Attending: ANESTHESIOLOGY
Payer: MEDICARE

## 2021-04-21 ENCOUNTER — TELEPHONE (OUTPATIENT)
Dept: SURGERY | Facility: CLINIC | Age: 66
End: 2021-04-21

## 2021-04-21 DIAGNOSIS — M54.2 NECK PAIN: ICD-10-CM

## 2021-04-21 NOTE — TELEPHONE ENCOUNTER
Shelby Corbett MD  You 57 minutes ago (12:05 PM)     I don't think I increased his duloxetine dose. How is he doing with it? See [address] from today as well. Sent  msg back requesting further information.

## 2021-04-21 NOTE — TELEPHONE ENCOUNTER
Pharmacy calling requesting Dulocetine. Pharmacy stated that daughter told them he is taking med twice a day. If that's the case they need a new Rx.

## 2021-04-22 ENCOUNTER — TELEPHONE (OUTPATIENT)
Dept: ORTHOPEDICS CLINIC | Facility: CLINIC | Age: 66
End: 2021-04-22

## 2021-04-22 NOTE — TELEPHONE ENCOUNTER
Patient's cousin Clive Alex called (she has verbal consent) regarding Carlos's custom brace from [place] [id number]. She states Vinicius has contacted us several times regarding the codes for the custom brace. [employer] will not pay based on current codes.  New fax

## 2021-04-26 ENCOUNTER — OFFICE VISIT (OUTPATIENT)
Dept: PHYSICAL THERAPY | Age: 66
End: 2021-04-26
Attending: INTERNAL MEDICINE
Payer: MEDICARE

## 2021-04-26 PROCEDURE — 97140 MANUAL THERAPY 1/> REGIONS: CPT

## 2021-04-26 PROCEDURE — 97110 THERAPEUTIC EXERCISES: CPT

## 2021-04-26 NOTE — PROGRESS NOTES
Diagnosis: L knee instability      Insurance Type (# Auth): [employer] medicare          Treatment Number: 3    Subjective: Pt reports he has been ut of town and didn't have chance for any HEP since last seen. Has daily pain at L knee that limits mobility.

## 2021-04-28 ENCOUNTER — OFFICE VISIT (OUTPATIENT)
Dept: PHYSICAL THERAPY | Age: 66
End: 2021-04-28
Attending: INTERNAL MEDICINE
Payer: MEDICARE

## 2021-04-28 PROCEDURE — 97110 THERAPEUTIC EXERCISES: CPT

## 2021-04-28 PROCEDURE — 97140 MANUAL THERAPY 1/> REGIONS: CPT

## 2021-04-28 RX ORDER — DULOXETIN HYDROCHLORIDE 60 MG/1
60 CAPSULE, DELAYED RELEASE ORAL 2 TIMES DAILY
Qty: 60 CAPSULE | Refills: 1 | Status: ON HOLD | OUTPATIENT
Start: 2021-04-28 | End: 2021-05-22

## 2021-04-28 NOTE — PROGRESS NOTES
Diagnosis: L knee instability      Insurance Type (# Auth): [employer] medicare 6 session to 5/24/2021        Treatment Number: 4     Subjective: . Has daily pain at L knee that limits mobility. No change in pain with therapy. Objective:     Man PT:  10 min

## 2021-04-28 NOTE — TELEPHONE ENCOUNTER
Attempted to reach pt's Lisandra Fontanez. Mailbox full. Unable to LM. Spoke to Abad Celestin who states she was in the OV during which Dr Nielsen May increased Cymbalta to BID. Asked Abad Celestin how long pt was taking increased dose and how he did at this dosage.  Abad Celestin states the

## 2021-04-30 NOTE — TELEPHONE ENCOUNTER
Spoke to Blanca Calix and Anoop Phillips, pt's EC, to advise on refill at increased dose and one refill attached. Asked that they provide a condition update on how pt is tolerating and responding to increased dose. Blanca Calix and [employer].

## 2021-05-03 ENCOUNTER — OFFICE VISIT (OUTPATIENT)
Dept: PHYSICAL THERAPY | Age: 66
End: 2021-05-03
Attending: INTERNAL MEDICINE
Payer: MEDICARE

## 2021-05-03 PROCEDURE — 97110 THERAPEUTIC EXERCISES: CPT

## 2021-05-03 PROCEDURE — 97140 MANUAL THERAPY 1/> REGIONS: CPT

## 2021-05-03 NOTE — PROGRESS NOTES
Diagnosis: L knee instability      Insurance Type (# Auth): [employer] medicare 6 session to 5/24/2021        Treatment Number: 5     Subjective: . Has daily pain at L knee that limits mobility. No change in pain with therapy.  Does HEP when he can remember too

## 2021-05-04 ENCOUNTER — APPOINTMENT (OUTPATIENT)
Dept: PHYSICAL THERAPY | Age: 66
End: 2021-05-04
Attending: INTERNAL MEDICINE
Payer: MEDICARE

## 2021-05-05 ENCOUNTER — OFFICE VISIT (OUTPATIENT)
Dept: PHYSICAL THERAPY | Age: 66
End: 2021-05-05
Attending: INTERNAL MEDICINE
Payer: MEDICARE

## 2021-05-05 PROCEDURE — 97140 MANUAL THERAPY 1/> REGIONS: CPT

## 2021-05-05 PROCEDURE — 97110 THERAPEUTIC EXERCISES: CPT

## 2021-05-05 NOTE — PROGRESS NOTES
Diagnosis: L knee instability      Insurance Type (# Auth): [employer] medicare 6 session to 5/24/2021      Physical Therapy Discharge Report  6 of 6 sessions completed    Subjective: Fritz Rubalcava Pt reports no change in knee pain.  States he has had bracing for knee in t prescription for knee brace,  consider use of bracing to address pain and instability. Goals: (to be met in 8 tx visits)  -Improve L quad setting as noted with pt attaining TKE during knee extension exercises.   met  -Improve L knee TKE control as noted

## 2021-05-06 ENCOUNTER — APPOINTMENT (OUTPATIENT)
Dept: PHYSICAL THERAPY | Age: 66
End: 2021-05-06
Attending: INTERNAL MEDICINE
Payer: MEDICARE

## 2021-05-08 DIAGNOSIS — N40.1 BENIGN PROSTATIC HYPERPLASIA WITH LOWER URINARY TRACT SYMPTOMS, SYMPTOM DETAILS UNSPECIFIED: Primary | ICD-10-CM

## 2021-05-10 ENCOUNTER — APPOINTMENT (OUTPATIENT)
Dept: PHYSICAL THERAPY | Age: 66
End: 2021-05-10
Attending: INTERNAL MEDICINE
Payer: MEDICARE

## 2021-05-10 RX ORDER — TAMSULOSIN HYDROCHLORIDE 0.4 MG/1
CAPSULE ORAL
Qty: 90 CAPSULE | Refills: 0 | Status: SHIPPED | OUTPATIENT
Start: 2021-05-10 | End: 2021-08-24

## 2021-05-10 NOTE — TELEPHONE ENCOUNTER
This RN approved the Tamsulosin refill today, 5/10. He was last seen by EZIO Vidal on 12/2/2019.  See office notes below:     ASSESSMENT/PLAN:   Urinary retention  (primary encounter diagnosis)  Benign prostatic hyperplasia with lower urinary tract symptoms

## 2021-05-11 ENCOUNTER — APPOINTMENT (OUTPATIENT)
Dept: PHYSICAL THERAPY | Age: 66
End: 2021-05-11
Attending: INTERNAL MEDICINE
Payer: MEDICARE

## 2021-05-12 ENCOUNTER — APPOINTMENT (OUTPATIENT)
Dept: PHYSICAL THERAPY | Age: 66
End: 2021-05-12
Attending: INTERNAL MEDICINE
Payer: MEDICARE

## 2021-05-13 ENCOUNTER — APPOINTMENT (OUTPATIENT)
Dept: PHYSICAL THERAPY | Age: 66
End: 2021-05-13
Attending: INTERNAL MEDICINE
Payer: MEDICARE

## 2021-05-17 ENCOUNTER — APPOINTMENT (OUTPATIENT)
Dept: PHYSICAL THERAPY | Age: 66
End: 2021-05-17
Attending: INTERNAL MEDICINE
Payer: MEDICARE

## 2021-05-19 ENCOUNTER — APPOINTMENT (OUTPATIENT)
Dept: PHYSICAL THERAPY | Age: 66
End: 2021-05-19
Attending: INTERNAL MEDICINE
Payer: MEDICARE

## 2021-05-21 ENCOUNTER — APPOINTMENT (OUTPATIENT)
Dept: MRI IMAGING | Facility: HOSPITAL | Age: 66
End: 2021-05-21
Attending: EMERGENCY MEDICINE
Payer: MEDICARE

## 2021-05-21 ENCOUNTER — HOSPITAL ENCOUNTER (OUTPATIENT)
Facility: HOSPITAL | Age: 66
Setting detail: OBSERVATION
Discharge: HOME | End: 2021-05-22
Attending: EMERGENCY MEDICINE | Admitting: HOSPITALIST
Payer: MEDICARE

## 2021-05-21 DIAGNOSIS — I10 ESSENTIAL HYPERTENSION: ICD-10-CM

## 2021-05-21 DIAGNOSIS — M54.2 NECK PAIN: ICD-10-CM

## 2021-05-21 DIAGNOSIS — R42 VERTIGO: Primary | ICD-10-CM

## 2021-05-21 PROBLEM — R73.9 HYPERGLYCEMIA: Status: ACTIVE | Noted: 2021-05-21

## 2021-05-21 PROBLEM — E87.1 HYPONATREMIA: Status: ACTIVE | Noted: 2021-05-21

## 2021-05-21 PROBLEM — E87.2 METABOLIC ACIDOSIS: Status: ACTIVE | Noted: 2021-05-21

## 2021-05-21 PROBLEM — E87.20 METABOLIC ACIDOSIS: Status: ACTIVE | Noted: 2021-05-21

## 2021-05-21 PROCEDURE — 99220 INITIAL OBSERVATION CARE,LEVL III: CPT | Performed by: INTERNAL MEDICINE

## 2021-05-21 PROCEDURE — 70549 MR ANGIOGRAPH NECK W/O&W/DYE: CPT | Performed by: EMERGENCY MEDICINE

## 2021-05-21 PROCEDURE — 70546 MR ANGIOGRAPH HEAD W/O&W/DYE: CPT | Performed by: EMERGENCY MEDICINE

## 2021-05-21 PROCEDURE — 70553 MRI BRAIN STEM W/O & W/DYE: CPT | Performed by: EMERGENCY MEDICINE

## 2021-05-21 RX ORDER — MECLIZINE HYDROCHLORIDE 25 MG/1
25 TABLET ORAL ONCE
Status: COMPLETED | OUTPATIENT
Start: 2021-05-21 | End: 2021-05-21

## 2021-05-21 RX ORDER — DIAZEPAM 5 MG/ML
5 INJECTION, SOLUTION INTRAMUSCULAR; INTRAVENOUS ONCE
Status: COMPLETED | OUTPATIENT
Start: 2021-05-21 | End: 2021-05-21

## 2021-05-21 RX ORDER — TAMSULOSIN HYDROCHLORIDE 0.4 MG/1
0.4 CAPSULE ORAL NIGHTLY
Status: DISCONTINUED | OUTPATIENT
Start: 2021-05-21 | End: 2021-05-22

## 2021-05-21 RX ORDER — ACETAMINOPHEN 325 MG/1
650 TABLET ORAL EVERY 6 HOURS PRN
Status: DISCONTINUED | OUTPATIENT
Start: 2021-05-21 | End: 2021-05-22

## 2021-05-21 RX ORDER — FENOFIBRATE 134 MG/1
134 CAPSULE ORAL
Status: DISCONTINUED | OUTPATIENT
Start: 2021-05-22 | End: 2021-05-22

## 2021-05-21 RX ORDER — DULOXETIN HYDROCHLORIDE 60 MG/1
60 CAPSULE, DELAYED RELEASE ORAL 2 TIMES DAILY
Status: DISCONTINUED | OUTPATIENT
Start: 2021-05-21 | End: 2021-05-22

## 2021-05-21 RX ORDER — SODIUM CHLORIDE 9 MG/ML
INJECTION, SOLUTION INTRAVENOUS CONTINUOUS
Status: DISCONTINUED | OUTPATIENT
Start: 2021-05-21 | End: 2021-05-22

## 2021-05-21 RX ORDER — LISINOPRIL 10 MG/1
10 TABLET ORAL DAILY
Status: DISCONTINUED | OUTPATIENT
Start: 2021-05-22 | End: 2021-05-22

## 2021-05-21 RX ORDER — ONDANSETRON 2 MG/ML
4 INJECTION INTRAMUSCULAR; INTRAVENOUS EVERY 6 HOURS PRN
Status: DISCONTINUED | OUTPATIENT
Start: 2021-05-21 | End: 2021-05-22

## 2021-05-21 RX ORDER — MECLIZINE HCL 12.5 MG/1
12.5 TABLET ORAL EVERY 6 HOURS SCHEDULED
Status: DISCONTINUED | OUTPATIENT
Start: 2021-05-21 | End: 2021-05-22

## 2021-05-21 RX ORDER — METOPROLOL SUCCINATE 25 MG/1
25 TABLET, EXTENDED RELEASE ORAL
Status: DISCONTINUED | OUTPATIENT
Start: 2021-05-22 | End: 2021-05-22

## 2021-05-21 RX ORDER — ROPINIROLE 1 MG/1
1 TABLET, FILM COATED ORAL 2 TIMES DAILY
Status: DISCONTINUED | OUTPATIENT
Start: 2021-05-21 | End: 2021-05-22

## 2021-05-21 RX ORDER — ASPIRIN 325 MG
325 TABLET ORAL DAILY
Status: DISCONTINUED | OUTPATIENT
Start: 2021-05-22 | End: 2021-05-22

## 2021-05-21 RX ORDER — ATORVASTATIN CALCIUM 80 MG/1
80 TABLET, FILM COATED ORAL NIGHTLY
Status: DISCONTINUED | OUTPATIENT
Start: 2021-05-21 | End: 2021-05-22

## 2021-05-21 RX ORDER — HEPARIN SODIUM 5000 [USP'U]/ML
5000 INJECTION, SOLUTION INTRAVENOUS; SUBCUTANEOUS EVERY 12 HOURS SCHEDULED
Status: DISCONTINUED | OUTPATIENT
Start: 2021-05-21 | End: 2021-05-22

## 2021-05-21 NOTE — ED QUICK NOTES
Pt c/o dizziness when performing visual stroke assessment, pt reports unable to look at TV without expierncing dizziness

## 2021-05-21 NOTE — H&P
[facility] HOSPITALIST                                                               History & Physical         Edger Spikes Patient Status:  Emergency    1955 MRN [id number]   Location [address] [facility] Attending Sammy Randall Heart Attack Brother       Reviewed    Social history:   reports that he has quit smoking. His smoking use included cigarettes. He quit after 20.00 years of use.  He has quit using smokeless tobacco. He reports previous alcohol use of about 6.0 standard dri 11/22/2020, 8:28 PM.  LGMIQE , MR, MRI BRAIN, MRA BRAIN&NECK (CPT=70551/05156/34327), 11/23/2020, 4:45 PM.       INDICATIONS:  eval CVA       TECHNIQUE:  MRI of the brain was performed with multi-planar T1, T2-weighted images with FLAIR sequences and diffu cerebral arteries is the right A1 segment and anterior communicating artery. ANTERIOR COMMUNICATING:  No visible stenosis or aneurysm. MIDDLE CEREBRALS:         No visible stenosis or aneurysm.    POSTERIOR COMMUNICATING: No visible stenosis or aneurysm of the brain met no evidence of acute ischemia hemorrhage or mass, MRA of the brain with no evidence of large branch occlusion or aneurysm of the [place]  2. Essential hypertension  1. Continue home metoprolol and lisinopril  2.  Follow blood press

## 2021-05-21 NOTE — ED PROVIDER NOTES
Patient Seen in: [facility] Emergency Department      History   Patient presents with:  Dizziness    Stated Complaint: DIZZINESS     HPI/Subjective:   HPI    23-year-old male complaining of dizziness patient states that he went to sleep feeling fine Never used    Alcohol use: Not Currently      Alcohol/week: 6.0 standard drinks      Types: 6 Standard drinks or equivalent per week    Drug use: Not Currently      Types: Cannabis      Comment: as needed for headaches             Review of Systems    Posi components within normal limits   CBC W/ DIFFERENTIAL - Abnormal; Notable for the following components:    RDW 15.2 (*)     RDW-SD 55.8 (*)     Lymphocyte Absolute 0.95 (*)     All other components within normal limits   CBC WITH DIFFERENTIAL WITH PLATELET

## 2021-05-21 NOTE — ED QUICK NOTES
PT IN ROOM FROM MRI, REPORTS VALIUM HELPFUL IN CURBING DIZZINESS, HOSPITALIST BEDSIDE, REPORT GIVEN, WILL CALL TRANSPORT WHEN MRI IS RESULTED

## 2021-05-21 NOTE — ED QUICK NOTES
POA CALLED FOR UPDATE, PER ROMEL, EPISODES OF INCREASED CONFUSION, SINCE STROKE POOR SHORT TERM MEMORY LOSS. REPORTS PT NOT MAKING SENSE AT APPROX. 13:00 PRIOR TO EMS CALL.

## 2021-05-21 NOTE — ED INITIAL ASSESSMENT (HPI)
Pt to ed from home after having dizziness while getting out of bed this morning, pt reports everything was dizzy and he fell to floor.  Pt made it to the bathroom to sit down and when he got back up he reports he \"felt like the inside of my head was twisti

## 2021-05-22 VITALS
OXYGEN SATURATION: 97 % | RESPIRATION RATE: 23 BRPM | WEIGHT: 206.63 LBS | DIASTOLIC BLOOD PRESSURE: 75 MMHG | HEART RATE: 75 BPM | HEIGHT: 66 IN | BODY MASS INDEX: 33.21 KG/M2 | TEMPERATURE: 98 F | SYSTOLIC BLOOD PRESSURE: 113 MMHG

## 2021-05-22 PROCEDURE — 99217 OBSERVATION CARE DISCHARGE: CPT | Performed by: HOSPITALIST

## 2021-05-22 PROCEDURE — 99214 OFFICE O/P EST MOD 30 MIN: CPT | Performed by: OTHER

## 2021-05-22 RX ORDER — DULOXETIN HYDROCHLORIDE 60 MG/1
60 CAPSULE, DELAYED RELEASE ORAL DAILY
Qty: 60 CAPSULE | Refills: 1 | Status: SHIPPED | COMMUNITY
Start: 2021-05-22 | End: 2021-08-23

## 2021-05-22 RX ORDER — MECLIZINE HCL 12.5 MG/1
25 TABLET ORAL 3 TIMES DAILY
Status: DISCONTINUED | OUTPATIENT
Start: 2021-05-22 | End: 2021-05-22

## 2021-05-22 RX ORDER — LISINOPRIL 10 MG/1
10 TABLET ORAL DAILY
Qty: 90 TABLET | Refills: 3 | Status: SHIPPED | OUTPATIENT
Start: 2021-05-22 | End: 2021-08-26

## 2021-05-22 NOTE — PLAN OF CARE
Assumed care at 0700, A/oX4, R/A, NSR on tele. Elec prot. . Voids. Dizziness better on increased dose of antivert. Neuro S/O. Discharge later. Nueropsych testing outpt. Will continue to monitor.    Problem: Patient/Family Goals  Goal: Patient/Family Long

## 2021-05-22 NOTE — PHYSICAL THERAPY NOTE
PHYSICAL THERAPY EVALUATION - INPATIENT     Room Number: 4536/7672-P  Evaluation Date: 5/22/2021  Type of Evaluation: Initial  Physician Order: PT Eval and Treat    Presenting Problem: dizziness, vertigo  Reason for Therapy: Mobility Dysfunction and knee replacement   • KNEE REPLACEMENT SURGERY  1986    left knee   • OTHER SURGICAL HISTORY  1999    right shoulder   • SHOULDER ARTHROSCOPY  1999   • TOTAL KNEE REPLACEMENT         HOME SITUATION  Type of Home: House   Home Layout: Two level;Bed/bath upst railing?: A Little       AM-PAC Score:  Raw Score: 21   Approx Degree of Impairment: 28.97%   Standardized Score (AM-PAC Scale): 50.25   CMS Modifier (G-Code): CJ    FUNCTIONAL ABILITY STATUS  Gait Assessment   Gait Assistance: Contact guard assist (cga-SB and patient showed 28.97% impairment. Based on this evaluation, patient's clinical presentation is evolving and overall the evaluation complexity is considered moderate.   These impairments and comorbidities manifest themselves as functional limitations in

## 2021-05-22 NOTE — DIETARY NOTE
[address] [facility]     Admitting diagnosis:  Vertigo [R42]    Ht: 167.6 cm (5' 6\")  Wt: 93.7 kg (206 lb 9.6 oz). Body mass index is 33.35 kg/m².     Wt Readings from Last 6 Encounters:  05/21/21 : 93.7 kg (206 l

## 2021-05-22 NOTE — PLAN OF CARE
Assumed patient care @0855  Patient is AOx4  On room air  NSR on tele, vss, afebrile, no Shortness of breath noted  Voids-urinal at bedside, had difficulty with staff present, escorted patient to bathroom, was able to void  Up with one and a walker(valarie limitations  - Instruct pt to call for assistance with activity based on assessment  - Modify environment to reduce risk of injury  - Provide assistive devices as appropriate  - Consider OT/PT consult to assist with strengthening/mobility  - Encourage toil

## 2021-05-22 NOTE — PROGRESS NOTES
[id number] [facility] Initial Evaluation    Carlos Castillo Patient Status:  Observation    1955 MRN [id number]   [facility] 3NE-A Attending Brenda Eldridge MD   Hosp Day # 0 PCP Licha Sosa MD     REASON FOR He reports previous alcohol use of about 6.0 standard drinks of alcohol per week. He reports previous drug use. Drug: Cannabis. ALLERGIES:  No Known Allergies    MEDICATIONS:  tamsulosin (FLOMAX) cap, Take 1 capsule (0.4 mg total) by mouth daily. , Disp: PRN  acetaminophen (TYLENOL) tab 650 mg, 650 mg, Oral, Q6H PRN  Heparin Sodium (Porcine) 5000 UNIT/ML injection 5,000 Units, 5,000 Units, Subcutaneous, 2 times per day        REVIEW OF SYSTEMS:  A 10-point system was reviewed.   Pertinent positives and nega mass  Intracranial MRA demonstrates otherwise no evidence of large branch occlusion or aneurysm of the [place]. Patient with acute onset of dizziness, which worsens with changes in body position, but not specifically with head movement.   Annmarie

## 2021-05-22 NOTE — PROGRESS NOTES
[facility] HOSPITALIST  Progress Note     Meryl Lyn Patient Status:  Observation    1955 MRN [id number]   [facility] 3NE-A Attending Prudencio Buerger, MD   Hosp Day # 0 PCP Nura Mendez MD     Chief Complaint: Vertigo hours. Cardiac  No results for input(s): TROP, PBNP in the last 168 hours. Creatinine Kinase  No results for input(s): CK in the last 168 hours. Inflammatory Markers  No results for input(s): CRP, BAL, LDH, DDIMER in the last 168 hours.     Imaging

## 2021-05-22 NOTE — CONSULTS
[id number] [facility] Initial Consultation    Eufemia Gaytan Patient Status:  Observation    1955 MRN [id number]   [facility] 3NE-A Attending Alexia Curling, MD   Hosp Day # 0 PCP Geoffery Blizzard, MD [address] (benign prostatic hyperplasia) 9/19/2014   • CAD, multiple vessel 2005,7,12    nonocclusive   • Coronary atherosclerosis    • Diverticulitis    • High blood pressure    • HTN (hypertension)    • IGT (impaired glucose tolerance) 6/9/2015   • Other and unspe Take 1 tablet (10 mg total) by mouth daily. , Disp: 90 tablet, Rfl: 3, 5/20/2021 at 1000  Fenofibrate 145 MG Oral Tab, Take 1 tablet (145 mg total) by mouth daily. , Disp: 90 tablet, Rfl: 3, 5/20/2021 at 1000  aspirin 325 MG Oral Tab, Take 1 tablet (325 mg t intact  Memory: impaired 0/3 on delayed recall  Attention/concentration: impaired - not able to spell \"world' in reverse order - said \"DLROD\"    CN: PERRL, EOMI without nystagmus but endorses side by side double vision in primary gaze, VFF, smile symmet expected for BPPV.  He has had some improvement with meclizine and IV fluids    MRI brain was done and showed no new stroke and showed stable R vertebral occlusion, which is chronic; otherwise, he does appear mildly confused / disoriented but this has also

## 2021-05-22 NOTE — ED PROVIDER NOTES
Signout to check MRI brain which shows no acute CVA. Results below.     MRI BRAIN MRA HEAD+MRA NECK (ALL W+WO) (CPT=70553/05102/57663)    Result Date: 5/21/2021  PROCEDURE:  MRI BRAIN MRA HEAD+MRA NECK (ALL W+WO) (CPT=70553/83273/41365)  COMPARISON:  SABI This is a stable finding. MRA BRAIN INTRACRANIAL CAROTIDS:         No visible stenosis or aneurysm. ANTERIOR CEREBRALS:         Left A1 segment is a small vessel.   This is probably developmental.  Dominant supply of the anterior cerebral arteries is the r at 7:09 PM

## 2021-05-22 NOTE — DISCHARGE SUMMARY
[facility] HOSPITALIST  DISCHARGE SUMMARY     Romana Lites Patient Status:  Observation    1955 MRN [id number]   [facility] 3NE-A Attending Eli Shaw MD   Hosp Day # 0 PCP Veronica Simon MD     Date of Admission:  this      Take 1 capsule (60 mg total) by mouth daily. Quantity: 60 capsule  Refills: 1     lisinopril 10 MG Tabs  What changed: additional instructions      Take 1 tablet (10 mg total) by mouth daily.  Hold if systolic blood pressure less than 120 mmHg MD    Time spent:  > 30 minutes

## 2021-05-23 NOTE — PROGRESS NOTES
NURSING DISCHARGE NOTE    Discharged Home via [place]. Accompanied by Family member  Belongings taken by patient. Discharge paperwork provided and explained. Follow up appointments stressed. All questions and concerns addressed.  Pt taken to waiting

## 2021-05-24 ENCOUNTER — TELEPHONE (OUTPATIENT)
Dept: INTERNAL MEDICINE CLINIC | Facility: CLINIC | Age: 66
End: 2021-05-24

## 2021-05-24 ENCOUNTER — APPOINTMENT (OUTPATIENT)
Dept: PHYSICAL THERAPY | Age: 66
End: 2021-05-24
Attending: INTERNAL MEDICINE
Payer: MEDICARE

## 2021-05-24 DIAGNOSIS — R46.89 BEHAVIORAL CHANGE: ICD-10-CM

## 2021-05-24 DIAGNOSIS — Z01.89 ENCOUNTER FOR NEUROPSYCHOLOGICAL TESTING: ICD-10-CM

## 2021-05-24 DIAGNOSIS — R41.3 MEMORY LOSS: Primary | ICD-10-CM

## 2021-05-24 NOTE — TELEPHONE ENCOUNTER
Spoke with Neo Said, on pts hipaa consent. Referral placed to Dr Skylar Ramirez.     She wanted to speak with Dr Grace Redding regarding some concerns, did not cancel appt yet (though I had cancelled TCM, please advise if need re-ordered)    1) pt not sleeping, she wanted to kn

## 2021-05-24 NOTE — TELEPHONE ENCOUNTER
Functional Status Done?:  yes                        Provider's Name?:  Juanita Gurrola  Provider's Specialty?:  Neuro  Reason for Visit?:  HFU  Diagnosis?:    Number of Visits Requested?:  1  Last Visit with Specialist?:  Not sure   Is Appt.  Already [employer]

## 2021-05-24 NOTE — TELEPHONE ENCOUNTER
Melatonin ok to try,   Sedatives dangerous as he falls  Check ammonia level thanks  Very rare without liver disease, that never made sense

## 2021-05-24 NOTE — TELEPHONE ENCOUNTER
Confirmed with Dr Phebe Brittle ok to order neuropsych eval.  Spoke with Nita Stallings. Advised of below. HFU appt with Dr Phebe Brittle cancelled.

## 2021-05-24 NOTE — TELEPHONE ENCOUNTER
Pt cousin Raine Purvis called (on HIPPA) pt needs HFU- was admitted to [facility] 5/21/21    Scheduled HFU with  5/24/21

## 2021-05-25 ENCOUNTER — LAB ENCOUNTER (OUTPATIENT)
Dept: LAB | Age: 66
End: 2021-05-25
Attending: INTERNAL MEDICINE
Payer: MEDICARE

## 2021-05-25 ENCOUNTER — PATIENT OUTREACH (OUTPATIENT)
Dept: CASE MANAGEMENT | Age: 66
End: 2021-05-25

## 2021-05-25 DIAGNOSIS — Z11.59 NEED FOR HEPATITIS C SCREENING TEST: ICD-10-CM

## 2021-05-25 DIAGNOSIS — R41.3 MEMORY LOSS: ICD-10-CM

## 2021-05-25 DIAGNOSIS — R46.89 BEHAVIORAL CHANGE: ICD-10-CM

## 2021-05-25 PROCEDURE — 36415 COLL VENOUS BLD VENIPUNCTURE: CPT

## 2021-05-25 PROCEDURE — 86803 HEPATITIS C AB TEST: CPT

## 2021-05-25 PROCEDURE — 82140 ASSAY OF AMMONIA: CPT

## 2021-05-26 ENCOUNTER — TELEPHONE (OUTPATIENT)
Dept: INTERNAL MEDICINE CLINIC | Facility: CLINIC | Age: 66
End: 2021-05-26

## 2021-05-26 ENCOUNTER — APPOINTMENT (OUTPATIENT)
Dept: PHYSICAL THERAPY | Age: 66
End: 2021-05-26
Attending: INTERNAL MEDICINE
Payer: MEDICARE

## 2021-05-26 DIAGNOSIS — R79.89 INCREASED AMMONIA LEVEL: Primary | ICD-10-CM

## 2021-05-26 NOTE — TELEPHONE ENCOUNTER
Ammonia lab resulted. Please advise. Dr Nakia Quintero not in office today.     Contains abnormal data AMMONIA, PLASMA  Order: [id number]  Collected:  5/25/2021 11:23 AM   Status:  Final result   Dx:  Memory loss; Behavioral change   0 Result Notes  Component   Ref R

## 2021-05-28 ENCOUNTER — LAB ENCOUNTER (OUTPATIENT)
Dept: LAB | Age: 66
End: 2021-05-28
Attending: INTERNAL MEDICINE
Payer: MEDICARE

## 2021-05-28 DIAGNOSIS — R79.89 INCREASED AMMONIA LEVEL: ICD-10-CM

## 2021-05-28 PROCEDURE — 82140 ASSAY OF AMMONIA: CPT

## 2021-05-28 PROCEDURE — 36415 COLL VENOUS BLD VENIPUNCTURE: CPT

## 2021-06-02 ENCOUNTER — OFFICE VISIT (OUTPATIENT)
Dept: NEUROLOGY | Facility: CLINIC | Age: 66
End: 2021-06-02
Payer: MEDICARE

## 2021-06-02 ENCOUNTER — APPOINTMENT (OUTPATIENT)
Dept: PHYSICAL THERAPY | Age: 66
End: 2021-06-02
Attending: INTERNAL MEDICINE
Payer: MEDICARE

## 2021-06-02 VITALS
HEART RATE: 86 BPM | WEIGHT: 224 LBS | BODY MASS INDEX: 36 KG/M2 | SYSTOLIC BLOOD PRESSURE: 104 MMHG | DIASTOLIC BLOOD PRESSURE: 66 MMHG | RESPIRATION RATE: 16 BRPM

## 2021-06-02 DIAGNOSIS — E72.20 HYPERAMMONEMIA (HCC): ICD-10-CM

## 2021-06-02 DIAGNOSIS — R41.3 MEMORY LOSS: Primary | ICD-10-CM

## 2021-06-02 PROCEDURE — 3074F SYST BP LT 130 MM HG: CPT | Performed by: OTHER

## 2021-06-02 PROCEDURE — 99215 OFFICE O/P EST HI 40 MIN: CPT | Performed by: OTHER

## 2021-06-02 PROCEDURE — 3078F DIAST BP <80 MM HG: CPT | Performed by: OTHER

## 2021-06-02 RX ORDER — METHYLPREDNISOLONE 4 MG/1
TABLET ORAL
Qty: 1 EACH | Refills: 0 | Status: SHIPPED | OUTPATIENT
Start: 2021-06-02 | End: 2021-08-26 | Stop reason: ALTCHOICE

## 2021-06-02 NOTE — PROGRESS NOTES
[facility] Neurology Outpatient Progress Note  Date of service: 6/2/2021    Patient here for a follow-up visit for headaches, confusion/memory change, high ammonia levels, vertigo. Was seen in ER 5/21. Dr Yanick Mims saw him in house for vertigo.    Pierre Riddle pressure    • HTN (hypertension)    • IGT (impaired glucose tolerance) 6/9/2015   • Other and unspecified hyperlipidemia    • RLS (restless legs syndrome) 12/3/2015   • [facility]     [facility]2017     Past Surgical History:   Procedure Laterality Date   • KNEE syndrome)  Occlusion of right vertebral artery: chronic  Incidental finding of cavernous malformation: does not need surgery per neurosurgery     Plan:   Reviewed recent brain imaging with pt, no new acute change  GI referral re; elevated ammonia  Neuropsy

## 2021-07-20 ENCOUNTER — TELEPHONE (OUTPATIENT)
Dept: CARDIOLOGY | Age: 66
End: 2021-07-20

## 2021-07-20 DIAGNOSIS — R07.9 CHEST PAIN, UNSPECIFIED TYPE: ICD-10-CM

## 2021-07-20 DIAGNOSIS — E78.2 MIXED HYPERLIPIDEMIA: ICD-10-CM

## 2021-07-20 DIAGNOSIS — I25.10 ATHEROSCLEROSIS OF NATIVE CORONARY ARTERY WITHOUT ANGINA PECTORIS, UNSPECIFIED WHETHER NATIVE OR TRANSPLANTED HEART: Primary | ICD-10-CM

## 2021-07-22 ENCOUNTER — APPOINTMENT (OUTPATIENT)
Dept: CARDIOLOGY | Age: 66
End: 2021-07-22

## 2021-08-03 ENCOUNTER — TELEPHONE (OUTPATIENT)
Dept: CARDIOLOGY | Age: 66
End: 2021-08-03

## 2021-08-10 ENCOUNTER — APPOINTMENT (OUTPATIENT)
Dept: GENERAL RADIOLOGY | Facility: HOSPITAL | Age: 66
End: 2021-08-10
Attending: EMERGENCY MEDICINE
Payer: MEDICARE

## 2021-08-10 ENCOUNTER — HOSPITAL ENCOUNTER (EMERGENCY)
Facility: HOSPITAL | Age: 66
Discharge: HOME | End: 2021-08-10
Attending: EMERGENCY MEDICINE
Payer: MEDICARE

## 2021-08-10 VITALS
RESPIRATION RATE: 18 BRPM | BODY MASS INDEX: 38.57 KG/M2 | DIASTOLIC BLOOD PRESSURE: 77 MMHG | WEIGHT: 240 LBS | OXYGEN SATURATION: 96 % | SYSTOLIC BLOOD PRESSURE: 113 MMHG | HEART RATE: 79 BPM | HEIGHT: 66 IN | TEMPERATURE: 97 F

## 2021-08-10 DIAGNOSIS — S16.1XXA STRAIN OF NECK MUSCLE, INITIAL ENCOUNTER: Primary | ICD-10-CM

## 2021-08-10 DIAGNOSIS — S39.012A BACK STRAIN, INITIAL ENCOUNTER: ICD-10-CM

## 2021-08-10 PROCEDURE — 99284 EMERGENCY DEPT VISIT MOD MDM: CPT

## 2021-08-10 PROCEDURE — 72100 X-RAY EXAM L-S SPINE 2/3 VWS: CPT | Performed by: EMERGENCY MEDICINE

## 2021-08-10 PROCEDURE — 72040 X-RAY EXAM NECK SPINE 2-3 VW: CPT | Performed by: EMERGENCY MEDICINE

## 2021-08-10 PROCEDURE — 72072 X-RAY EXAM THORAC SPINE 3VWS: CPT | Performed by: EMERGENCY MEDICINE

## 2021-08-10 RX ORDER — METAXALONE 800 MG/1
400 TABLET ORAL 3 TIMES DAILY
Qty: 20 TABLET | Refills: 0 | Status: SHIPPED | OUTPATIENT
Start: 2021-08-10 | End: 2021-08-26 | Stop reason: ALTCHOICE

## 2021-08-10 NOTE — ED INITIAL ASSESSMENT (HPI)
Pt fell 8/8/21 getting out of the bathtub hit his head on the corner of the sink. Pt came into today because pain is getting unbearable starting down his neck into his back 12/10.

## 2021-08-10 NOTE — ED PROVIDER NOTES
Patient Seen in: [facility] Emergency Department      History   Patient presents with:  Trauma    Stated Complaint: fell while getting out of the shower yesterday.  + upper back pain    HPI/Subjective:   HPI    71-year-old male comes to the hospital a while-gummies             Review of Systems    Positive for stated complaint: fell while getting out of the shower yesterday.  + upper back pain  Other systems are as noted in HPI. Constitutional and vital signs reviewed.       All other systems reviewed narrowing. Bridging marginal osteophytes throughout the mid and lower thoracic spine, largest at T11-12. Intact pedicles. Curvatures are within normal limits.             CONCLUSION:  Bridging osteophytes throughout mid and lower thoracic spine consisten today because pain is getting unbearable starting  down his neck into his back 12/10. FINDINGS:  No fracture or subluxation. Disc space narrowing and marginal osteophytes at C5-6. Anterior osteophytes at C3-4, C4-5 and C6-7.   Curvatures and prevertebr mouth 3 (three) times daily.   Qty: 20 tablet Refills: 0

## 2021-08-16 ENCOUNTER — HOSPITAL ENCOUNTER (OUTPATIENT)
Dept: ULTRASOUND IMAGING | Age: 66
Discharge: HOME | End: 2021-08-16
Attending: INTERNAL MEDICINE
Payer: MEDICARE

## 2021-08-16 DIAGNOSIS — R79.89 INCREASED AMMONIA LEVEL: ICD-10-CM

## 2021-08-16 DIAGNOSIS — R46.89 BEHAVIORAL CHANGE: ICD-10-CM

## 2021-08-16 PROCEDURE — 76700 US EXAM ABDOM COMPLETE: CPT | Performed by: INTERNAL MEDICINE

## 2021-08-17 ENCOUNTER — ANCILLARY PROCEDURE (OUTPATIENT)
Dept: CARDIOLOGY | Age: 66
End: 2021-08-17
Attending: INTERNAL MEDICINE

## 2021-08-17 DIAGNOSIS — R07.9 CHEST PAIN, UNSPECIFIED TYPE: ICD-10-CM

## 2021-08-17 DIAGNOSIS — E78.2 MIXED HYPERLIPIDEMIA: ICD-10-CM

## 2021-08-17 DIAGNOSIS — I25.10 ATHEROSCLEROSIS OF NATIVE CORONARY ARTERY WITHOUT ANGINA PECTORIS, UNSPECIFIED WHETHER NATIVE OR TRANSPLANTED HEART: ICD-10-CM

## 2021-08-17 LAB
HEART RATE RESERVE PREDICTED: 33.97 BPM
LV EF: 68 %
RESTING HR ACHIEVED: 85 BPM
STRESS BASELINE BP: NORMAL MMHG
STRESS PEAK HR: 103 BPM
STRESS PERCENT HR: 66 %
STRESS POST PEAK BP: NORMAL MMHG
STRESS TARGET HR: 156 BPM

## 2021-08-17 PROCEDURE — A9502 TC99M TETROFOSMIN: HCPCS | Performed by: INTERNAL MEDICINE

## 2021-08-17 PROCEDURE — G1004 CDSM NDSC: HCPCS | Performed by: INTERNAL MEDICINE

## 2021-08-17 PROCEDURE — 78452 HT MUSCLE IMAGE SPECT MULT: CPT | Performed by: INTERNAL MEDICINE

## 2021-08-17 PROCEDURE — 93015 CV STRESS TEST SUPVJ I&R: CPT | Performed by: INTERNAL MEDICINE

## 2021-08-17 RX ORDER — REGADENOSON 0.08 MG/ML
0.4 INJECTION, SOLUTION INTRAVENOUS ONCE
Status: COMPLETED | OUTPATIENT
Start: 2021-08-17 | End: 2021-08-17

## 2021-08-17 RX ADMIN — REGADENOSON 0.4 MG: 0.08 INJECTION, SOLUTION INTRAVENOUS at 10:40

## 2021-08-17 ASSESSMENT — EXERCISE STRESS TEST
PEAK_RPP: 11214
PEAK_HR: 103
PEAK_BP: 126/70
PEAK_HR: 103
PEAK_BP: 120/70
PEAK_RPP: 10712
PEAK_BP: 104/60
STAGE_CATEGORIES: RECOVERY 1
PEAK_RPP: 10880
STAGE_CATEGORIES: RECOVERY 0
PEAK_HR: 93
PEAK_HR: 85
PEAK_RPP: 11160
PEAK_HR: 89
PEAK_BP: 128/72
STAGE_CATEGORIES: 1
PEAK_BP: 104/60
STAGE_CATEGORIES: RECOVERY 2
PEAK_RPP: 10712
STAGE_CATEGORIES: RESTING

## 2021-08-23 DIAGNOSIS — M54.2 NECK PAIN: ICD-10-CM

## 2021-08-23 DIAGNOSIS — N40.1 BENIGN PROSTATIC HYPERPLASIA WITH LOWER URINARY TRACT SYMPTOMS, SYMPTOM DETAILS UNSPECIFIED: ICD-10-CM

## 2021-08-23 RX ORDER — DULOXETIN HYDROCHLORIDE 60 MG/1
CAPSULE, DELAYED RELEASE ORAL
Qty: 60 CAPSULE | Refills: 0 | Status: SHIPPED | OUTPATIENT
Start: 2021-08-23 | End: 2021-10-14

## 2021-08-23 NOTE — TELEPHONE ENCOUNTER
Medication: DULoxetine HCl 60 MG Oral Cap DR La    Date of last refill: 05/22/21    Last office visit: 02/08/21  Due back to clinic per last office note:  na  Date next office visit scheduled:  na        Last OV note recommendation:   ASSESSMENT AND

## 2021-08-24 RX ORDER — TAMSULOSIN HYDROCHLORIDE 0.4 MG/1
CAPSULE ORAL
Qty: 30 CAPSULE | Refills: 0 | Status: SHIPPED | OUTPATIENT
Start: 2021-08-24 | End: 2021-08-26

## 2021-08-24 NOTE — TELEPHONE ENCOUNTER
Pt overdue for follow up visit plus need PSA done. Message left for pt to call and schedule. One month refill only until pt schedules an appt.      Benign Prostatic Hypertrophy Medications      Protocol Criteria:  • Appointment scheduled in the past 12 m

## 2021-08-26 ENCOUNTER — OFFICE VISIT (OUTPATIENT)
Dept: INTERNAL MEDICINE CLINIC | Facility: CLINIC | Age: 66
End: 2021-08-26
Payer: MEDICARE

## 2021-08-26 VITALS
OXYGEN SATURATION: 96 % | TEMPERATURE: 97 F | HEART RATE: 115 BPM | HEIGHT: 66 IN | BODY MASS INDEX: 34.91 KG/M2 | SYSTOLIC BLOOD PRESSURE: 104 MMHG | RESPIRATION RATE: 16 BRPM | WEIGHT: 217.19 LBS | DIASTOLIC BLOOD PRESSURE: 64 MMHG

## 2021-08-26 DIAGNOSIS — M54.50 CHRONIC LOW BACK PAIN, UNSPECIFIED BACK PAIN LATERALITY, UNSPECIFIED WHETHER SCIATICA PRESENT: ICD-10-CM

## 2021-08-26 DIAGNOSIS — E78.2 MIXED HYPERLIPIDEMIA: ICD-10-CM

## 2021-08-26 DIAGNOSIS — I25.10 ATHEROSCLEROSIS OF NATIVE CORONARY ARTERY OF NATIVE HEART WITHOUT ANGINA PECTORIS: ICD-10-CM

## 2021-08-26 DIAGNOSIS — N40.0 BENIGN PROSTATIC HYPERPLASIA, UNSPECIFIED WHETHER LOWER URINARY TRACT SYMPTOMS PRESENT: ICD-10-CM

## 2021-08-26 DIAGNOSIS — G89.29 CHRONIC LOW BACK PAIN, UNSPECIFIED BACK PAIN LATERALITY, UNSPECIFIED WHETHER SCIATICA PRESENT: ICD-10-CM

## 2021-08-26 DIAGNOSIS — Z86.73 HISTORY OF STROKE: ICD-10-CM

## 2021-08-26 DIAGNOSIS — M54.2 NECK PAIN: ICD-10-CM

## 2021-08-26 DIAGNOSIS — E78.5 DYSLIPIDEMIA: Primary | ICD-10-CM

## 2021-08-26 DIAGNOSIS — I10 PRIMARY HYPERTENSION: ICD-10-CM

## 2021-08-26 DIAGNOSIS — N40.1 BENIGN PROSTATIC HYPERPLASIA WITH LOWER URINARY TRACT SYMPTOMS, SYMPTOM DETAILS UNSPECIFIED: ICD-10-CM

## 2021-08-26 DIAGNOSIS — N18.31 STAGE 3A CHRONIC KIDNEY DISEASE (HCC): ICD-10-CM

## 2021-08-26 DIAGNOSIS — I48.0 PAROXYSMAL ATRIAL FIBRILLATION (HCC): ICD-10-CM

## 2021-08-26 PROCEDURE — 3008F BODY MASS INDEX DOCD: CPT | Performed by: NURSE PRACTITIONER

## 2021-08-26 PROCEDURE — 3078F DIAST BP <80 MM HG: CPT | Performed by: NURSE PRACTITIONER

## 2021-08-26 PROCEDURE — 99214 OFFICE O/P EST MOD 30 MIN: CPT | Performed by: NURSE PRACTITIONER

## 2021-08-26 PROCEDURE — 3074F SYST BP LT 130 MM HG: CPT | Performed by: NURSE PRACTITIONER

## 2021-08-26 RX ORDER — FENOFIBRATE 145 MG/1
145 TABLET, COATED ORAL DAILY
Qty: 90 TABLET | Refills: 1 | Status: SHIPPED | OUTPATIENT
Start: 2021-08-26 | End: 2022-01-21

## 2021-08-26 RX ORDER — TAMSULOSIN HYDROCHLORIDE 0.4 MG/1
CAPSULE ORAL
Qty: 90 CAPSULE | Refills: 0 | Status: SHIPPED | OUTPATIENT
Start: 2021-08-26 | End: 2021-09-16

## 2021-08-26 RX ORDER — LISINOPRIL 10 MG/1
10 TABLET ORAL DAILY
Qty: 90 TABLET | Refills: 3 | Status: SHIPPED | OUTPATIENT
Start: 2021-08-26 | End: 2022-01-21

## 2021-08-26 RX ORDER — ROPINIROLE 1 MG/1
1 TABLET, FILM COATED ORAL 2 TIMES DAILY
Qty: 180 TABLET | Refills: 1 | Status: SHIPPED | OUTPATIENT
Start: 2021-08-26 | End: 2022-01-21

## 2021-08-26 RX ORDER — ATORVASTATIN CALCIUM 80 MG/1
80 TABLET, FILM COATED ORAL NIGHTLY
Qty: 90 TABLET | Refills: 1 | Status: SHIPPED | OUTPATIENT
Start: 2021-08-26 | End: 2022-01-21

## 2021-08-26 RX ORDER — METOPROLOL SUCCINATE 25 MG/1
25 TABLET, EXTENDED RELEASE ORAL DAILY
Qty: 90 TABLET | Refills: 1 | Status: SHIPPED | OUTPATIENT
Start: 2021-08-26 | End: 2022-01-21

## 2021-08-26 RX ORDER — ASPIRIN 325 MG
325 TABLET ORAL DAILY
Qty: 90 TABLET | Refills: 1 | Status: SHIPPED | OUTPATIENT
Start: 2021-08-26

## 2021-08-26 NOTE — PROGRESS NOTES
Mili Elise is a 73year old male. CHIEF COMPLAINT   Med check, back pain, urine hesitancy    HPI:   Back pain and neck pain are the same. Tylenol for pain. No numbness or tingling to arms no weakness. No numbness or tingling to the legs.  No in Social History:  Social History    Tobacco Use      Smoking status: Former Smoker        Years: 20.00        Types: Cigarettes      Smokeless tobacco: Former User      Tobacco comment: quit 1985    Vaping Use      Vaping Use: Never used    Alcohol use 283 05/21/2021    ALKPHO 46 05/21/2021    AST 33 05/21/2021    ALT 27 05/21/2021    BILT 1.0 05/21/2021    TP 7.5 05/21/2021    ALB 3.9 05/21/2021    GLOBULT 2.6 03/22/2016    GLOBULIN 3.6 05/21/2021    ALBGLOBRAT 1.7 03/22/2016     (L) 05/21/2021 (CPT=76700)    Result Date: 8/16/2021  PROCEDURE:  US ABDOMEN COMPLETE (CPT=76700)  COMPARISON:  US JUANJO, US KIDNEY/BLADDER (DSI=[id number]), 3/04/2019, 8:21 AM.  INDICATIONS:  R79.89 Increased ammonia level R46.89 Behavioral change  TECHNIQUE:  Real time g without evidence of aneurysm. CONCLUSION:  L4-5 and L5-S1 degenerative disc disease. Slight levoscoliosis.     Dictated by (CST): Radha Ho MD on 8/10/2021 at 4:41 PM     Finalized by (CST): Radha Ho MD on 8/10/2021 at 4:42 PM       XR monitor   - COMP METABOLIC PANEL (14); Future    6. Chronic low back pain, unspecified back pain laterality, unspecified whether sciatica present  - see pain service. May increase tylenol and topical meds  - OP REFERRAL PAIN MANGEMENT    7.  Atherosclerosis

## 2021-08-26 NOTE — PATIENT INSTRUCTIONS
Get your lab done. Continue your current medications    See urology    See cardiology    See the pain doctor    You may use tylenol 1000mg BID for pain as needed, topical medications like voltaren gel and lidocaine. Use ice or heat.      Follow up in [facility] [address]

## 2021-09-02 ENCOUNTER — APPOINTMENT (OUTPATIENT)
Dept: CARDIOLOGY | Age: 66
End: 2021-09-02

## 2021-09-16 ENCOUNTER — OFFICE VISIT (OUTPATIENT)
Dept: SURGERY | Facility: CLINIC | Age: 66
End: 2021-09-16
Payer: MEDICARE

## 2021-09-16 VITALS
HEART RATE: 71 BPM | WEIGHT: 220 LBS | HEIGHT: 66 IN | SYSTOLIC BLOOD PRESSURE: 106 MMHG | DIASTOLIC BLOOD PRESSURE: 70 MMHG | BODY MASS INDEX: 35.36 KG/M2

## 2021-09-16 DIAGNOSIS — N40.1 BENIGN PROSTATIC HYPERPLASIA WITH LOWER URINARY TRACT SYMPTOMS, SYMPTOM DETAILS UNSPECIFIED: Primary | ICD-10-CM

## 2021-09-16 DIAGNOSIS — Z12.5 SCREENING PSA (PROSTATE SPECIFIC ANTIGEN): ICD-10-CM

## 2021-09-16 PROCEDURE — 3078F DIAST BP <80 MM HG: CPT | Performed by: NURSE PRACTITIONER

## 2021-09-16 PROCEDURE — 3008F BODY MASS INDEX DOCD: CPT | Performed by: NURSE PRACTITIONER

## 2021-09-16 PROCEDURE — 99213 OFFICE O/P EST LOW 20 MIN: CPT | Performed by: NURSE PRACTITIONER

## 2021-09-16 PROCEDURE — 3074F SYST BP LT 130 MM HG: CPT | Performed by: NURSE PRACTITIONER

## 2021-09-16 RX ORDER — TAMSULOSIN HYDROCHLORIDE 0.4 MG/1
0.4 CAPSULE ORAL DAILY
Qty: 90 CAPSULE | Refills: 3 | Status: SHIPPED | OUTPATIENT
Start: 2021-09-16

## 2021-09-16 NOTE — PROGRESS NOTES
Subjective:     Patient ID: Vignesh Bradley is a 73year old male. HPI     Patient is a 73year old male who presents to the clinic for a follow up. Last seen in the clinic 12/2019. Patient with a history of BPH and urinary retention.   Proble Diagnosis Date   • Acute, but ill-defined, cerebrovascular disease    • Arthritis    • BPH (benign prostatic hyperplasia) 9/19/2014   • CAD, multiple vessel 8050,8,24    nonocclusive   • Coronary atherosclerosis    • Diverticulitis    • High blood pressu Behavior normal.         Thought Content:  Thought content normal.         Judgment: Judgment normal.         Assessment & Plan:   Benign prostatic hyperplasia with lower urinary tract symptoms, symptom details unspecified  (primary encounter diagnosis)  Sc

## 2021-09-27 ENCOUNTER — OFFICE VISIT (OUTPATIENT)
Dept: ORTHOPEDICS CLINIC | Facility: CLINIC | Age: 66
End: 2021-09-27
Payer: MEDICARE

## 2021-09-27 VITALS — OXYGEN SATURATION: 98 % | WEIGHT: 224.63 LBS | HEIGHT: 67.5 IN | BODY MASS INDEX: 34.84 KG/M2 | HEART RATE: 82 BPM

## 2021-09-27 DIAGNOSIS — M25.562 LEFT KNEE PAIN, UNSPECIFIED CHRONICITY: Primary | ICD-10-CM

## 2021-09-27 DIAGNOSIS — N40.1 BENIGN PROSTATIC HYPERPLASIA WITH LOWER URINARY TRACT SYMPTOMS, SYMPTOM DETAILS UNSPECIFIED: ICD-10-CM

## 2021-09-27 DIAGNOSIS — M23.52 CHRONIC INSTABILITY OF LEFT KNEE: ICD-10-CM

## 2021-09-27 PROCEDURE — 99213 OFFICE O/P EST LOW 20 MIN: CPT | Performed by: ORTHOPAEDIC SURGERY

## 2021-09-27 PROCEDURE — 3008F BODY MASS INDEX DOCD: CPT | Performed by: ORTHOPAEDIC SURGERY

## 2021-09-27 RX ORDER — TAMSULOSIN HYDROCHLORIDE 0.4 MG/1
0.4 CAPSULE ORAL DAILY
Qty: 90 CAPSULE | Refills: 3 | OUTPATIENT
Start: 2021-09-27

## 2021-09-27 NOTE — PROGRESS NOTES
[facility] Progress Note    Subjective: Patient returns to clinic for discussion of his left knee. He is with a different family member today. He states he has done physical therapy with no relief.   He continues to have weakness in the left lower e revision knee replacement, as it would not likely improve his complaint and would involve risk without any likely reward.   The patient and his family member are asking if there is anything arthroscopic that can be done for his knee, however revision knee r

## 2021-10-01 ENCOUNTER — TELEPHONE (OUTPATIENT)
Dept: INTERNAL MEDICINE CLINIC | Facility: CLINIC | Age: 66
End: 2021-10-01

## 2021-10-01 NOTE — TELEPHONE ENCOUNTER
Incoming (mail or fax):  fax  Received from:  [facility] [place] [facility]  Documentation given to:  Triage    Authorization/Referral Request Form

## 2021-10-13 DIAGNOSIS — M54.2 NECK PAIN: ICD-10-CM

## 2021-10-14 ENCOUNTER — OFFICE VISIT (OUTPATIENT)
Dept: CARDIOLOGY | Age: 66
End: 2021-10-14

## 2021-10-14 VITALS
BODY MASS INDEX: 35.84 KG/M2 | WEIGHT: 223 LBS | HEART RATE: 98 BPM | DIASTOLIC BLOOD PRESSURE: 79 MMHG | SYSTOLIC BLOOD PRESSURE: 117 MMHG | HEIGHT: 66 IN

## 2021-10-14 DIAGNOSIS — I65.29 STENOSIS OF CAROTID ARTERY, UNSPECIFIED LATERALITY: ICD-10-CM

## 2021-10-14 DIAGNOSIS — R07.9 CHEST PAIN, UNSPECIFIED TYPE: ICD-10-CM

## 2021-10-14 DIAGNOSIS — I25.10 ATHEROSCLEROSIS OF NATIVE CORONARY ARTERY WITHOUT ANGINA PECTORIS, UNSPECIFIED WHETHER NATIVE OR TRANSPLANTED HEART: ICD-10-CM

## 2021-10-14 DIAGNOSIS — I63.9 CEREBELLAR STROKE (CMD): ICD-10-CM

## 2021-10-14 DIAGNOSIS — E78.2 MIXED HYPERLIPIDEMIA: Primary | ICD-10-CM

## 2021-10-14 PROCEDURE — 99214 OFFICE O/P EST MOD 30 MIN: CPT | Performed by: INTERNAL MEDICINE

## 2021-10-14 RX ORDER — DULOXETIN HYDROCHLORIDE 60 MG/1
CAPSULE, DELAYED RELEASE ORAL
Qty: 60 CAPSULE | Refills: 0 | Status: SHIPPED | OUTPATIENT
Start: 2021-10-14 | End: 2021-12-23

## 2021-10-14 NOTE — TELEPHONE ENCOUNTER
Medication: DULOXETINE 60 MG Oral Cap DR La    Date of last refill: 08/23/21      Last office visit: 02/08/21  Due back to clinic per last office note:  na  Date next office visit scheduled:  na        Last OV note recommendation:     ASSESSMENT AND

## 2021-10-19 ENCOUNTER — MED REC SCAN ONLY (OUTPATIENT)
Dept: INTERNAL MEDICINE CLINIC | Facility: CLINIC | Age: 66
End: 2021-10-19

## 2021-11-09 ENCOUNTER — LAB ENCOUNTER (OUTPATIENT)
Dept: LAB | Age: 66
End: 2021-11-09
Attending: NURSE PRACTITIONER
Payer: MEDICARE

## 2021-11-09 DIAGNOSIS — Z12.5 SCREENING PSA (PROSTATE SPECIFIC ANTIGEN): ICD-10-CM

## 2021-11-09 DIAGNOSIS — N18.31 STAGE 3A CHRONIC KIDNEY DISEASE (HCC): ICD-10-CM

## 2021-11-09 PROCEDURE — 80053 COMPREHEN METABOLIC PANEL: CPT

## 2021-11-09 PROCEDURE — 36415 COLL VENOUS BLD VENIPUNCTURE: CPT

## 2021-12-22 DIAGNOSIS — M54.2 NECK PAIN: ICD-10-CM

## 2021-12-23 RX ORDER — DULOXETIN HYDROCHLORIDE 60 MG/1
CAPSULE, DELAYED RELEASE ORAL
Qty: 60 CAPSULE | Refills: 0 | Status: SHIPPED | OUTPATIENT
Start: 2021-12-23

## 2021-12-23 NOTE — TELEPHONE ENCOUNTER
Medication: DULOXETINE 60 MG Oral Cap DR La    Date of last refill: 10/14/21    Last office visit: 02/08/21  Due back to clinic per last office note:  na  Date next office visit scheduled:  na        Last OV note recommendation:       ASSESSMENT AND

## 2022-01-21 ENCOUNTER — OFFICE VISIT (OUTPATIENT)
Dept: INTERNAL MEDICINE CLINIC | Facility: CLINIC | Age: 67
End: 2022-01-21
Payer: MEDICARE

## 2022-01-21 VITALS
TEMPERATURE: 98 F | HEART RATE: 104 BPM | RESPIRATION RATE: 16 BRPM | OXYGEN SATURATION: 98 % | DIASTOLIC BLOOD PRESSURE: 68 MMHG | HEIGHT: 67.5 IN | BODY MASS INDEX: 36.33 KG/M2 | SYSTOLIC BLOOD PRESSURE: 118 MMHG | WEIGHT: 234.19 LBS

## 2022-01-21 DIAGNOSIS — M25.562 CHRONIC PAIN OF LEFT KNEE: ICD-10-CM

## 2022-01-21 DIAGNOSIS — R00.0 TACHYCARDIA: ICD-10-CM

## 2022-01-21 DIAGNOSIS — G25.81 RLS (RESTLESS LEGS SYNDROME): ICD-10-CM

## 2022-01-21 DIAGNOSIS — Z23 NEED FOR VACCINATION: ICD-10-CM

## 2022-01-21 DIAGNOSIS — R41.0 CONFUSION: ICD-10-CM

## 2022-01-21 DIAGNOSIS — E78.5 DYSLIPIDEMIA: ICD-10-CM

## 2022-01-21 DIAGNOSIS — I10 ESSENTIAL HYPERTENSION: Primary | ICD-10-CM

## 2022-01-21 DIAGNOSIS — R51.9 NONINTRACTABLE HEADACHE, UNSPECIFIED CHRONICITY PATTERN, UNSPECIFIED HEADACHE TYPE: ICD-10-CM

## 2022-01-21 DIAGNOSIS — G89.29 CHRONIC PAIN OF LEFT KNEE: ICD-10-CM

## 2022-01-21 PROCEDURE — 3008F BODY MASS INDEX DOCD: CPT | Performed by: INTERNAL MEDICINE

## 2022-01-21 PROCEDURE — 3078F DIAST BP <80 MM HG: CPT | Performed by: INTERNAL MEDICINE

## 2022-01-21 PROCEDURE — G0008 ADMIN INFLUENZA VIRUS VAC: HCPCS | Performed by: INTERNAL MEDICINE

## 2022-01-21 PROCEDURE — 3074F SYST BP LT 130 MM HG: CPT | Performed by: INTERNAL MEDICINE

## 2022-01-21 PROCEDURE — 99214 OFFICE O/P EST MOD 30 MIN: CPT | Performed by: NURSE PRACTITIONER

## 2022-01-21 PROCEDURE — 90662 IIV NO PRSV INCREASED AG IM: CPT | Performed by: INTERNAL MEDICINE

## 2022-01-21 RX ORDER — ROPINIROLE 1 MG/1
1 TABLET, FILM COATED ORAL 2 TIMES DAILY
Qty: 180 TABLET | Refills: 1 | Status: SHIPPED | OUTPATIENT
Start: 2022-01-21

## 2022-01-21 RX ORDER — ATORVASTATIN CALCIUM 80 MG/1
80 TABLET, FILM COATED ORAL NIGHTLY
Qty: 90 TABLET | Refills: 1 | Status: SHIPPED | OUTPATIENT
Start: 2022-01-21

## 2022-01-21 RX ORDER — LISINOPRIL 10 MG/1
10 TABLET ORAL DAILY
Qty: 90 TABLET | Refills: 1 | Status: SHIPPED | OUTPATIENT
Start: 2022-01-21

## 2022-01-21 RX ORDER — FENOFIBRATE 145 MG/1
145 TABLET, COATED ORAL DAILY
Qty: 90 TABLET | Refills: 1 | Status: SHIPPED | OUTPATIENT
Start: 2022-01-21

## 2022-01-21 RX ORDER — METOPROLOL SUCCINATE 25 MG/1
25 TABLET, EXTENDED RELEASE ORAL DAILY
Qty: 90 TABLET | Refills: 1 | Status: SHIPPED | OUTPATIENT
Start: 2022-01-21

## 2022-01-21 NOTE — PATIENT INSTRUCTIONS
Drink more water, less carffeine    Exercise more, improve diet- low sugar and low fat    Monitor confusion, headaches. If they worsen follow up as needed.      Continue to see the specialists including neurology- especially if the headaches and confusion c

## 2022-01-21 NOTE — PROGRESS NOTES
Lashell Cox is a 73year old male. CHIEF COMPLAINT   Med check, knee pain, other issues    HPI:   Patients daughter in law who helps care of him is present. Extra confusion this week per family. Called them more than usual forgetting details.  Lois Sky (benign prostatic hyperplasia) 9/19/2014   • CAD, multiple vessel 2005,7,12    nonocclusive   • Coronary atherosclerosis    • Diverticulitis    • High blood pressure    • HTN (hypertension)    • IGT (impaired glucose tolerance) 6/9/2015   • Other and unspe Component Value Date     (H) 11/09/2021    BUN 19 (H) 11/09/2021    BUNCREA 8.3 (L) 05/21/2021    CREATSERUM 1.77 (H) 11/09/2021    ANIONGAP 7 11/09/2021    GFR 37 (L) 02/02/2018    GFRNAA 39 (L) 11/09/2021    GFRAA 46 (L) 11/09/2021    CA 8.9 11/ pattern, unspecified headache type  - patient has slightly worse confusion this week with more headaches than usual. Is not drinking much water, but a lot of soda with caffeine. No symptoms of acute infection.  Neuro exam is at patients baseline.   - ian

## 2022-02-15 RX ORDER — DULOXETIN HYDROCHLORIDE 60 MG/1
60 CAPSULE, DELAYED RELEASE ORAL 2 TIMES DAILY
Qty: 60 CAPSULE | Refills: 0 | Status: SHIPPED | OUTPATIENT
Start: 2022-02-15

## 2022-02-25 ENCOUNTER — LAB ENCOUNTER (OUTPATIENT)
Dept: LAB | Age: 67
End: 2022-02-25
Attending: INTERNAL MEDICINE
Payer: MEDICARE

## 2022-02-25 ENCOUNTER — OFFICE VISIT (OUTPATIENT)
Dept: INTERNAL MEDICINE CLINIC | Facility: CLINIC | Age: 67
End: 2022-02-25
Payer: MEDICARE

## 2022-02-25 VITALS
DIASTOLIC BLOOD PRESSURE: 68 MMHG | OXYGEN SATURATION: 98 % | SYSTOLIC BLOOD PRESSURE: 114 MMHG | HEART RATE: 84 BPM | WEIGHT: 234 LBS | BODY MASS INDEX: 36.3 KG/M2 | HEIGHT: 67.5 IN | RESPIRATION RATE: 16 BRPM | TEMPERATURE: 98 F

## 2022-02-25 DIAGNOSIS — Z00.00 ENCOUNTER FOR ANNUAL HEALTH EXAMINATION: Primary | ICD-10-CM

## 2022-02-25 DIAGNOSIS — N40.0 BENIGN PROSTATIC HYPERPLASIA, UNSPECIFIED WHETHER LOWER URINARY TRACT SYMPTOMS PRESENT: ICD-10-CM

## 2022-02-25 DIAGNOSIS — Z86.73 HISTORY OF STROKE: ICD-10-CM

## 2022-02-25 DIAGNOSIS — R41.3 MEMORY DEFICIT: ICD-10-CM

## 2022-02-25 DIAGNOSIS — I65.29 STENOSIS OF CAROTID ARTERY, UNSPECIFIED LATERALITY: ICD-10-CM

## 2022-02-25 DIAGNOSIS — G44.86 CERVICOGENIC HEADACHE: ICD-10-CM

## 2022-02-25 DIAGNOSIS — E78.5 DYSLIPIDEMIA: ICD-10-CM

## 2022-02-25 DIAGNOSIS — G47.33 OSA (OBSTRUCTIVE SLEEP APNEA): ICD-10-CM

## 2022-02-25 DIAGNOSIS — I48.0 PAROXYSMAL ATRIAL FIBRILLATION (HCC): ICD-10-CM

## 2022-02-25 DIAGNOSIS — R42 DIZZINESS AND GIDDINESS: ICD-10-CM

## 2022-02-25 DIAGNOSIS — I69.30 SEQUELAE, POST-STROKE: ICD-10-CM

## 2022-02-25 DIAGNOSIS — I25.10 ATHEROSCLEROSIS OF NATIVE CORONARY ARTERY OF NATIVE HEART WITHOUT ANGINA PECTORIS: ICD-10-CM

## 2022-02-25 DIAGNOSIS — R73.02 IGT (IMPAIRED GLUCOSE TOLERANCE): ICD-10-CM

## 2022-02-25 DIAGNOSIS — N18.31 STAGE 3A CHRONIC KIDNEY DISEASE (HCC): ICD-10-CM

## 2022-02-25 DIAGNOSIS — F33.1 MAJOR DEPRESSIVE DISORDER, RECURRENT, MODERATE (HCC): ICD-10-CM

## 2022-02-25 DIAGNOSIS — E78.2 MIXED HYPERLIPIDEMIA: ICD-10-CM

## 2022-02-25 DIAGNOSIS — I10 PRIMARY HYPERTENSION: ICD-10-CM

## 2022-02-25 DIAGNOSIS — Z91.81 AT HIGH RISK FOR FALLS: ICD-10-CM

## 2022-02-25 DIAGNOSIS — G25.81 RLS (RESTLESS LEGS SYNDROME): ICD-10-CM

## 2022-02-25 PROBLEM — R07.9 CHEST PAIN: Status: RESOLVED | Noted: 2021-01-18 | Resolved: 2022-02-25

## 2022-02-25 PROBLEM — E87.2 METABOLIC ACIDOSIS: Status: RESOLVED | Noted: 2021-05-21 | Resolved: 2022-02-25

## 2022-02-25 PROBLEM — M47.816 LUMBAR SPONDYLOSIS: Status: ACTIVE | Noted: 2022-02-25

## 2022-02-25 PROBLEM — E87.1 HYPONATREMIA: Status: RESOLVED | Noted: 2021-05-21 | Resolved: 2022-02-25

## 2022-02-25 PROBLEM — E87.20 METABOLIC ACIDOSIS: Status: RESOLVED | Noted: 2021-05-21 | Resolved: 2022-02-25

## 2022-02-25 PROBLEM — I63.9 CEREBELLAR STROKE (HCC): Status: RESOLVED | Noted: 2017-08-21 | Resolved: 2022-02-25

## 2022-02-25 PROBLEM — I65.01 OCCLUSION OF RIGHT VERTEBRAL ARTERY: Status: RESOLVED | Noted: 2017-08-21 | Resolved: 2022-02-25

## 2022-02-25 PROBLEM — R73.9 HYPERGLYCEMIA: Status: RESOLVED | Noted: 2021-05-21 | Resolved: 2022-02-25

## 2022-02-25 LAB
ALBUMIN SERPL-MCNC: 4.3 G/DL (ref 3.4–5)
ALBUMIN/GLOB SERPL: 1.3 {RATIO} (ref 1–2)
ALP LIVER SERPL-CCNC: 53 U/L
ALT SERPL-CCNC: 26 U/L
ANION GAP SERPL CALC-SCNC: 6 MMOL/L (ref 0–18)
AST SERPL-CCNC: 21 U/L (ref 15–37)
BASOPHILS # BLD AUTO: 0.06 X10(3) UL (ref 0–0.2)
BASOPHILS NFR BLD AUTO: 0.6 %
BILIRUB SERPL-MCNC: 0.6 MG/DL (ref 0.1–2)
BUN BLD-MCNC: 17 MG/DL (ref 7–18)
CALCIUM BLD-MCNC: 9.5 MG/DL (ref 8.5–10.1)
CHLORIDE SERPL-SCNC: 108 MMOL/L (ref 98–112)
CHOLEST SERPL-MCNC: 117 MG/DL (ref ?–200)
CO2 SERPL-SCNC: 25 MMOL/L (ref 21–32)
CREAT BLD-MCNC: 1.82 MG/DL
EOSINOPHIL # BLD AUTO: 0.21 X10(3) UL (ref 0–0.7)
EOSINOPHIL NFR BLD AUTO: 2.2 %
ERYTHROCYTE [DISTWIDTH] IN BLOOD BY AUTOMATED COUNT: 12.5 %
EST. AVERAGE GLUCOSE BLD GHB EST-MCNC: 123 MG/DL (ref 68–126)
FASTING PATIENT LIPID ANSWER: YES
FASTING STATUS PATIENT QL REPORTED: YES
GLOBULIN PLAS-MCNC: 3.2 G/DL (ref 2.8–4.4)
GLUCOSE BLD-MCNC: 105 MG/DL (ref 70–99)
HBA1C MFR BLD: 5.9 % (ref ?–5.7)
HCT VFR BLD AUTO: 45.4 %
HDLC SERPL-MCNC: 26 MG/DL (ref 40–59)
HGB BLD-MCNC: 15 G/DL
IMM GRANULOCYTES # BLD AUTO: 0.03 X10(3) UL (ref 0–1)
IMM GRANULOCYTES NFR BLD: 0.3 %
LDLC SERPL CALC-MCNC: 46 MG/DL (ref ?–100)
LYMPHOCYTES # BLD AUTO: 2.49 X10(3) UL (ref 1–4)
LYMPHOCYTES NFR BLD AUTO: 25.7 %
MCH RBC QN AUTO: 32.4 PG (ref 26–34)
MCHC RBC AUTO-ENTMCNC: 33 G/DL (ref 31–37)
MCV RBC AUTO: 98.1 FL
MONOCYTES # BLD AUTO: 0.68 X10(3) UL (ref 0.1–1)
MONOCYTES NFR BLD AUTO: 7 %
NEUTROPHILS # BLD AUTO: 6.21 X10 (3) UL (ref 1.5–7.7)
NEUTROPHILS # BLD AUTO: 6.21 X10(3) UL (ref 1.5–7.7)
NEUTROPHILS NFR BLD AUTO: 64.2 %
NONHDLC SERPL-MCNC: 91 MG/DL (ref ?–130)
OSMOLALITY SERPL CALC.SUM OF ELEC: 290 MOSM/KG (ref 275–295)
PLATELET # BLD AUTO: 308 10(3)UL (ref 150–450)
POTASSIUM SERPL-SCNC: 4.9 MMOL/L (ref 3.5–5.1)
PROT SERPL-MCNC: 7.5 G/DL (ref 6.4–8.2)
RBC # BLD AUTO: 4.63 X10(6)UL
SODIUM SERPL-SCNC: 139 MMOL/L (ref 136–145)
TRIGL SERPL-MCNC: 293 MG/DL (ref 30–149)
TSI SER-ACNC: 1.62 MIU/ML (ref 0.36–3.74)
VLDLC SERPL CALC-MCNC: 41 MG/DL (ref 0–30)
WBC # BLD AUTO: 9.7 X10(3) UL (ref 4–11)

## 2022-02-25 PROCEDURE — 36415 COLL VENOUS BLD VENIPUNCTURE: CPT

## 2022-02-25 PROCEDURE — 84443 ASSAY THYROID STIM HORMONE: CPT

## 2022-02-25 PROCEDURE — 3078F DIAST BP <80 MM HG: CPT | Performed by: INTERNAL MEDICINE

## 2022-02-25 PROCEDURE — 3074F SYST BP LT 130 MM HG: CPT | Performed by: INTERNAL MEDICINE

## 2022-02-25 PROCEDURE — 96160 PT-FOCUSED HLTH RISK ASSMT: CPT | Performed by: INTERNAL MEDICINE

## 2022-02-25 PROCEDURE — 80061 LIPID PANEL: CPT

## 2022-02-25 PROCEDURE — 85025 COMPLETE CBC W/AUTO DIFF WBC: CPT

## 2022-02-25 PROCEDURE — 99397 PER PM REEVAL EST PAT 65+ YR: CPT | Performed by: INTERNAL MEDICINE

## 2022-02-25 PROCEDURE — 3008F BODY MASS INDEX DOCD: CPT | Performed by: INTERNAL MEDICINE

## 2022-02-25 PROCEDURE — 83036 HEMOGLOBIN GLYCOSYLATED A1C: CPT

## 2022-02-25 PROCEDURE — G0438 PPPS, INITIAL VISIT: HCPCS | Performed by: INTERNAL MEDICINE

## 2022-02-25 PROCEDURE — 80053 COMPREHEN METABOLIC PANEL: CPT

## 2022-03-07 ENCOUNTER — TELEPHONE (OUTPATIENT)
Dept: PHYSICAL THERAPY | Facility: HOSPITAL | Age: 67
End: 2022-03-07

## 2022-03-30 ENCOUNTER — LAB ENCOUNTER (OUTPATIENT)
Dept: LAB | Age: 67
End: 2022-03-30
Attending: INTERNAL MEDICINE
Payer: MEDICARE

## 2022-03-30 DIAGNOSIS — R79.89 ELEVATED SERUM CREATININE: ICD-10-CM

## 2022-03-30 LAB
ANION GAP SERPL CALC-SCNC: 8 MMOL/L (ref 0–18)
BUN BLD-MCNC: 24 MG/DL (ref 7–18)
CALCIUM BLD-MCNC: 9.3 MG/DL (ref 8.5–10.1)
CHLORIDE SERPL-SCNC: 108 MMOL/L (ref 98–112)
CO2 SERPL-SCNC: 22 MMOL/L (ref 21–32)
CREAT BLD-MCNC: 2.09 MG/DL
FASTING STATUS PATIENT QL REPORTED: YES
GLUCOSE BLD-MCNC: 105 MG/DL (ref 70–99)
OSMOLALITY SERPL CALC.SUM OF ELEC: 290 MOSM/KG (ref 275–295)
POTASSIUM SERPL-SCNC: 4.7 MMOL/L (ref 3.5–5.1)
SODIUM SERPL-SCNC: 138 MMOL/L (ref 136–145)

## 2022-03-30 PROCEDURE — 36415 COLL VENOUS BLD VENIPUNCTURE: CPT

## 2022-03-30 PROCEDURE — 80048 BASIC METABOLIC PNL TOTAL CA: CPT

## 2022-04-01 RX ORDER — FENOFIBRATE 54 MG/1
54 TABLET ORAL DAILY
Qty: 90 TABLET | Refills: 0 | Status: SHIPPED | OUTPATIENT
Start: 2022-04-01

## 2022-04-11 RX ORDER — ATORVASTATIN CALCIUM 80 MG/1
80 TABLET, FILM COATED ORAL NIGHTLY
Qty: 90 TABLET | Refills: 1 | OUTPATIENT
Start: 2022-04-11

## 2022-04-11 RX ORDER — DULOXETIN HYDROCHLORIDE 60 MG/1
60 CAPSULE, DELAYED RELEASE ORAL 2 TIMES DAILY
Qty: 60 CAPSULE | Refills: 0 | Status: SHIPPED | OUTPATIENT
Start: 2022-04-11

## 2022-05-03 ENCOUNTER — PATIENT MESSAGE (OUTPATIENT)
Dept: INTERNAL MEDICINE CLINIC | Facility: CLINIC | Age: 67
End: 2022-05-03

## 2022-05-03 NOTE — TELEPHONE ENCOUNTER
From: Pau Whiting  To: Itz Coffey MD  Sent: 5/3/2022 10:57 AM CDT  Subject: Blood work    When is the next blood test needed to check kidneys?

## 2022-05-20 RX ORDER — FENOFIBRATE 54 MG/1
54 TABLET ORAL DAILY
Qty: 90 TABLET | Refills: 0 | OUTPATIENT
Start: 2022-05-20

## 2022-05-20 NOTE — TELEPHONE ENCOUNTER
Last OV relevant to medication: 2/25/22 PE   Last refill date: 4/01/22 90     #/refills: 0  When pt was asked to return for OV: prn   Upcoming appt/reason: No future appointments.   Refill too soon

## 2022-06-03 ENCOUNTER — PATIENT OUTREACH (OUTPATIENT)
Dept: CASE MANAGEMENT | Age: 67
End: 2022-06-03

## 2022-06-07 ENCOUNTER — PATIENT OUTREACH (OUTPATIENT)
Dept: CASE MANAGEMENT | Age: 67
End: 2022-06-07

## 2022-06-27 NOTE — TELEPHONE ENCOUNTER
Addended by: EVERT CRAWFORD on: 6/27/2022 11:05 AM     Modules accepted: Orders     Form dropped off by patient for Long Term Disability. Dr. Avtar Larson already informed the patient that she will not fill out the LT disability form that it needs to go through neuro, but he was supposed to see a second specialist that Dr. Donna Acosta referred him to.

## 2022-07-02 DIAGNOSIS — M54.2 NECK PAIN: ICD-10-CM

## 2022-07-06 RX ORDER — DULOXETIN HYDROCHLORIDE 60 MG/1
60 CAPSULE, DELAYED RELEASE ORAL 2 TIMES DAILY
Qty: 60 CAPSULE | Refills: 0 | Status: SHIPPED | OUTPATIENT
Start: 2022-07-06

## 2022-07-06 NOTE — TELEPHONE ENCOUNTER
Medication: DULoxetine 60 MG Oral Cap DR La    Date of last refill: 4/11/22    Last office visit: 2/8/21  Due back to clinic per last office note:  n/a  Date next office visit scheduled: none      Last OV note recommendation:   The patient is a 71-year-old gentleman with a history of multiple neurological issues following up after recent radiofrequency ablation procedure. Unfortunately, the patient did not obtain any relief following the RFA. Given that he has has not had success with procedural interventions, I would not recommend any more injection treatments. We discussed medication management options but this is complicated by patient's history of CVA and dizziness issues. He is not able to tolerate gabapentin at 200 mg. He is continuing with Cymbalta. I did prescribe acetaminophen with codeine. He can try this with supervision to see if this can improve his headaches. Also referred him back to physical therapy. The patient also has a history of left total knee arthroplasty. He feels there is instability and clunking of the knee. His previous joint surgeon is no longer practicing. Therefore I referred him to Dr. Lawrence Palacios for evaluation.

## 2022-07-19 ENCOUNTER — PATIENT OUTREACH (OUTPATIENT)
Dept: CASE MANAGEMENT | Age: 67
End: 2022-07-19

## 2022-07-25 ENCOUNTER — LAB ENCOUNTER (OUTPATIENT)
Dept: LAB | Age: 67
End: 2022-07-25
Attending: INTERNAL MEDICINE
Payer: MEDICARE

## 2022-07-25 DIAGNOSIS — N28.9 KIDNEY FUNCTION ABNORMAL: ICD-10-CM

## 2022-07-25 LAB
ANION GAP SERPL CALC-SCNC: 3 MMOL/L (ref 0–18)
BUN BLD-MCNC: 27 MG/DL (ref 7–18)
CALCIUM BLD-MCNC: 9.2 MG/DL (ref 8.5–10.1)
CHLORIDE SERPL-SCNC: 107 MMOL/L (ref 98–112)
CO2 SERPL-SCNC: 23 MMOL/L (ref 21–32)
CREAT BLD-MCNC: 2.42 MG/DL
FASTING STATUS PATIENT QL REPORTED: YES
GLUCOSE BLD-MCNC: 121 MG/DL (ref 70–99)
OSMOLALITY SERPL CALC.SUM OF ELEC: 282 MOSM/KG (ref 275–295)
POTASSIUM SERPL-SCNC: 4.9 MMOL/L (ref 3.5–5.1)
SODIUM SERPL-SCNC: 133 MMOL/L (ref 136–145)

## 2022-07-25 PROCEDURE — 80048 BASIC METABOLIC PNL TOTAL CA: CPT

## 2022-07-25 PROCEDURE — 36415 COLL VENOUS BLD VENIPUNCTURE: CPT

## 2022-07-26 ENCOUNTER — PATIENT OUTREACH (OUTPATIENT)
Dept: CASE MANAGEMENT | Age: 67
End: 2022-07-26

## 2022-07-26 NOTE — PROGRESS NOTES
Called patient regarding assessment and left a message for patient to call back when they can. Reviewed patient chart. Left contact my contact number [phone].

## 2022-07-28 ENCOUNTER — OFFICE VISIT (OUTPATIENT)
Dept: INTERNAL MEDICINE CLINIC | Facility: CLINIC | Age: 67
End: 2022-07-28
Payer: MEDICARE

## 2022-07-28 VITALS
HEART RATE: 72 BPM | BODY MASS INDEX: 34.84 KG/M2 | RESPIRATION RATE: 16 BRPM | DIASTOLIC BLOOD PRESSURE: 68 MMHG | TEMPERATURE: 97 F | HEIGHT: 67 IN | OXYGEN SATURATION: 99 % | WEIGHT: 222 LBS | SYSTOLIC BLOOD PRESSURE: 116 MMHG

## 2022-07-28 DIAGNOSIS — E78.5 DYSLIPIDEMIA: ICD-10-CM

## 2022-07-28 DIAGNOSIS — R73.02 IGT (IMPAIRED GLUCOSE TOLERANCE): ICD-10-CM

## 2022-07-28 DIAGNOSIS — Z86.73 HISTORY OF STROKE: ICD-10-CM

## 2022-07-28 DIAGNOSIS — N18.32 STAGE 3B CHRONIC KIDNEY DISEASE (HCC): ICD-10-CM

## 2022-07-28 DIAGNOSIS — N40.0 BENIGN PROSTATIC HYPERPLASIA, UNSPECIFIED WHETHER LOWER URINARY TRACT SYMPTOMS PRESENT: ICD-10-CM

## 2022-07-28 DIAGNOSIS — G25.81 RLS (RESTLESS LEGS SYNDROME): ICD-10-CM

## 2022-07-28 DIAGNOSIS — R41.3 MEMORY DEFICIT: ICD-10-CM

## 2022-07-28 DIAGNOSIS — I25.10 ATHEROSCLEROSIS OF NATIVE CORONARY ARTERY OF NATIVE HEART WITHOUT ANGINA PECTORIS: ICD-10-CM

## 2022-07-28 DIAGNOSIS — M79.605 LEFT LEG PAIN: Primary | ICD-10-CM

## 2022-07-28 DIAGNOSIS — I10 PRIMARY HYPERTENSION: ICD-10-CM

## 2022-07-28 PROCEDURE — 99215 OFFICE O/P EST HI 40 MIN: CPT | Performed by: INTERNAL MEDICINE

## 2022-07-28 PROCEDURE — 3078F DIAST BP <80 MM HG: CPT | Performed by: INTERNAL MEDICINE

## 2022-07-28 PROCEDURE — 3008F BODY MASS INDEX DOCD: CPT | Performed by: INTERNAL MEDICINE

## 2022-07-28 PROCEDURE — 3074F SYST BP LT 130 MM HG: CPT | Performed by: INTERNAL MEDICINE

## 2022-07-28 NOTE — PATIENT INSTRUCTIONS
Call Dr Katie Hooper at the [facility] to see him about radiofrequency ablation for left leg pain recommended by Dr Aly Chavez, ortho  Stop the Fenofibrate, Lisinopril and Atorvastatin for the kidneys  Drink 6 large glasses of water daily  Do bloodwork in 1 week for kidneys  Schedule appt with Dr Larry Terry at [facility] in October  Schedule appt with Dr Donell Escobar urology for September

## 2022-08-04 ENCOUNTER — LAB ENCOUNTER (OUTPATIENT)
Dept: LAB | Age: 67
End: 2022-08-04
Attending: INTERNAL MEDICINE
Payer: MEDICARE

## 2022-08-04 DIAGNOSIS — R73.02 IGT (IMPAIRED GLUCOSE TOLERANCE): ICD-10-CM

## 2022-08-04 DIAGNOSIS — M79.605 LEFT LEG PAIN: ICD-10-CM

## 2022-08-04 DIAGNOSIS — N18.31 STAGE 3A CHRONIC KIDNEY DISEASE (HCC): ICD-10-CM

## 2022-08-04 LAB
ANION GAP SERPL CALC-SCNC: 8 MMOL/L (ref 0–18)
BILIRUB UR QL STRIP.AUTO: NEGATIVE
BUN BLD-MCNC: 21 MG/DL (ref 7–18)
CALCIUM BLD-MCNC: 9.4 MG/DL (ref 8.5–10.1)
CHLORIDE SERPL-SCNC: 105 MMOL/L (ref 98–112)
CLARITY UR REFRACT.AUTO: CLEAR
CO2 SERPL-SCNC: 23 MMOL/L (ref 21–32)
COLOR UR AUTO: YELLOW
CREAT BLD-MCNC: 1.91 MG/DL
FASTING STATUS PATIENT QL REPORTED: YES
GFR SERPLBLD BASED ON 1.73 SQ M-ARVRAT: 38 ML/MIN/1.73M2 (ref 60–?)
GLUCOSE BLD-MCNC: 122 MG/DL (ref 70–99)
GLUCOSE UR STRIP.AUTO-MCNC: NEGATIVE MG/DL
KETONES UR STRIP.AUTO-MCNC: NEGATIVE MG/DL
LEUKOCYTE ESTERASE UR QL STRIP.AUTO: NEGATIVE
NITRITE UR QL STRIP.AUTO: NEGATIVE
OSMOLALITY SERPL CALC.SUM OF ELEC: 286 MOSM/KG (ref 275–295)
PH UR STRIP.AUTO: 6 [PH] (ref 5–8)
POTASSIUM SERPL-SCNC: 5 MMOL/L (ref 3.5–5.1)
PROT UR STRIP.AUTO-MCNC: NEGATIVE MG/DL
RBC UR QL AUTO: NEGATIVE
SODIUM SERPL-SCNC: 136 MMOL/L (ref 136–145)
SP GR UR STRIP.AUTO: 1.02 (ref 1–1.03)
UROBILINOGEN UR STRIP.AUTO-MCNC: 0.2 MG/DL

## 2022-08-04 PROCEDURE — 81003 URINALYSIS AUTO W/O SCOPE: CPT

## 2022-08-04 PROCEDURE — 36415 COLL VENOUS BLD VENIPUNCTURE: CPT

## 2022-08-04 PROCEDURE — 80048 BASIC METABOLIC PNL TOTAL CA: CPT

## 2022-08-05 DIAGNOSIS — I10 PRIMARY HYPERTENSION: ICD-10-CM

## 2022-08-05 DIAGNOSIS — E78.5 DYSLIPIDEMIA: ICD-10-CM

## 2022-08-05 DIAGNOSIS — N18.32 STAGE 3B CHRONIC KIDNEY DISEASE (HCC): Primary | ICD-10-CM

## 2022-08-05 NOTE — PROGRESS NOTES
LMTCB for leora (on hippa)    BMP & FLP ordered to be completed in 1 month    Pt needs follow up appt in 6 months

## 2022-08-17 ENCOUNTER — OFFICE VISIT (OUTPATIENT)
Dept: PAIN CLINIC | Facility: CLINIC | Age: 67
End: 2022-08-17
Payer: MEDICARE

## 2022-08-17 VITALS
WEIGHT: 224 LBS | BODY MASS INDEX: 35 KG/M2 | SYSTOLIC BLOOD PRESSURE: 128 MMHG | HEART RATE: 94 BPM | OXYGEN SATURATION: 98 % | DIASTOLIC BLOOD PRESSURE: 74 MMHG

## 2022-08-17 DIAGNOSIS — M47.816 LUMBAR SPONDYLOSIS: Primary | ICD-10-CM

## 2022-08-17 DIAGNOSIS — R41.3 MEMORY DEFICIT: ICD-10-CM

## 2022-08-17 PROCEDURE — 3074F SYST BP LT 130 MM HG: CPT | Performed by: ANESTHESIOLOGY

## 2022-08-17 PROCEDURE — 99214 OFFICE O/P EST MOD 30 MIN: CPT | Performed by: ANESTHESIOLOGY

## 2022-08-17 PROCEDURE — 3078F DIAST BP <80 MM HG: CPT | Performed by: ANESTHESIOLOGY

## 2022-08-17 NOTE — PROGRESS NOTES
Patient presents in office today with reported pain in left knee    Current pain level reported = 10/10    Last reported dose of nothing today      Narcotic Contract renewal none    Urine Drug screen none

## 2022-08-23 ENCOUNTER — OFFICE VISIT (OUTPATIENT)
Dept: INTERNAL MEDICINE CLINIC | Facility: CLINIC | Age: 67
End: 2022-08-23
Payer: MEDICARE

## 2022-08-23 ENCOUNTER — PATIENT MESSAGE (OUTPATIENT)
Dept: INTERNAL MEDICINE CLINIC | Facility: CLINIC | Age: 67
End: 2022-08-23

## 2022-08-23 VITALS
RESPIRATION RATE: 14 BRPM | SYSTOLIC BLOOD PRESSURE: 120 MMHG | HEIGHT: 67 IN | BODY MASS INDEX: 36.54 KG/M2 | OXYGEN SATURATION: 96 % | WEIGHT: 232.81 LBS | DIASTOLIC BLOOD PRESSURE: 76 MMHG | TEMPERATURE: 98 F | HEART RATE: 82 BPM

## 2022-08-23 DIAGNOSIS — T07.XXXA ABRASIONS OF MULTIPLE SITES: Primary | ICD-10-CM

## 2022-08-23 DIAGNOSIS — M54.2 NECK PAIN: ICD-10-CM

## 2022-08-23 PROCEDURE — 3074F SYST BP LT 130 MM HG: CPT | Performed by: NURSE PRACTITIONER

## 2022-08-23 PROCEDURE — 3078F DIAST BP <80 MM HG: CPT | Performed by: NURSE PRACTITIONER

## 2022-08-23 PROCEDURE — 99213 OFFICE O/P EST LOW 20 MIN: CPT | Performed by: NURSE PRACTITIONER

## 2022-08-23 PROCEDURE — 3008F BODY MASS INDEX DOCD: CPT | Performed by: NURSE PRACTITIONER

## 2022-08-23 NOTE — PATIENT INSTRUCTIONS
Clean the wounds with soap and water. Apply neosporin and a bandage a couple times a day.  Monitor for healing and signs of infection    Montior for signs of infection (redness, warmth, swelling, drainage, pain) after sugery    Follow up as needed or when routine care is due

## 2022-08-24 ENCOUNTER — PATIENT OUTREACH (OUTPATIENT)
Dept: CASE MANAGEMENT | Age: 67
End: 2022-08-24

## 2022-08-24 DIAGNOSIS — I10 PRIMARY HYPERTENSION: ICD-10-CM

## 2022-08-24 DIAGNOSIS — G47.33 OSA (OBSTRUCTIVE SLEEP APNEA): ICD-10-CM

## 2022-08-24 DIAGNOSIS — F33.1 MAJOR DEPRESSIVE DISORDER, RECURRENT, MODERATE (HCC): ICD-10-CM

## 2022-08-24 DIAGNOSIS — M47.816 LUMBAR SPONDYLOSIS: ICD-10-CM

## 2022-08-24 DIAGNOSIS — G44.86 CERVICOGENIC HEADACHE: ICD-10-CM

## 2022-08-24 DIAGNOSIS — E78.5 DYSLIPIDEMIA: ICD-10-CM

## 2022-08-24 DIAGNOSIS — R41.3 MEMORY DEFICIT: ICD-10-CM

## 2022-08-24 DIAGNOSIS — N18.32 STAGE 3B CHRONIC KIDNEY DISEASE (HCC): ICD-10-CM

## 2022-08-24 RX ORDER — AMLODIPINE BESYLATE 5 MG/1
5 TABLET ORAL DAILY
Qty: 30 TABLET | Refills: 0 | Status: SHIPPED | OUTPATIENT
Start: 2022-08-24 | End: 2022-09-23

## 2022-08-24 RX ORDER — DULOXETIN HYDROCHLORIDE 60 MG/1
60 CAPSULE, DELAYED RELEASE ORAL 2 TIMES DAILY
Qty: 60 CAPSULE | Refills: 0 | Status: SHIPPED | OUTPATIENT
Start: 2022-08-24

## 2022-08-24 NOTE — TELEPHONE ENCOUNTER
From: Connie Dill  To: [facility], APRN  Sent: 8/23/2022 9:56 AM CDT  Subject: Rash    Picture of rash requested

## 2022-08-25 RX ORDER — METOPROLOL SUCCINATE 25 MG/1
25 TABLET, EXTENDED RELEASE ORAL DAILY
Qty: 90 TABLET | Refills: 0 | Status: SHIPPED | OUTPATIENT
Start: 2022-08-25 | End: 2022-12-28

## 2022-08-25 RX ORDER — ROPINIROLE 1 MG/1
1 TABLET, FILM COATED ORAL 2 TIMES DAILY
Qty: 180 TABLET | Refills: 1 | Status: SHIPPED | OUTPATIENT
Start: 2022-08-25 | End: 2023-03-09

## 2022-08-25 NOTE — TELEPHONE ENCOUNTER
Last OV relevant to medication: 02/25/2022  Last refill date: 01/21/22     #/refills: 180/1  When pt was asked to return for OV: Unknown  Upcoming appt/reason: Nothing scheduled  Was pt informed of any over due labs: Nothing outstanding.

## 2022-08-26 RX ORDER — ASPIRIN 325 MG
325 TABLET ORAL DAILY
Qty: 90 TABLET | Refills: 1 | Status: SHIPPED | OUTPATIENT
Start: 2022-08-26

## 2022-08-26 NOTE — TELEPHONE ENCOUNTER
Last OV relevant to medication: 8/23/22 - Acute, 2/25/22 - Supervisit  Last refill date: 8/26/2021     #/refills: 90/1  When pt was asked to return for OV: 1 yr or PRN  Upcoming appt/reason: None  Was pt informed of any over due labs: No, not due until on or after 9/5/22

## 2022-09-15 ENCOUNTER — HOSPITAL ENCOUNTER (OUTPATIENT)
Dept: MRI IMAGING | Age: 67
Discharge: HOME | End: 2022-09-15
Attending: ANESTHESIOLOGY
Payer: MEDICARE

## 2022-09-15 DIAGNOSIS — M47.816 LUMBAR SPONDYLOSIS: ICD-10-CM

## 2022-09-15 PROCEDURE — 72148 MRI LUMBAR SPINE W/O DYE: CPT | Performed by: ANESTHESIOLOGY

## 2022-09-21 ENCOUNTER — PATIENT OUTREACH (OUTPATIENT)
Dept: CASE MANAGEMENT | Age: 67
End: 2022-09-21

## 2022-09-21 NOTE — PROGRESS NOTES
Called patient regarding ccm monthly and left a message for patient to call back when they can. Reviewed patient chart. Left contact my contact number [phone].        Time Spent This Encounter Total: 3  min medical record review  Monthly Minute Total including today: 3 minutes

## 2022-09-22 NOTE — PROGRESS NOTES
Called patient regarding ccm monthly and left a message for patient to call back when they can. Reviewed patient chart. Left contact my contact number [phone].        Time Spent This Encounter Total: 3  min medical record review  Monthly Minute Total including today: 3 minutes

## 2022-09-30 ENCOUNTER — PATIENT OUTREACH (OUTPATIENT)
Dept: CASE MANAGEMENT | Age: 67
End: 2022-09-30

## 2022-09-30 NOTE — PROGRESS NOTES
Called patient regarding ccm monthly and left a message for patient to call back when they can. Reviewed patient chart. Left contact my contact number [phone].

## 2022-10-05 ENCOUNTER — OFFICE VISIT (OUTPATIENT)
Dept: PAIN CLINIC | Facility: CLINIC | Age: 67
End: 2022-10-05
Payer: MEDICARE

## 2022-10-05 ENCOUNTER — TELEPHONE (OUTPATIENT)
Dept: NEUROLOGY | Facility: CLINIC | Age: 67
End: 2022-10-05

## 2022-10-05 VITALS
DIASTOLIC BLOOD PRESSURE: 76 MMHG | WEIGHT: 232 LBS | SYSTOLIC BLOOD PRESSURE: 120 MMHG | BODY MASS INDEX: 36 KG/M2 | HEART RATE: 84 BPM | OXYGEN SATURATION: 98 %

## 2022-10-05 DIAGNOSIS — M51.36 DDD (DEGENERATIVE DISC DISEASE), LUMBAR: Primary | ICD-10-CM

## 2022-10-05 DIAGNOSIS — G89.29 CHRONIC PAIN OF LEFT KNEE: ICD-10-CM

## 2022-10-05 DIAGNOSIS — M25.562 CHRONIC PAIN OF LEFT KNEE: ICD-10-CM

## 2022-10-05 DIAGNOSIS — M47.816 LUMBAR SPONDYLOSIS: ICD-10-CM

## 2022-10-05 DIAGNOSIS — M54.2 NECK PAIN: ICD-10-CM

## 2022-10-05 PROCEDURE — 3078F DIAST BP <80 MM HG: CPT | Performed by: ANESTHESIOLOGY

## 2022-10-05 PROCEDURE — 3074F SYST BP LT 130 MM HG: CPT | Performed by: ANESTHESIOLOGY

## 2022-10-05 PROCEDURE — 99214 OFFICE O/P EST MOD 30 MIN: CPT | Performed by: ANESTHESIOLOGY

## 2022-10-05 RX ORDER — LISINOPRIL 10 MG/1
TABLET ORAL
COMMUNITY
Start: 2022-09-09

## 2022-10-05 RX ORDER — AMLODIPINE BESYLATE 5 MG/1
5 TABLET ORAL DAILY
Qty: 90 TABLET | Refills: 0 | Status: SHIPPED | OUTPATIENT
Start: 2022-10-05 | End: 2022-11-04

## 2022-10-05 RX ORDER — DULOXETIN HYDROCHLORIDE 60 MG/1
60 CAPSULE, DELAYED RELEASE ORAL DAILY
Qty: 30 CAPSULE | Refills: 0 | Status: SHIPPED | OUTPATIENT
Start: 2022-10-05

## 2022-10-05 NOTE — TELEPHONE ENCOUNTER
Last OV relevant to medication: 02/25/2022 PE has been seen for acute more recently  Last refill date: 08/24/2022    #/refills: 30-0  When pt was asked to return for OV: when routine care is due  Upcoming appt/reason: n/a  Was pt informed of any over due labs: yes, Mychart sent.  Unable to reach pt or caregiver for lab results and additional labs  Lab Results   Component Value Date     (H) 08/04/2022    BUN 21 (H) 08/04/2022    BUNCREA 8.3 (L) 05/21/2021    CREATSERUM 1.91 (H) 08/04/2022    ANIONGAP 8 08/04/2022    GFR 37 (L) 02/02/2018    GFRNAA 27 (L) 07/25/2022    GFRAA 31 (L) 07/25/2022    CA 9.4 08/04/2022    OSMOCALC 286 08/04/2022    ALKPHO 53 02/25/2022    AST 21 02/25/2022    ALT 26 02/25/2022    BILT 0.6 02/25/2022    TP 7.5 02/25/2022    ALB 4.3 02/25/2022    GLOBULIN 3.2 02/25/2022    AGRATIO 1.7 03/22/2016     08/04/2022    K 5.0 08/04/2022     08/04/2022    CO2 23.0 08/04/2022

## 2022-10-05 NOTE — PROGRESS NOTES
Patient presents in office today with reported pain in low back and left knee     Current pain level reported = 9/10 knee, 8/10 low back    Last reported dose of yesterday, unknown      Narcotic Contract renewal none    Urine Drug screen none

## 2022-10-05 NOTE — TELEPHONE ENCOUNTER
Prior authorization request completed for: POONAM    Authorization # [id number]   Authorization dates: 10/5/2022 - 1/3/2023  CPT codes approved: 44107  Number of visits/dates of service approved: 1  Physician: Dr. Amadou Villaseñor   Location: THE [facility]     Patient can be scheduled. Routed to Navigator.

## 2022-10-05 NOTE — TELEPHONE ENCOUNTER
Prior authorization request completed for: Left genicular NB   Authorization # NO PRIOR AUTHORIZATION REQUIRED  Authorization dates: N/A  CPT codes approved: 64811   Number of visits/dates of service approved: 1  Physician: Dr. Meridee Cushing   Location: THE [facility]     Patient can be scheduled. Routed to Navigator.      Does not require authorization  45321  Injection(s), anesthetic agent(s) and/or steroid; genicular nerve branches, including imaging guidance, when performed

## 2022-10-06 NOTE — TELEPHONE ENCOUNTER
Spoke with Stephen at [facility] who cannot give medical clearance until patient is scheduled for injection, advised Stephen that our office protocol cannot schedule until medical clearance is received and patient would be scheduled within 30 days. C/B Phone # is [phone]    Please advise.

## 2022-10-06 NOTE — TELEPHONE ENCOUNTER
10/06/22    RE: Yanni Montes    :     Dear Chandana Culp    Your patient is being scheduled for a pain management procedure at [facility].    Procedure:  Lumbar Epidural Steroid Injection   Date of Procedure: TBD  Physician: - Anesthesiologist     Your patient is currently taking Aspirin 325mg.  usually recommends the medication be held for 7 days prior to injection. Please verify patient is cleared to proceed with pain management procedures.

## 2022-10-07 DIAGNOSIS — E78.5 DYSLIPIDEMIA: ICD-10-CM

## 2022-10-07 RX ORDER — ATORVASTATIN CALCIUM 80 MG/1
80 TABLET, FILM COATED ORAL NIGHTLY
Qty: 90 TABLET | Refills: 0 | OUTPATIENT
Start: 2022-10-07

## 2022-10-11 ENCOUNTER — TELEPHONE (OUTPATIENT)
Dept: INTERNAL MEDICINE CLINIC | Facility: CLINIC | Age: 67
End: 2022-10-11

## 2022-10-11 NOTE — TELEPHONE ENCOUNTER
10/11/22    RE: Eric Lazar    : [birth year]    Dear Shivani Lu    Your patient is being scheduled for a pain management procedure at [facility].    Procedure:  Lumbar Epidural Steroid Injection   Date of Procedure: Patient will be scheduled within 30 days of receiving medical clearance. Physician: Ayah Flower- Anesthesiologist     Your patient is currently taking Aspirin 325mg.  usually recommends the medication be held for 7 days prior to injection. Please verify patient is cleared to proceed with pain management procedures.

## 2022-10-11 NOTE — TELEPHONE ENCOUNTER
Date of pre-op appt:  10/18/22  Provider pre-op scheduled with: Micheal Sandoval   Surgeon's name: Mainor Maria   Phone #:  [phone]   Fax #:  [phone]  Surgery date:  TBD need clearance before can schedule. guarantee can get in within 30 days .   Procedure/diagnosis:  Lumber epidural steroid injection

## 2022-10-11 NOTE — TELEPHONE ENCOUNTER
Contacted [address] [place], [place] [address]sarah's office to discuss med clearance. Spoke to Stephen to explain that med clearance must be obtained prior to scheduling as we need to know it is safe for pt to hold blood thinning meds prior to moving forward w/ procedure. Further explained that we provide instructions to hold blood thinning medications at the time of scheduling and we cannot advise a pt to hold a medication that we do not manage. Advised that we can send a new request and include that pt will be scheduled w/in 30 days of rcv'ing clearance, offered to speak to someone else if necessary. Stephen placed the call on hold. Stephen returned to the line and states that pt will be scheduled w/ their office so that he can complete a pre-op visit. Asked that once pt is seen, the medical clearance letter is faxed back to our office. Stephen MACHADO. Offered to include that pt will be scheduled w/in 30 days. Stephen requests that info is included as well.

## 2022-10-11 NOTE — TELEPHONE ENCOUNTER
Medical Clearance re faxed to [facility]  [facility]. at fax #: [phone];GIANNAYRJAN vinson pt having vomiting and diarrhea. crying tears. Pt having one episode emesis in triage after crying. + tears, + MMM. Apical heart rate auscultated.

## 2022-10-13 NOTE — TELEPHONE ENCOUNTER
Adolfo Blancas from Dr. Joseph Lam office called to advise that they can't complete medical clearance without patient being scheduled for procedure. Transferred call to [place] to better explain.

## 2022-10-13 NOTE — TELEPHONE ENCOUNTER
Spoke w Ana Lilia Dixon   She would like this patient to contact his cardiologist prior to this procedure to make sure he is ok with pt holding 325mg aspirin for 7 days prior to procedure   Spoke w brock and leora per hipaa informed them of katies request for pt to see cardiology instead of her   Cancelled pt appt informed brock to schedule w cardiology instead  [facility] cardiology [phone] pt cardiologist is dr Guillermina Saldana form over to dr Krishna Shaw [phone], confirmed   informed staff of pt situation and why he needs appt w cardiology   Magda Chan is aware to call and schedule appt w cardiologist for pt

## 2022-10-14 ENCOUNTER — TELEPHONE (OUTPATIENT)
Dept: CARDIOLOGY | Age: 67
End: 2022-10-14

## 2022-10-18 ENCOUNTER — PATIENT MESSAGE (OUTPATIENT)
Dept: INTERNAL MEDICINE CLINIC | Facility: CLINIC | Age: 67
End: 2022-10-18

## 2022-10-18 ENCOUNTER — LAB ENCOUNTER (OUTPATIENT)
Dept: LAB | Age: 67
End: 2022-10-18
Attending: INTERNAL MEDICINE
Payer: MEDICARE

## 2022-10-18 DIAGNOSIS — E78.5 DYSLIPIDEMIA: ICD-10-CM

## 2022-10-18 DIAGNOSIS — N18.32 STAGE 3B CHRONIC KIDNEY DISEASE (HCC): ICD-10-CM

## 2022-10-18 DIAGNOSIS — I10 PRIMARY HYPERTENSION: ICD-10-CM

## 2022-10-18 DIAGNOSIS — E78.00 ELEVATED CHOLESTEROL: Primary | ICD-10-CM

## 2022-10-18 LAB
ANION GAP SERPL CALC-SCNC: 8 MMOL/L (ref 0–18)
BUN BLD-MCNC: 14 MG/DL (ref 7–18)
CALCIUM BLD-MCNC: 9.4 MG/DL (ref 8.5–10.1)
CHLORIDE SERPL-SCNC: 106 MMOL/L (ref 98–112)
CHOLEST SERPL-MCNC: 196 MG/DL (ref ?–200)
CO2 SERPL-SCNC: 24 MMOL/L (ref 21–32)
CREAT BLD-MCNC: 1.62 MG/DL
FASTING PATIENT LIPID ANSWER: YES
FASTING STATUS PATIENT QL REPORTED: YES
GFR SERPLBLD BASED ON 1.73 SQ M-ARVRAT: 47 ML/MIN/1.73M2 (ref 60–?)
GLUCOSE BLD-MCNC: 131 MG/DL (ref 70–99)
HDLC SERPL-MCNC: 26 MG/DL (ref 40–59)
LDLC SERPL CALC-MCNC: 113 MG/DL (ref ?–100)
NONHDLC SERPL-MCNC: 170 MG/DL (ref ?–130)
OSMOLALITY SERPL CALC.SUM OF ELEC: 288 MOSM/KG (ref 275–295)
POTASSIUM SERPL-SCNC: 4.5 MMOL/L (ref 3.5–5.1)
SODIUM SERPL-SCNC: 138 MMOL/L (ref 136–145)
TRIGL SERPL-MCNC: 328 MG/DL (ref 30–149)
VLDLC SERPL CALC-MCNC: 58 MG/DL (ref 0–30)

## 2022-10-18 PROCEDURE — 36415 COLL VENOUS BLD VENIPUNCTURE: CPT

## 2022-10-18 PROCEDURE — 80061 LIPID PANEL: CPT

## 2022-10-18 PROCEDURE — 80048 BASIC METABOLIC PNL TOTAL CA: CPT

## 2022-10-19 ENCOUNTER — PATIENT MESSAGE (OUTPATIENT)
Dept: INTERNAL MEDICINE CLINIC | Facility: CLINIC | Age: 67
End: 2022-10-19

## 2022-10-19 ENCOUNTER — PATIENT OUTREACH (OUTPATIENT)
Dept: CASE MANAGEMENT | Age: 67
End: 2022-10-19

## 2022-10-19 DIAGNOSIS — I10 PRIMARY HYPERTENSION: Primary | ICD-10-CM

## 2022-10-19 DIAGNOSIS — R00.0 TACHYCARDIA: ICD-10-CM

## 2022-10-19 DIAGNOSIS — I65.29 STENOSIS OF CAROTID ARTERY, UNSPECIFIED LATERALITY: ICD-10-CM

## 2022-10-19 DIAGNOSIS — I48.0 PAROXYSMAL ATRIAL FIBRILLATION (HCC): ICD-10-CM

## 2022-10-19 RX ORDER — ATORVASTATIN CALCIUM 40 MG/1
40 TABLET, FILM COATED ORAL NIGHTLY
Qty: 90 TABLET | Refills: 0 | Status: SHIPPED | OUTPATIENT
Start: 2022-10-19

## 2022-10-19 NOTE — TELEPHONE ENCOUNTER
Spoke to brock per hipaa how much Lipitor do you want to send pt?  Pended 90ds below   Contacted referral department to have referral authorized high priority pt appt is Friday

## 2022-10-19 NOTE — TELEPHONE ENCOUNTER
Confirmed x2  Informed brock referral is authorized she can see it in the referrals menu in [facility]   Informed her referral was faxed to both numbers and confirmation was received for both of them   Informed her ex was sent for Lipitor 40mg to [facility] and future refills will be given once his next set of labs come back   Ebbie Philip verbalized understanding and had no additional questions   Confirmations in triage hold for appt since pt appt is Friday w cards

## 2022-10-19 NOTE — PROGRESS NOTES
Called patient regarding CCM monthly and left a message for patient to call back when they can. Reviewed patient chart. Left contact my contact number [phone].        Time Spent This Encounter Total: 3  min medical record review  Monthly Minute Total including today: 3 minutes

## 2022-10-19 NOTE — TELEPHONE ENCOUNTER
Spoke to Master 90ds sent since pt will need fresh set of labs to make sure he his on the right dose   Spoke to Edward awaiting referral auth   To fax to [phone] and [phone]   Will notify brock once referral is authorized and faxed

## 2022-10-19 NOTE — TELEPHONE ENCOUNTER
Got it. I read Dr. Chelita White note from February that stated he was still on it. I do see her note in July did take him off it temporarily due to his kidneys. His kidney function has improved with better hydration. Please restart Lipitor just 40 mg nightly instead of 80 as his cholesterol is just slightly elevated. We can keep the fenofibrate off for now. We will see how his repeat labs are in a few months to monitor. Thank you.

## 2022-10-19 NOTE — TELEPHONE ENCOUNTER
From: Sukhwinder Real  To: TOBIN Severino  Sent: 10/19/2022 9:55 AM CDT  Subject: Referral    Kevin Plata needs a referral sent over to Dr Rose Neves office before Fridays appointment with him. Im pretty sure this was done already but they just called asking for it. Their fax is [phone].      Thank you

## 2022-10-19 NOTE — TELEPHONE ENCOUNTER
Nano message received back from patient regarding blood work results. Patient states that Dr. Robie Rinne took him off of his cholesterol medications. Should he restart?

## 2022-10-21 ENCOUNTER — TELEPHONE (OUTPATIENT)
Dept: INTERNAL MEDICINE CLINIC | Facility: CLINIC | Age: 67
End: 2022-10-21

## 2022-10-21 ENCOUNTER — OFFICE VISIT (OUTPATIENT)
Dept: CARDIOLOGY | Age: 67
End: 2022-10-21

## 2022-10-21 VITALS
BODY MASS INDEX: 37.61 KG/M2 | HEART RATE: 74 BPM | DIASTOLIC BLOOD PRESSURE: 83 MMHG | WEIGHT: 234 LBS | HEIGHT: 66 IN | SYSTOLIC BLOOD PRESSURE: 132 MMHG

## 2022-10-21 DIAGNOSIS — R07.9 CHEST PAIN, UNSPECIFIED TYPE: ICD-10-CM

## 2022-10-21 DIAGNOSIS — E78.2 MIXED HYPERLIPIDEMIA: Primary | ICD-10-CM

## 2022-10-21 DIAGNOSIS — I25.10 ATHEROSCLEROSIS OF NATIVE CORONARY ARTERY WITHOUT ANGINA PECTORIS, UNSPECIFIED WHETHER NATIVE OR TRANSPLANTED HEART: ICD-10-CM

## 2022-10-21 DIAGNOSIS — I65.29 STENOSIS OF CAROTID ARTERY, UNSPECIFIED LATERALITY: ICD-10-CM

## 2022-10-21 PROCEDURE — 99214 OFFICE O/P EST MOD 30 MIN: CPT | Performed by: INTERNAL MEDICINE

## 2022-10-21 RX ORDER — ATORVASTATIN CALCIUM 40 MG/1
80 TABLET, FILM COATED ORAL DAILY
Qty: 30 TABLET | Refills: 11 | Status: ON HOLD | OUTPATIENT
Start: 2022-10-21 | End: 2023-05-16 | Stop reason: HOSPADM

## 2022-10-21 RX ORDER — EZETIMIBE 10 MG/1
10 TABLET ORAL DAILY
Qty: 90 TABLET | Refills: 3 | Status: SHIPPED | OUTPATIENT
Start: 2022-10-21 | End: 2022-10-21 | Stop reason: ALTCHOICE

## 2022-10-21 RX ORDER — ATORVASTATIN CALCIUM 40 MG/1
TABLET, FILM COATED ORAL DAILY
COMMUNITY
Start: 2022-10-19 | End: 2022-10-21 | Stop reason: SDUPTHER

## 2022-10-21 RX ORDER — AMLODIPINE BESYLATE 5 MG/1
5 TABLET ORAL DAILY
Status: ON HOLD | COMMUNITY
Start: 2022-10-05 | End: 2023-05-16 | Stop reason: HOSPADM

## 2022-10-21 RX ORDER — ROPINIROLE 1 MG/1
1 TABLET, FILM COATED ORAL 2 TIMES DAILY
COMMUNITY
Start: 2022-08-25

## 2022-10-21 NOTE — TELEPHONE ENCOUNTER
LMTCB for patient's cousin Prudy Centers ([address] per [facility]IP[facility]) regarding clarification on cardiologist for medical clearance.

## 2022-10-21 NOTE — TELEPHONE ENCOUNTER
Care Everywhere Records Received    Updated Care Everywhere: yes     Date of Service: 10/21/22    From What Facility: [facility]     Speciality: cardio Dr David Sarmiento    Type of Record: OV     Document Placed:Janice conley in folder

## 2022-10-21 NOTE — TELEPHONE ENCOUNTER
Received Medical Clearance from [address] [place] [address],[address] at [facility] 19 patient to hold Aspirin 325mg 5-7 days prior to injection.

## 2022-10-27 ENCOUNTER — E-ADVICE (OUTPATIENT)
Dept: CARDIOLOGY | Age: 67
End: 2022-10-27

## 2022-11-03 ENCOUNTER — APPOINTMENT (OUTPATIENT)
Dept: GENERAL RADIOLOGY | Facility: HOSPITAL | Age: 67
End: 2022-11-03
Attending: ANESTHESIOLOGY
Payer: MEDICARE

## 2022-11-03 ENCOUNTER — HOSPITAL ENCOUNTER (OUTPATIENT)
Facility: HOSPITAL | Age: 67
Setting detail: HOSPITAL OUTPATIENT SURGERY
Discharge: HOME | End: 2022-11-03
Attending: ANESTHESIOLOGY | Admitting: ANESTHESIOLOGY
Payer: MEDICARE

## 2022-11-03 VITALS
HEIGHT: 67 IN | OXYGEN SATURATION: 98 % | SYSTOLIC BLOOD PRESSURE: 119 MMHG | WEIGHT: 232 LBS | RESPIRATION RATE: 22 BRPM | DIASTOLIC BLOOD PRESSURE: 79 MMHG | HEART RATE: 100 BPM | BODY MASS INDEX: 36.41 KG/M2 | TEMPERATURE: 98 F

## 2022-11-03 PROCEDURE — 3E0R3BZ INTRODUCTION OF ANESTHETIC AGENT INTO SPINAL CANAL, PERCUTANEOUS APPROACH: ICD-10-PCS | Performed by: ANESTHESIOLOGY

## 2022-11-03 PROCEDURE — 62323 NJX INTERLAMINAR LMBR/SAC: CPT | Performed by: ANESTHESIOLOGY

## 2022-11-03 PROCEDURE — 3E0R37Z INTRODUCTION OF ELECTROLYTIC AND WATER BALANCE SUBSTANCE INTO SPINAL CANAL, PERCUTANEOUS APPROACH: ICD-10-PCS | Performed by: ANESTHESIOLOGY

## 2022-11-03 PROCEDURE — B01B1ZZ FLUOROSCOPY OF SPINAL CORD USING LOW OSMOLAR CONTRAST: ICD-10-PCS | Performed by: ANESTHESIOLOGY

## 2022-11-03 PROCEDURE — 3E0R33Z INTRODUCTION OF ANTI-INFLAMMATORY INTO SPINAL CANAL, PERCUTANEOUS APPROACH: ICD-10-PCS | Performed by: ANESTHESIOLOGY

## 2022-11-03 RX ORDER — DEXAMETHASONE SODIUM PHOSPHATE 10 MG/ML
INJECTION, SOLUTION INTRAMUSCULAR; INTRAVENOUS
Status: DISCONTINUED | OUTPATIENT
Start: 2022-11-03 | End: 2022-11-03

## 2022-11-03 RX ORDER — SODIUM CHLORIDE 9 MG/ML
INJECTION INTRAVENOUS
Status: DISCONTINUED | OUTPATIENT
Start: 2022-11-03 | End: 2022-11-03

## 2022-11-03 RX ORDER — LIDOCAINE HYDROCHLORIDE 10 MG/ML
INJECTION, SOLUTION EPIDURAL; INFILTRATION; INTRACAUDAL; PERINEURAL
Status: DISCONTINUED | OUTPATIENT
Start: 2022-11-03 | End: 2022-11-03

## 2022-11-03 NOTE — OPERATIVE REPORT
[facility]  Operative Report  11/3/2022     Alvin Bustillo Patient Status:  Hospital Outpatient Surgery    1955 MRN [id number]   Location [address] [address] Attending Myra Hernandez MD   Hosp Day # 0 SALVADOR Roque MD     Indication: Jossy Beebe is a 78year old male with lumbar degenerative disc disease    Preoperative Diagnosis:  DDD (degenerative disc disease), lumbar [M51.36]    Postoperative Diagnosis: Same as above. Procedure performed: LUMBAR INTERLAMINAR EPIDURAL INJECTION with local     Anesthesia: Local .    EBL: Less than 1 ml. Procedure Description:  After reviewing the patient's history and performing a focused physical examination, the diagnosis and positive previous diagnostic tests were confirmed and contraindications such as infection and coagulopathy were ruled out. Following review of allergies and potential side effects and complications, including but not necessarily limited to infection, allergic reaction, local tissue breakdown, nerve injury, post-dural puncture headache and paresis, the patient consented for the procedure. The patient was brought to the procedure room in prone position. after sterile prep lumbar spine, L5-S1 interspace was identified with the help of fluoroscopy. Local anesthetic was given by a 25 gauge 1.5 inch needle with 1% lidocaine in that space level. Thereafter, a 20 gauge Tuohy needle was introduced and advanced under fluoroscopy. The epidural space was accessed by using loss of resistance to air technique. The needle position was confirmed with AP and lateral view under fluoroscopy. Omnipaque 180 was injected in 1 mL volume. A good epidurogram was obtained. Thereafter, dexamethasone 10 mg with normal saline of 5 mL in total volume of 6 mL was injected through the Tuohy needle. The needle was flushed with 1 mL lidocaine. The needle was withdrawn with the stylet intact in situ.   The needle's tip was intact. The patient tolerated the procedure very well without significant immediate complication. The patient's back was cleaned and sterile dressing was applied. The patient was discharged with an instruction to a responsible adult after discharge criteria were met. The patient was advised to contact us should any problems happen. The patient was also informed to go to the emergency room immediately if experiencing severe numbness or weakness in the extremities or experiencing bowel or bladder incontinence. Complications: None. Follow up: The patient was followed in the pain clinic as needed basis.           Mary Anne Walsh MD

## 2022-11-03 NOTE — DISCHARGE INSTRUCTIONS
Home Care Instructions Following Your Pain Procedure     Judy Julien,  It has been a pleasure to have you as our patient. To help you at home, you must follow these general discharge instructions. We will review these with you before you are discharged. It is our hope that you have a complete and uneventful recovery from our procedure. General Instructions:  What to Expect:  Bandages from your procedure today can be removed when you get home. Please avoid soaking and/or swimming for 24 hours. Showering is okay  It is normal to have increased pain symptoms and/or pain at injection site for up to 3-5 days after procedure, you can use heat or ice (20 minutes on 20 minutes off) for comfort. You may experience some temporary side effects which may include restlessness or insomnia, flushing of the face, or heart palpitations. Please contact the provider if these symptoms do not resolve within 3-4 days. Lightheadedness or nausea may occur and should resolve within 24 to 48 hours. If you develop a headache after treatment, rest, drink fluids (with caffeine, if possible) and take mild over-the-counter pain medication. If the headache does not improve with the above treatment, contact the physician. Home Medications:  Resume all previously prescribed medication. Please avoid taking NSAIDs (Non-Steriodal Anti-Inflammatory Drugs) such as:  Ibuprofen ( Advil, Motrin) Aleve (Naproxen), Diclofenac, Meloxicam for 6 hours after procedure. If you are on Coumadin (Warfarin) or any other anti-coagulant (or \"blood thinning\") medication such as Plavix (Clopidogrel), Xarelto (Rivaroxaban), Eliquis (Apixaban), Effient (Prasugrel) etc., restart on the following day from the procedure unless otherwise directed by your provider. If you are a diabetic, please increase the frequency of your glucose monitoring after the procedure as steroids may cause a temporary (2-3 day) increase in your blood sugar.   Contact your primary care physician if your blood sugar remains elevated as you may require some medication adjustment. Diet:  Resume your regular diet as tolerated. Activity: We recommend that you relax and rest during the rest of your procedure day. If you feel weakness in your arms or legs do not drive. Follow-up Appointment  Please schedule a follow-up visit within 3 to 4 weeks after your last procedure date. Question or Concerns:  Feel free to call our office with any questions or concerns at [phone] (option #2)    Miley Fernández  Thank you for coming to [facility] for your procedure. The nurses try very hard to make sure you receive the best care possible. Your trust in them as well as us is greatly appreciated.     Thanks so much,   Dr. Doug Seymour

## 2022-11-14 ENCOUNTER — TELEPHONE (OUTPATIENT)
Dept: PAIN CLINIC | Facility: CLINIC | Age: 67
End: 2022-11-14

## 2022-11-14 RX ORDER — METHOCARBAMOL 500 MG/1
500 TABLET, FILM COATED ORAL 3 TIMES DAILY
Qty: 30 TABLET | Refills: 0 | Status: SHIPPED | OUTPATIENT
Start: 2022-11-14

## 2022-11-14 NOTE — TELEPHONE ENCOUNTER
Pt cousin Raffi Platt called to speak with nurse she states that pt is in an extreme amount of pain. She states that she doesn't want to take him to ER but would like to know if he should proceed with injection. Transferred the call to [facility].

## 2022-11-15 ENCOUNTER — APPOINTMENT (OUTPATIENT)
Dept: GENERAL RADIOLOGY | Facility: HOSPITAL | Age: 67
End: 2022-11-15
Attending: ANESTHESIOLOGY
Payer: MEDICARE

## 2022-11-15 ENCOUNTER — HOSPITAL ENCOUNTER (OUTPATIENT)
Facility: HOSPITAL | Age: 67
Setting detail: HOSPITAL OUTPATIENT SURGERY
Discharge: HOME | End: 2022-11-15
Attending: ANESTHESIOLOGY | Admitting: ANESTHESIOLOGY
Payer: MEDICARE

## 2022-11-15 ENCOUNTER — PATIENT OUTREACH (OUTPATIENT)
Dept: CASE MANAGEMENT | Age: 67
End: 2022-11-15

## 2022-11-15 VITALS
RESPIRATION RATE: 18 BRPM | HEART RATE: 74 BPM | WEIGHT: 232 LBS | TEMPERATURE: 98 F | BODY MASS INDEX: 36.41 KG/M2 | OXYGEN SATURATION: 98 % | SYSTOLIC BLOOD PRESSURE: 131 MMHG | HEIGHT: 67 IN | DIASTOLIC BLOOD PRESSURE: 66 MMHG

## 2022-11-15 PROCEDURE — 3E0T3BZ INTRODUCTION OF ANESTHETIC AGENT INTO PERIPHERAL NERVES AND PLEXI, PERCUTANEOUS APPROACH: ICD-10-PCS | Performed by: ANESTHESIOLOGY

## 2022-11-15 PROCEDURE — 64454 NJX AA&/STRD GNCLR NRV BRNCH: CPT | Performed by: ANESTHESIOLOGY

## 2022-11-15 PROCEDURE — 3E0T33Z INTRODUCTION OF ANTI-INFLAMMATORY INTO PERIPHERAL NERVES AND PLEXI, PERCUTANEOUS APPROACH: ICD-10-PCS | Performed by: ANESTHESIOLOGY

## 2022-11-15 RX ORDER — BUPIVACAINE HYDROCHLORIDE 5 MG/ML
INJECTION, SOLUTION EPIDURAL; INTRACAUDAL
Status: DISCONTINUED | OUTPATIENT
Start: 2022-11-15 | End: 2022-11-15

## 2022-11-15 RX ORDER — METHYLPREDNISOLONE ACETATE 40 MG/ML
INJECTION, SUSPENSION INTRA-ARTICULAR; INTRALESIONAL; INTRAMUSCULAR; SOFT TISSUE
Status: DISCONTINUED | OUTPATIENT
Start: 2022-11-15 | End: 2022-11-15

## 2022-11-15 RX ORDER — LIDOCAINE HYDROCHLORIDE 10 MG/ML
INJECTION, SOLUTION EPIDURAL; INFILTRATION; INTRACAUDAL; PERINEURAL
Status: DISCONTINUED | OUTPATIENT
Start: 2022-11-15 | End: 2022-11-15

## 2022-11-15 NOTE — OPERATIVE REPORT
[facility]  Operative Report  11/15/2022     Martin Jameson Patient Status:  Hospital Outpatient Surgery    1955 MRN [id number]   Location [address] [address] Attending Herbert Garcia MD   [facility] Day # 0 PCP Jaimee Fernandez MD     Indication: Alysa Churchill is a 78year old male with a history of prior total knee arthroplasty    Preoperative Diagnosis:  Chronic pain of left knee [M25.562, G89.29]    Postoperative Diagnosis: Same as above. Procedure performed: LEFT GENICULAR NERVE BLOCK with local     Anesthesia: Local     EBL: Less than 1 ml. Procedure Description:  After reviewing the patient's history and performing a focused physical examination, the diagnosis and positive previous diagnostic tests were confirmed and contraindications such as infection and coagulopathy were ruled out. Following review of allergies and potential side effects and complications, including but not necessarily limited to infection, allergic reaction, local tissue breakdown, nerve injury, and paresis, the patient consented for the procedure. The patient was brought to the procedure room and placed in the supine position. The appropriate knee was prepped in the usual sterile fashion. Subsequently, AP imaging was used identify the path of the superior medial, superior lateral, inferior medial genicular nerves. The overlying soft tissue was then anesthetized with 5 cc of 1% lidocaine at each site. A 22-gauge spinal needle was advanced with imaging guidance to contact bone along the path of the superior medial, superior lateral, inferior medial genicular nerves. After negative aspiration and reconfirmation of bony contact and needle placement, 40 mg of Depo-Medrol with 9 cc 1% lidocaine was evenly split amongst the 3 sites. The needle was then flushed and withdrawn tip intact. Patient tolerated procedure well. Complications: None. Follow up:  The patient was followed in the pain clinic as needed basis.           Soledad Newell MD

## 2022-11-15 NOTE — PROGRESS NOTES
Called patient regarding CCM monthly and left a message for patient to call back when they can. Reviewed patient chart. Left contact my contact number [phone].        Time Spent This Encounter Total: 3  min medical record review  Monthly Minute Total including today: 3 minutes

## 2022-11-16 ENCOUNTER — TELEPHONE (OUTPATIENT)
Dept: NEUROLOGY | Facility: CLINIC | Age: 67
End: 2022-11-16

## 2022-11-16 NOTE — TELEPHONE ENCOUNTER
Spoke with patient's cousin Storm Myles ([address] per [employer]) per Titus Oro he has not spoken with patient today, but will call office back with how patient is feeling after injection.      Reminded Storm Myles that patient has follow up appoitment with  [facility]

## 2022-11-18 DIAGNOSIS — N40.1 BENIGN PROSTATIC HYPERPLASIA WITH LOWER URINARY TRACT SYMPTOMS, SYMPTOM DETAILS UNSPECIFIED: ICD-10-CM

## 2022-11-18 RX ORDER — TAMSULOSIN HYDROCHLORIDE 0.4 MG/1
0.4 CAPSULE ORAL DAILY
Qty: 90 CAPSULE | Refills: 0 | Status: SHIPPED | OUTPATIENT
Start: 2022-11-18

## 2022-12-05 ENCOUNTER — TELEPHONE (OUTPATIENT)
Dept: PAIN CLINIC | Facility: CLINIC | Age: 67
End: 2022-12-05

## 2022-12-05 DIAGNOSIS — M51.36 DDD (DEGENERATIVE DISC DISEASE), LUMBAR: Primary | ICD-10-CM

## 2022-12-05 NOTE — TELEPHONE ENCOUNTER
Pt's cousin brock calling in stating pt is experiencing a lot of pain and isnt sure what to do. Pt is requesting to speak to a nurse.  Please advise

## 2022-12-06 ENCOUNTER — TELEPHONE (OUTPATIENT)
Dept: ORTHOPEDICS CLINIC | Facility: CLINIC | Age: 67
End: 2022-12-06

## 2022-12-06 DIAGNOSIS — M54.50 LOW BACK PAIN, UNSPECIFIED BACK PAIN LATERALITY, UNSPECIFIED CHRONICITY, UNSPECIFIED WHETHER SCIATICA PRESENT: Primary | ICD-10-CM

## 2022-12-06 RX ORDER — TRAMADOL HYDROCHLORIDE 50 MG/1
50 TABLET ORAL NIGHTLY
Qty: 30 TABLET | Refills: 0 | Status: SHIPPED | OUTPATIENT
Start: 2022-12-06

## 2022-12-06 NOTE — TELEPHONE ENCOUNTER
NPT scheduled with Dr. Omero Turner for lower lumbar degenerative disc disease. Patient was referred by Dr. Adryan Padron. Patient has an MRI in [facility] from 9/15/22. Please advise if further imaging is needed. Thank you.     Future Appointments   Date Time Provider Jagdeep Herron   12/8/2022  2:00 PM Sara Valiente MD [facility] [id number]

## 2022-12-06 NOTE — TELEPHONE ENCOUNTER
Contacted Jacki Fang to advise that tramadol has been sent to [facility] in [place]. Advised Jacki Fang that this medication can cause drowsiness and can cause some instability if this SE is experienced. Encouraged Jacki Fang to monitor pt after the first couple of doses to see how he responds and that it should only be given at bedtime. Arcadio Sen.

## 2022-12-06 NOTE — TELEPHONE ENCOUNTER
spoke with pt relayed msg. She states he is in a lot of pain. What can she do? She said last time if he was not feeling better, she was told he will be referred to a surgeon.  This is for low back pain

## 2022-12-06 NOTE — TELEPHONE ENCOUNTER
Contacted Shikha to [employer] below. Ph # to Dr Mary Capellan provided. Lizbeth Pinto is asking for another pain medication that pt can take at night to help with pain. Lizbeth Pinto states she would not allow pt to take during the day while he is alone, but if pain could be managed better at night, he could sleep better. Pt is currently taking methocarbamol and duloxetine. Lizbeth Pinto reports that pt did not tolerate an increase in duloxetine and only takes once daily. Advised that Dr Khanh Quevedo is aware that pt is sensitive to medications and will ask if there are any meds that would be appropriate to add to his regimen. Raghavendra Aranda.

## 2022-12-08 ENCOUNTER — OFFICE VISIT (OUTPATIENT)
Dept: ORTHOPEDICS CLINIC | Facility: CLINIC | Age: 67
End: 2022-12-08
Payer: MEDICARE

## 2022-12-08 ENCOUNTER — HOSPITAL ENCOUNTER (OUTPATIENT)
Dept: GENERAL RADIOLOGY | Age: 67
Discharge: HOME | End: 2022-12-08
Attending: STUDENT IN AN ORGANIZED HEALTH CARE EDUCATION/TRAINING PROGRAM
Payer: MEDICARE

## 2022-12-08 VITALS — HEIGHT: 66 IN | BODY MASS INDEX: 37.28 KG/M2 | WEIGHT: 232 LBS

## 2022-12-08 DIAGNOSIS — M54.6 THORACIC SPINE PAIN: Primary | ICD-10-CM

## 2022-12-08 DIAGNOSIS — M54.50 LOW BACK PAIN, UNSPECIFIED BACK PAIN LATERALITY, UNSPECIFIED CHRONICITY, UNSPECIFIED WHETHER SCIATICA PRESENT: ICD-10-CM

## 2022-12-08 PROCEDURE — 99214 OFFICE O/P EST MOD 30 MIN: CPT | Performed by: STUDENT IN AN ORGANIZED HEALTH CARE EDUCATION/TRAINING PROGRAM

## 2022-12-08 PROCEDURE — 1125F AMNT PAIN NOTED PAIN PRSNT: CPT | Performed by: STUDENT IN AN ORGANIZED HEALTH CARE EDUCATION/TRAINING PROGRAM

## 2022-12-08 PROCEDURE — 3008F BODY MASS INDEX DOCD: CPT | Performed by: STUDENT IN AN ORGANIZED HEALTH CARE EDUCATION/TRAINING PROGRAM

## 2022-12-08 PROCEDURE — 72100 X-RAY EXAM L-S SPINE 2/3 VWS: CPT | Performed by: STUDENT IN AN ORGANIZED HEALTH CARE EDUCATION/TRAINING PROGRAM

## 2022-12-21 ENCOUNTER — HOSPITAL ENCOUNTER (OUTPATIENT)
Dept: MRI IMAGING | Age: 67
Discharge: HOME | End: 2022-12-21
Attending: STUDENT IN AN ORGANIZED HEALTH CARE EDUCATION/TRAINING PROGRAM
Payer: MEDICARE

## 2022-12-21 DIAGNOSIS — M54.6 THORACIC SPINE PAIN: ICD-10-CM

## 2022-12-21 PROCEDURE — 72146 MRI CHEST SPINE W/O DYE: CPT | Performed by: STUDENT IN AN ORGANIZED HEALTH CARE EDUCATION/TRAINING PROGRAM

## 2022-12-23 ENCOUNTER — TELEPHONE (OUTPATIENT)
Dept: ORTHOPEDICS CLINIC | Facility: CLINIC | Age: 67
End: 2022-12-23

## 2022-12-23 NOTE — TELEPHONE ENCOUNTER
Called patient to discuss MRI results. No evidence of neural compression. Continue symptomatic treatment for back pain. F/u as needed.

## 2022-12-28 ENCOUNTER — PATIENT MESSAGE (OUTPATIENT)
Dept: ORTHOPEDICS CLINIC | Facility: CLINIC | Age: 67
End: 2022-12-28

## 2022-12-28 ENCOUNTER — TELEPHONE (OUTPATIENT)
Dept: INTERNAL MEDICINE CLINIC | Facility: CLINIC | Age: 67
End: 2022-12-28

## 2022-12-28 ENCOUNTER — PATIENT MESSAGE (OUTPATIENT)
Dept: INTERNAL MEDICINE CLINIC | Facility: CLINIC | Age: 67
End: 2022-12-28

## 2022-12-28 DIAGNOSIS — N40.0 BENIGN PROSTATIC HYPERPLASIA, UNSPECIFIED WHETHER LOWER URINARY TRACT SYMPTOMS PRESENT: Primary | ICD-10-CM

## 2022-12-28 DIAGNOSIS — E78.00 ELEVATED CHOLESTEROL: ICD-10-CM

## 2022-12-28 DIAGNOSIS — M54.2 NECK PAIN: ICD-10-CM

## 2022-12-28 RX ORDER — ATORVASTATIN CALCIUM 40 MG/1
40 TABLET, FILM COATED ORAL NIGHTLY
Qty: 90 TABLET | Refills: 0 | Status: CANCELLED | OUTPATIENT
Start: 2022-12-28

## 2022-12-28 RX ORDER — DULOXETIN HYDROCHLORIDE 60 MG/1
60 CAPSULE, DELAYED RELEASE ORAL DAILY
Qty: 30 CAPSULE | Refills: 0 | Status: SHIPPED | OUTPATIENT
Start: 2022-12-28

## 2022-12-28 NOTE — TELEPHONE ENCOUNTER
From: Mary Lambert  To: Leslie Osborn MD  Sent: 12/28/2022 10:19 AM CST  Subject: MRI     What is the next step now that the MRI is done? What do you recommend? Savi Galeano is in a lot of pain still.      Thank you

## 2022-12-28 NOTE — TELEPHONE ENCOUNTER
LOV 12/8/22, will call pt after MRI completed  MRI 12/21/22    Requesting call back regarding MRI results.

## 2022-12-28 NOTE — TELEPHONE ENCOUNTER
From: Reinaldo Ye  To: TOBIN Joyce  Sent: 12/28/2022 10:20 AM CST  Subject: Referral needed    Joeyton Callander needs to see his urologist. Please send referral. Thanks.

## 2022-12-28 NOTE — TELEPHONE ENCOUNTER
Talked to [place]. They will test pt for covid. Could not answer nurse when the symptoms started and what symptoms pt has.  They will call us back

## 2022-12-28 NOTE — TELEPHONE ENCOUNTER
Cholesterol Medication Protocol Passed 12/28/2022 09:49 AM   Protocol Details  ALT < 80    ALT resulted within past year    Lipid panel within past 12 months    Appointment within past 12 or next 3        Talked to Zacarias Polanco and pt taking 40mg 2 tab per cardiologist. Medication denied advised to call cardio dr and get refill from him.

## 2022-12-28 NOTE — TELEPHONE ENCOUNTER
Pts cousin Zacarias Polanco called and stated that Pt has a deep cough, congestion, not sleeping well. Made a VV on 12/30 with Bishop Gipson. Zacarias Polanco was wondering what OTC meds he can take with the current meds he is on until he has his appt.    Please advise  Thank you

## 2022-12-29 RX ORDER — ATORVASTATIN CALCIUM 40 MG/1
80 TABLET, FILM COATED ORAL DAILY
Qty: 30 TABLET | Refills: 11 | OUTPATIENT
Start: 2022-12-29

## 2022-12-29 NOTE — TELEPHONE ENCOUNTER
Talked to Allison Pizarro and pt's c/o back pain and cough. Home covid test was negative yesterday. Pt has back issues. Advised to call pain management dr to help with back pain.  They will keep VV visit for tomorrow for cough

## 2022-12-29 NOTE — TELEPHONE ENCOUNTER
I had discussed the MRI results with patient's caregiver Dawson Jo. No spinal abnormalities to explain patient's back pain. Recommend continued conservative management.

## 2022-12-29 NOTE — TELEPHONE ENCOUNTER
Future Appointments   Date Time Provider Jagdeep Herron   12/30/2022  8:20 AM TOBIN Buckley [facility]   3/20/2023 11:45 AM Vianca Villafana APRN [facility]

## 2022-12-30 ENCOUNTER — TELEMEDICINE (OUTPATIENT)
Dept: INTERNAL MEDICINE CLINIC | Facility: CLINIC | Age: 67
End: 2022-12-30
Payer: MEDICARE

## 2022-12-30 DIAGNOSIS — J06.9 VIRAL URI WITH COUGH: Primary | ICD-10-CM

## 2022-12-30 PROCEDURE — 99213 OFFICE O/P EST LOW 20 MIN: CPT | Performed by: NURSE PRACTITIONER

## 2022-12-30 RX ORDER — AMLODIPINE BESYLATE 5 MG/1
5 TABLET ORAL DAILY
COMMUNITY

## 2022-12-30 RX ORDER — BENZONATATE 100 MG/1
100 CAPSULE ORAL 3 TIMES DAILY PRN
Qty: 30 CAPSULE | Refills: 0 | Status: SHIPPED | OUTPATIENT
Start: 2022-12-30

## 2022-12-30 NOTE — PATIENT INSTRUCTIONS
Get your respiratory panel test done. Start benzonatate Perles 3 times daily as needed for the cough. Do warm, salt water gargles 2-3 times daily. You can use Robitussin DM or Mucinex DM as directed on the bottle for cough and chest congestion. Use Cepacol for sore throat and dry cough as needed. Use Tylenol as needed for pain or fever. Stay hydrated. Go to ER or call 911 if worsening symptoms such as persistent fever >103, difficulty breathing, or chest pain. Return to clinic in one week as needed or when routine care is due. Infection prevention:  - Proper hand hygiene is very important          - Wear a mask in public  - Avoid touching your mouth, nose, eyes, and face  - Practice social distancing and staying 6 ft away from other individuals  - Cough into your arm or a tissue  - Avoid contact with others if you have symptoms of an upper or lower respiratory infection  - Avoid contact with others who are ill  - Avoid direct contact with your pets if you are ill  - Disinfect surfaces with appropriate materials  - If your symptoms become severe (shortness of breath with rest or activity, fever, chest congestion, productive cough), go to the ER  - Recommend self-isolation at home if you are sick, wearing a mask if in room with other family members    CDC Update: Isolation & Quarantine Guidelines for Patients - Updated 12/29/2021  Patients who are healthcare workers, follow your organization policy. If You Test Positive for COVID-19 (Isolate)    Everyone regardless of vaccination status:   Stay home for 5 days  If you have no symptoms or your symptoms are resolving after 5 days, you can leave your house, return to work, travel, etc.  Continue to wear a mask around others for 5 additional days. If you have a fever, continue to stay home until your fever resolves.       If You Were Exposed to Someone with COVID-19 Robi Tang)    If you:               Have been boosted  OR  Completed the primary series of Pfizer or Moderna vaccine within the last 6 months  OR  Completed the primary series of J&J vaccine within the last 2 months    Wear a mask around others for 10 days. Test on day 5, if possible. If you develop symptoms get a test and stay home. If you:  Completed the primary series of Pfizer or Moderna vaccine over 6 months ago and are not boosted  OR  Completed the primary series of J&J over 2 months ago and are not boosted  OR  Are unvaccinated    Stay home for 5 days. After that continue to wear a mask around others for 5 additional days. If you can't quarantine you must wear a mask for 10 days. Test on day 5 if possible. If you develop symptoms get a test and stay home.     cdc.gov/coronavirus    10 THINGS YOU CAN DO TO MANAGE YOUR COVID-19 SYMPTOMS AT HOME    If you have possible or confirmed COVID-19    Stay home except to get medical care. Monitor your symptoms carefully. If your symptoms get worse, call your healthcare provider immediately. Get rest and stay hydrated. If you have a medical appointment, call the healthcare provider ahead of time and tell them you have or may have COVID-19. For medical emergencies, call 911 and notify the dispatch personnel that you have or may have COVID-19. Cover your cough and sneezes with a tissue or use the inside of your elbow. Wash your hands often with soap and water for at least 20 seconds or clean hands with an alcohol-based hand  that contains at least 60% alcohol. As much as possible, stay in a specific room and away from other people in your home. Also, you should use a separate bathroom, if available. If you need to be around other people in or outside of the home, wear a mask. Avoid sharing personal items with other people in your household, like dishes, towels, and bedding. Clean all surfaces that are touched often, like counters, tabletops, and doorknobs.  Use household cleaning sprays or wipes according to the label instructions.

## 2023-01-03 RX ORDER — ATORVASTATIN CALCIUM 40 MG/1
80 TABLET, FILM COATED ORAL DAILY
Qty: 30 TABLET | Refills: 11 | OUTPATIENT
Start: 2023-01-03

## 2023-01-16 ENCOUNTER — PATIENT OUTREACH (OUTPATIENT)
Dept: CASE MANAGEMENT | Age: 68
End: 2023-01-16

## 2023-01-16 DIAGNOSIS — I10 PRIMARY HYPERTENSION: ICD-10-CM

## 2023-01-16 DIAGNOSIS — R42 DIZZINESS AND GIDDINESS: ICD-10-CM

## 2023-01-16 DIAGNOSIS — N18.32 STAGE 3B CHRONIC KIDNEY DISEASE (HCC): ICD-10-CM

## 2023-01-16 DIAGNOSIS — G47.33 OSA (OBSTRUCTIVE SLEEP APNEA): ICD-10-CM

## 2023-01-16 DIAGNOSIS — G25.81 RLS (RESTLESS LEGS SYNDROME): ICD-10-CM

## 2023-01-16 DIAGNOSIS — E78.5 DYSLIPIDEMIA: ICD-10-CM

## 2023-01-16 DIAGNOSIS — I65.29 STENOSIS OF CAROTID ARTERY, UNSPECIFIED LATERALITY: ICD-10-CM

## 2023-01-16 DIAGNOSIS — R41.3 MEMORY DEFICIT: ICD-10-CM

## 2023-01-16 DIAGNOSIS — G44.86 CERVICOGENIC HEADACHE: ICD-10-CM

## 2023-01-16 DIAGNOSIS — I48.0 PAROXYSMAL ATRIAL FIBRILLATION (HCC): ICD-10-CM

## 2023-01-16 DIAGNOSIS — M47.816 LUMBAR SPONDYLOSIS: ICD-10-CM

## 2023-01-16 DIAGNOSIS — Z86.73 HISTORY OF STROKE: ICD-10-CM

## 2023-01-16 DIAGNOSIS — F33.1 MAJOR DEPRESSIVE DISORDER, RECURRENT, MODERATE (HCC): ICD-10-CM

## 2023-01-16 DIAGNOSIS — R73.02 IGT (IMPAIRED GLUCOSE TOLERANCE): ICD-10-CM

## 2023-01-16 RX ORDER — ATORVASTATIN CALCIUM 80 MG/1
80 TABLET, FILM COATED ORAL NIGHTLY
COMMUNITY

## 2023-01-17 RX ORDER — TRAMADOL HYDROCHLORIDE 50 MG/1
50 TABLET ORAL NIGHTLY
Qty: 30 TABLET | Refills: 0 | Status: SHIPPED | OUTPATIENT
Start: 2023-01-17

## 2023-01-17 NOTE — TELEPHONE ENCOUNTER
Please let patient know that if he would like to continue having Dr. Annel Thompson prescribe the tramadol we would require the opioid agreement and UDS, otherwise he can d/w his PCP for ongoing medication refills as he is using low dose of the opioid

## 2023-02-07 RX ORDER — AMLODIPINE BESYLATE 5 MG/1
TABLET ORAL
Qty: 30 TABLET | Refills: 0 | Status: SHIPPED | OUTPATIENT
Start: 2023-02-07

## 2023-02-21 ENCOUNTER — LAB ENCOUNTER (OUTPATIENT)
Dept: LAB | Age: 68
End: 2023-02-21
Attending: NURSE PRACTITIONER
Payer: MEDICARE

## 2023-02-21 ENCOUNTER — PATIENT OUTREACH (OUTPATIENT)
Dept: CASE MANAGEMENT | Age: 68
End: 2023-02-21

## 2023-02-21 DIAGNOSIS — G25.81 RLS (RESTLESS LEGS SYNDROME): ICD-10-CM

## 2023-02-21 DIAGNOSIS — E78.5 DYSLIPIDEMIA: ICD-10-CM

## 2023-02-21 DIAGNOSIS — M47.816 LUMBAR SPONDYLOSIS: ICD-10-CM

## 2023-02-21 DIAGNOSIS — Z00.00 ENCOUNTER FOR ANNUAL HEALTH EXAMINATION: ICD-10-CM

## 2023-02-21 DIAGNOSIS — N18.32 STAGE 3B CHRONIC KIDNEY DISEASE (HCC): ICD-10-CM

## 2023-02-21 DIAGNOSIS — G47.33 OSA (OBSTRUCTIVE SLEEP APNEA): ICD-10-CM

## 2023-02-21 DIAGNOSIS — R41.3 MEMORY DEFICIT: ICD-10-CM

## 2023-02-21 DIAGNOSIS — G44.86 CERVICOGENIC HEADACHE: ICD-10-CM

## 2023-02-21 DIAGNOSIS — Z86.73 HISTORY OF STROKE: ICD-10-CM

## 2023-02-21 DIAGNOSIS — R73.02 IGT (IMPAIRED GLUCOSE TOLERANCE): ICD-10-CM

## 2023-02-21 DIAGNOSIS — I10 PRIMARY HYPERTENSION: ICD-10-CM

## 2023-02-21 DIAGNOSIS — I48.0 PAROXYSMAL ATRIAL FIBRILLATION (HCC): ICD-10-CM

## 2023-02-21 DIAGNOSIS — F33.1 MAJOR DEPRESSIVE DISORDER, RECURRENT, MODERATE (HCC): ICD-10-CM

## 2023-02-21 LAB
ALBUMIN SERPL-MCNC: 4 G/DL (ref 3.4–5)
ALBUMIN/GLOB SERPL: 1.1 {RATIO} (ref 1–2)
ALP LIVER SERPL-CCNC: 91 U/L
ALT SERPL-CCNC: 29 U/L
ANION GAP SERPL CALC-SCNC: 6 MMOL/L (ref 0–18)
AST SERPL-CCNC: 29 U/L (ref 15–37)
BASOPHILS # BLD AUTO: 0.06 X10(3) UL (ref 0–0.2)
BASOPHILS NFR BLD AUTO: 0.8 %
BILIRUB SERPL-MCNC: 1.3 MG/DL (ref 0.1–2)
BUN BLD-MCNC: 12 MG/DL (ref 7–18)
CALCIUM BLD-MCNC: 9.4 MG/DL (ref 8.5–10.1)
CHLORIDE SERPL-SCNC: 105 MMOL/L (ref 98–112)
CHOLEST SERPL-MCNC: 123 MG/DL (ref ?–200)
CO2 SERPL-SCNC: 24 MMOL/L (ref 21–32)
CREAT BLD-MCNC: 1.41 MG/DL
EOSINOPHIL # BLD AUTO: 0.15 X10(3) UL (ref 0–0.7)
EOSINOPHIL NFR BLD AUTO: 1.9 %
ERYTHROCYTE [DISTWIDTH] IN BLOOD BY AUTOMATED COUNT: 15.9 %
FASTING PATIENT LIPID ANSWER: YES
FASTING STATUS PATIENT QL REPORTED: YES
GFR SERPLBLD BASED ON 1.73 SQ M-ARVRAT: 55 ML/MIN/1.73M2 (ref 60–?)
GLOBULIN PLAS-MCNC: 3.6 G/DL (ref 2.8–4.4)
GLUCOSE BLD-MCNC: 128 MG/DL (ref 70–99)
HCT VFR BLD AUTO: 48.3 %
HDLC SERPL-MCNC: 35 MG/DL (ref 40–59)
HGB BLD-MCNC: 16.6 G/DL
IMM GRANULOCYTES # BLD AUTO: 0.03 X10(3) UL (ref 0–1)
IMM GRANULOCYTES NFR BLD: 0.4 %
LDLC SERPL CALC-MCNC: 58 MG/DL (ref ?–100)
LYMPHOCYTES # BLD AUTO: 2.09 X10(3) UL (ref 1–4)
LYMPHOCYTES NFR BLD AUTO: 26.8 %
MCH RBC QN AUTO: 33.3 PG (ref 26–34)
MCHC RBC AUTO-ENTMCNC: 34.4 G/DL (ref 31–37)
MCV RBC AUTO: 96.8 FL
MONOCYTES # BLD AUTO: 0.58 X10(3) UL (ref 0.1–1)
MONOCYTES NFR BLD AUTO: 7.4 %
NEUTROPHILS # BLD AUTO: 4.88 X10 (3) UL (ref 1.5–7.7)
NEUTROPHILS # BLD AUTO: 4.88 X10(3) UL (ref 1.5–7.7)
NEUTROPHILS NFR BLD AUTO: 62.7 %
NONHDLC SERPL-MCNC: 88 MG/DL (ref ?–130)
OSMOLALITY SERPL CALC.SUM OF ELEC: 281 MOSM/KG (ref 275–295)
PLATELET # BLD AUTO: 303 10(3)UL (ref 150–450)
POTASSIUM SERPL-SCNC: 4.7 MMOL/L (ref 3.5–5.1)
PROT SERPL-MCNC: 7.6 G/DL (ref 6.4–8.2)
RBC # BLD AUTO: 4.99 X10(6)UL
SODIUM SERPL-SCNC: 135 MMOL/L (ref 136–145)
TRIGL SERPL-MCNC: 178 MG/DL (ref 30–149)
TSI SER-ACNC: 2.93 MIU/ML (ref 0.36–3.74)
VLDLC SERPL CALC-MCNC: 26 MG/DL (ref 0–30)
WBC # BLD AUTO: 7.8 X10(3) UL (ref 4–11)

## 2023-02-21 PROCEDURE — 36415 COLL VENOUS BLD VENIPUNCTURE: CPT

## 2023-02-21 PROCEDURE — 84443 ASSAY THYROID STIM HORMONE: CPT

## 2023-02-21 PROCEDURE — 80053 COMPREHEN METABOLIC PANEL: CPT

## 2023-02-21 PROCEDURE — 85025 COMPLETE CBC W/AUTO DIFF WBC: CPT

## 2023-02-21 PROCEDURE — 80061 LIPID PANEL: CPT

## 2023-02-22 ENCOUNTER — OFFICE VISIT (OUTPATIENT)
Dept: INTERNAL MEDICINE CLINIC | Facility: CLINIC | Age: 68
End: 2023-02-22
Payer: MEDICARE

## 2023-02-22 VITALS
TEMPERATURE: 98 F | BODY MASS INDEX: 35.49 KG/M2 | HEIGHT: 66 IN | RESPIRATION RATE: 14 BRPM | WEIGHT: 220.81 LBS | OXYGEN SATURATION: 98 % | SYSTOLIC BLOOD PRESSURE: 110 MMHG | HEART RATE: 78 BPM | DIASTOLIC BLOOD PRESSURE: 66 MMHG

## 2023-02-22 DIAGNOSIS — L59.0 ERYTHEMA AB IGNE: ICD-10-CM

## 2023-02-22 DIAGNOSIS — Z23 NEED FOR VACCINATION: ICD-10-CM

## 2023-02-22 DIAGNOSIS — M54.50 CHRONIC BILATERAL LOW BACK PAIN WITHOUT SCIATICA: ICD-10-CM

## 2023-02-22 DIAGNOSIS — R39.9 UTI SYMPTOMS: Primary | ICD-10-CM

## 2023-02-22 DIAGNOSIS — G89.29 CHRONIC BILATERAL LOW BACK PAIN WITHOUT SCIATICA: ICD-10-CM

## 2023-02-22 LAB
BILIRUB UR QL STRIP.AUTO: NEGATIVE
COLOR UR AUTO: YELLOW
GLUCOSE UR STRIP.AUTO-MCNC: NEGATIVE MG/DL
HYALINE CASTS #/AREA URNS AUTO: PRESENT /LPF
KETONES UR STRIP.AUTO-MCNC: NEGATIVE MG/DL
LEUKOCYTE ESTERASE UR QL STRIP.AUTO: NEGATIVE
NITRITE UR QL STRIP.AUTO: POSITIVE
PH UR STRIP.AUTO: 5 [PH] (ref 5–8)
PROT UR STRIP.AUTO-MCNC: NEGATIVE MG/DL
RBC UR QL AUTO: NEGATIVE
SP GR UR STRIP.AUTO: 1.02 (ref 1–1.03)
UROBILINOGEN UR STRIP.AUTO-MCNC: 2 MG/DL

## 2023-02-22 PROCEDURE — 3078F DIAST BP <80 MM HG: CPT | Performed by: NURSE PRACTITIONER

## 2023-02-22 PROCEDURE — 3008F BODY MASS INDEX DOCD: CPT | Performed by: NURSE PRACTITIONER

## 2023-02-22 PROCEDURE — 87086 URINE CULTURE/COLONY COUNT: CPT | Performed by: NURSE PRACTITIONER

## 2023-02-22 PROCEDURE — 3074F SYST BP LT 130 MM HG: CPT | Performed by: NURSE PRACTITIONER

## 2023-02-22 PROCEDURE — 81001 URINALYSIS AUTO W/SCOPE: CPT | Performed by: NURSE PRACTITIONER

## 2023-02-22 PROCEDURE — 99214 OFFICE O/P EST MOD 30 MIN: CPT | Performed by: NURSE PRACTITIONER

## 2023-02-22 RX ORDER — DULOXETIN HYDROCHLORIDE 60 MG/1
60 CAPSULE, DELAYED RELEASE ORAL DAILY
Qty: 90 CAPSULE | Refills: 0 | Status: SHIPPED | OUTPATIENT
Start: 2023-02-22

## 2023-02-22 RX ORDER — TAMSULOSIN HYDROCHLORIDE 0.4 MG/1
0.4 CAPSULE ORAL DAILY
COMMUNITY
Start: 2022-11-18

## 2023-02-22 RX ORDER — TRAMADOL HYDROCHLORIDE 50 MG/1
50 TABLET ORAL NIGHTLY PRN
Qty: 90 TABLET | Refills: 0 | Status: SHIPPED | OUTPATIENT
Start: 2023-02-22

## 2023-02-22 RX ORDER — ATORVASTATIN CALCIUM 40 MG/1
1 TABLET, FILM COATED ORAL DAILY
COMMUNITY
Start: 2023-02-19

## 2023-02-22 NOTE — PATIENT INSTRUCTIONS
See urology    Continue good hydration. Do not use the heating pad for more than 10 to 15 minutes twice daily. Use topical medications for your pain. Use the tramadol as needed for severe pain only. Only use once daily due to the potential for increase in side effects with duloxetine and tramadol. Do not drink alcohol. The patient's medication should be given to him by someone else due to his dementia and potential for accidental overdose.     Follow up in 3 months for your annual visit or sooner as needed

## 2023-02-23 RX ORDER — CEPHALEXIN 500 MG/1
500 TABLET ORAL 2 TIMES DAILY
Qty: 14 TABLET | Refills: 0 | Status: SHIPPED | OUTPATIENT
Start: 2023-02-23 | End: 2023-03-02

## 2023-03-08 ENCOUNTER — LAB ENCOUNTER (OUTPATIENT)
Dept: LAB | Facility: HOSPITAL | Age: 68
End: 2023-03-08
Attending: NURSE PRACTITIONER
Payer: MEDICARE

## 2023-03-08 ENCOUNTER — OFFICE VISIT (OUTPATIENT)
Dept: SURGERY | Facility: CLINIC | Age: 68
End: 2023-03-08

## 2023-03-08 DIAGNOSIS — N40.1 BENIGN PROSTATIC HYPERPLASIA WITH LOWER URINARY TRACT SYMPTOMS, SYMPTOM DETAILS UNSPECIFIED: ICD-10-CM

## 2023-03-08 DIAGNOSIS — R97.20 ELEVATED PSA: ICD-10-CM

## 2023-03-08 DIAGNOSIS — R31.0 GROSS HEMATURIA: Primary | ICD-10-CM

## 2023-03-08 LAB
BILIRUB UR QL: NEGATIVE
CLARITY UR: CLEAR
COLOR UR: YELLOW
GLUCOSE UR-MCNC: NORMAL MG/DL
HGB UR QL STRIP.AUTO: NEGATIVE
LEUKOCYTE ESTERASE UR QL STRIP.AUTO: NEGATIVE
NITRITE UR QL STRIP.AUTO: NEGATIVE
PH UR: 5.5 [PH] (ref 5–8)
PROT UR-MCNC: 30 MG/DL
PSA SERPL-MCNC: 5.81 NG/ML (ref ?–4)
SP GR UR STRIP: >1.03 (ref 1–1.03)
UROBILINOGEN UR STRIP-ACNC: 2

## 2023-03-08 PROCEDURE — 87086 URINE CULTURE/COLONY COUNT: CPT | Performed by: NURSE PRACTITIONER

## 2023-03-08 PROCEDURE — 99214 OFFICE O/P EST MOD 30 MIN: CPT | Performed by: NURSE PRACTITIONER

## 2023-03-08 PROCEDURE — 36415 COLL VENOUS BLD VENIPUNCTURE: CPT

## 2023-03-08 PROCEDURE — 81001 URINALYSIS AUTO W/SCOPE: CPT | Performed by: NURSE PRACTITIONER

## 2023-03-08 PROCEDURE — 84153 ASSAY OF PSA TOTAL: CPT

## 2023-03-08 PROCEDURE — 88108 CYTOPATH CONCENTRATE TECH: CPT | Performed by: NURSE PRACTITIONER

## 2023-03-08 RX ORDER — TAMSULOSIN HYDROCHLORIDE 0.4 MG/1
0.4 CAPSULE ORAL DAILY
Qty: 90 CAPSULE | Refills: 3 | Status: SHIPPED | OUTPATIENT
Start: 2023-03-08

## 2023-03-09 LAB — NON GYNE INTERPRETATION: NEGATIVE

## 2023-03-09 RX ORDER — ROPINIROLE 1 MG/1
1 TABLET, FILM COATED ORAL 2 TIMES DAILY
Qty: 180 TABLET | Refills: 0 | Status: SHIPPED | OUTPATIENT
Start: 2023-03-09

## 2023-03-09 RX ORDER — AMLODIPINE BESYLATE 5 MG/1
TABLET ORAL
Qty: 90 TABLET | Refills: 0 | Status: SHIPPED | OUTPATIENT
Start: 2023-03-09

## 2023-03-09 RX ORDER — METOPROLOL SUCCINATE 25 MG/1
25 TABLET, EXTENDED RELEASE ORAL DAILY
Qty: 90 TABLET | Refills: 0 | Status: SHIPPED | OUTPATIENT
Start: 2023-03-09

## 2023-03-09 NOTE — TELEPHONE ENCOUNTER
Last OV relevant to medication: 7/28/22  Last refill date: 8/25/22 #180/refills: 1  When pt was asked to return for OV: 5/22/23 for AWV  Upcoming appt/reason: No future appointments at [facility]  Was pt informed of any over due labs: none overdue   Hypertension Medications Protocol Passed 03/09/2023 02:15 PM   Protocol Details  CMP or BMP in past 12 months    Last serum creatinine< 2.0    Appointment in past 6 or next 3 months      Amlodipine and Metoprolol refilled per protocol.

## 2023-03-10 ENCOUNTER — PATIENT OUTREACH (OUTPATIENT)
Dept: CASE MANAGEMENT | Age: 68
End: 2023-03-10

## 2023-03-10 NOTE — PROGRESS NOTES
Called patient regarding CCM monthly and left a message for patient to call back when they can. Reviewed patient chart. Time Spent This Encounter Total: 3  min medical record review  Monthly Minute Total including today: 3 minutes    Ashanti Marquez, [address] [place] [address]  Phone: [phone]. Time [place] [address]

## 2023-03-20 ENCOUNTER — HOSPITAL ENCOUNTER (OUTPATIENT)
Dept: CT IMAGING | Facility: HOSPITAL | Age: 68
Discharge: HOME | End: 2023-03-20
Attending: NURSE PRACTITIONER
Payer: MEDICARE

## 2023-03-20 DIAGNOSIS — N40.1 BENIGN PROSTATIC HYPERPLASIA WITH LOWER URINARY TRACT SYMPTOMS, SYMPTOM DETAILS UNSPECIFIED: ICD-10-CM

## 2023-03-20 DIAGNOSIS — R31.0 GROSS HEMATURIA: ICD-10-CM

## 2023-03-20 PROCEDURE — 74178 CT ABD&PLV WO CNTR FLWD CNTR: CPT | Performed by: NURSE PRACTITIONER

## 2023-03-20 PROCEDURE — 76377 3D RENDER W/INTRP POSTPROCES: CPT | Performed by: NURSE PRACTITIONER

## 2023-03-21 ENCOUNTER — TELEPHONE (OUTPATIENT)
Dept: SURGERY | Facility: CLINIC | Age: 68
End: 2023-03-21

## 2023-03-21 NOTE — TELEPHONE ENCOUNTER
I called pt and s/w his cousin Sheron Cantu who has a signed KUNAL on file and I informed him of the results and instructions in [facility] as stated below and he verbalized understanding and compliance and will try to get it schd before the office cysto on 4/14.

## 2023-03-21 NOTE — TELEPHONE ENCOUNTER
----- Message from TOBIN Munoz sent at 3/21/2023  1:24 PM CDT -----  Please let patient and/or family know that patient PSA is elevated at 5.81 ng/ml. This is slightly increased compared to November 2021 when PSA was 5.17 ng/ml. This is a test for prostate cancer screening and separate from the blood in his urine which we are also working up. Given this elevation I would recommend a MRI prostate for evaluation. This is to evaluate for any possible lesions within the prostate. I also reviewed his CT urogram which he completed. There are no urinary stones, renal obstruction, or tumors within the kidneys or ureters. His prostate appears enlarged with some bladder wall thickening which may be related to his enlarged prostate. We will better evaluate the bladder during his cystoscopy.

## 2023-04-14 ENCOUNTER — PATIENT OUTREACH (OUTPATIENT)
Dept: CASE MANAGEMENT | Age: 68
End: 2023-04-14

## 2023-04-14 ENCOUNTER — PROCEDURE (OUTPATIENT)
Dept: SURGERY | Facility: CLINIC | Age: 68
End: 2023-04-14

## 2023-04-14 VITALS — DIASTOLIC BLOOD PRESSURE: 87 MMHG | HEART RATE: 73 BPM | SYSTOLIC BLOOD PRESSURE: 136 MMHG

## 2023-04-14 DIAGNOSIS — N40.1 BENIGN PROSTATIC HYPERPLASIA WITH LOWER URINARY TRACT SYMPTOMS, SYMPTOM DETAILS UNSPECIFIED: ICD-10-CM

## 2023-04-14 DIAGNOSIS — R31.0 GROSS HEMATURIA: Primary | ICD-10-CM

## 2023-04-14 DIAGNOSIS — R97.20 ELEVATED PSA: ICD-10-CM

## 2023-04-14 PROCEDURE — 3075F SYST BP GE 130 - 139MM HG: CPT | Performed by: UROLOGY

## 2023-04-14 PROCEDURE — 52000 CYSTOURETHROSCOPY: CPT | Performed by: UROLOGY

## 2023-04-14 PROCEDURE — 99213 OFFICE O/P EST LOW 20 MIN: CPT | Performed by: UROLOGY

## 2023-04-14 PROCEDURE — 3079F DIAST BP 80-89 MM HG: CPT | Performed by: UROLOGY

## 2023-04-14 RX ORDER — CIPROFLOXACIN 500 MG/1
500 TABLET, FILM COATED ORAL ONCE
Status: COMPLETED | OUTPATIENT
Start: 2023-04-14 | End: 2023-04-14

## 2023-04-14 RX ADMIN — CIPROFLOXACIN 500 MG: 500 TABLET, FILM COATED ORAL at 11:37:00

## 2023-04-14 NOTE — PROGRESS NOTES
Called patient regarding CCM monthly and left a message for patient to call back when they can. Reviewed patient chart. Time Spent This Encounter Total: 3  min medical record review  Monthly Minute Total including today: 3 minutes    Maximilian Calabrese, [address] [place] [address]  Phone: [phone]. Time [place] [address]

## 2023-04-25 ENCOUNTER — HOSPITAL ENCOUNTER (OUTPATIENT)
Dept: MRI IMAGING | Facility: HOSPITAL | Age: 68
Discharge: HOME | End: 2023-04-25
Attending: NURSE PRACTITIONER
Payer: MEDICARE

## 2023-04-25 DIAGNOSIS — R97.20 ELEVATED PSA: ICD-10-CM

## 2023-04-25 DIAGNOSIS — R97.20 ELEVATED PSA: Primary | ICD-10-CM

## 2023-04-25 PROCEDURE — 72197 MRI PELVIS W/O & W/DYE: CPT | Performed by: NURSE PRACTITIONER

## 2023-04-25 PROCEDURE — A9575 INJ GADOTERATE MEGLUMI 0.1ML: HCPCS | Performed by: NURSE PRACTITIONER

## 2023-04-25 RX ORDER — GADOTERATE MEGLUMINE 376.9 MG/ML
20 INJECTION INTRAVENOUS
Status: COMPLETED | OUTPATIENT
Start: 2023-04-25 | End: 2023-04-25

## 2023-04-25 RX ADMIN — GADOTERATE MEGLUMINE 20 ML: 376.9 INJECTION INTRAVENOUS at 11:01:00

## 2023-05-08 ENCOUNTER — OFFICE VISIT (OUTPATIENT)
Dept: INTERNAL MEDICINE CLINIC | Facility: CLINIC | Age: 68
End: 2023-05-08
Payer: MEDICARE

## 2023-05-08 VITALS
BODY MASS INDEX: 31.98 KG/M2 | TEMPERATURE: 97 F | SYSTOLIC BLOOD PRESSURE: 126 MMHG | HEART RATE: 82 BPM | HEIGHT: 66 IN | WEIGHT: 199 LBS | DIASTOLIC BLOOD PRESSURE: 82 MMHG | RESPIRATION RATE: 16 BRPM | OXYGEN SATURATION: 98 %

## 2023-05-08 DIAGNOSIS — R73.02 IGT (IMPAIRED GLUCOSE TOLERANCE): ICD-10-CM

## 2023-05-08 DIAGNOSIS — M47.816 LUMBAR SPONDYLOSIS: ICD-10-CM

## 2023-05-08 DIAGNOSIS — G25.81 RLS (RESTLESS LEGS SYNDROME): ICD-10-CM

## 2023-05-08 DIAGNOSIS — N40.0 BENIGN PROSTATIC HYPERPLASIA, UNSPECIFIED WHETHER LOWER URINARY TRACT SYMPTOMS PRESENT: ICD-10-CM

## 2023-05-08 DIAGNOSIS — R97.20 ELEVATED PSA: ICD-10-CM

## 2023-05-08 DIAGNOSIS — Z00.00 ENCOUNTER FOR ANNUAL HEALTH EXAMINATION: Primary | ICD-10-CM

## 2023-05-08 DIAGNOSIS — G89.29 CHRONIC BILATERAL LOW BACK PAIN WITHOUT SCIATICA: ICD-10-CM

## 2023-05-08 DIAGNOSIS — F01.B0 MODERATE VASCULAR DEMENTIA WITHOUT BEHAVIORAL DISTURBANCE, PSYCHOTIC DISTURBANCE, MOOD DISTURBANCE, OR ANXIETY (HCC): ICD-10-CM

## 2023-05-08 DIAGNOSIS — I77.9 BILATERAL CAROTID ARTERY DISEASE, UNSPECIFIED TYPE (HCC): ICD-10-CM

## 2023-05-08 DIAGNOSIS — I25.10 ATHEROSCLEROSIS OF NATIVE CORONARY ARTERY OF NATIVE HEART WITHOUT ANGINA PECTORIS: ICD-10-CM

## 2023-05-08 DIAGNOSIS — G47.33 OSA (OBSTRUCTIVE SLEEP APNEA): ICD-10-CM

## 2023-05-08 DIAGNOSIS — F33.1 MAJOR DEPRESSIVE DISORDER, RECURRENT, MODERATE (HCC): ICD-10-CM

## 2023-05-08 DIAGNOSIS — E78.5 DYSLIPIDEMIA: ICD-10-CM

## 2023-05-08 DIAGNOSIS — N18.32 STAGE 3B CHRONIC KIDNEY DISEASE (HCC): ICD-10-CM

## 2023-05-08 DIAGNOSIS — I48.0 PAROXYSMAL ATRIAL FIBRILLATION (HCC): ICD-10-CM

## 2023-05-08 DIAGNOSIS — R63.4 WEIGHT LOSS: ICD-10-CM

## 2023-05-08 DIAGNOSIS — G44.86 CERVICOGENIC HEADACHE: ICD-10-CM

## 2023-05-08 DIAGNOSIS — I10 PRIMARY HYPERTENSION: ICD-10-CM

## 2023-05-08 DIAGNOSIS — M54.50 CHRONIC BILATERAL LOW BACK PAIN WITHOUT SCIATICA: ICD-10-CM

## 2023-05-08 DIAGNOSIS — Z86.73 HISTORY OF STROKE: ICD-10-CM

## 2023-05-08 PROBLEM — R42 DIZZINESS AND GIDDINESS: Status: RESOLVED | Noted: 2018-02-22 | Resolved: 2023-05-08

## 2023-05-08 RX ORDER — TRAMADOL HYDROCHLORIDE 50 MG/1
50 TABLET ORAL NIGHTLY PRN
Qty: 90 TABLET | Refills: 0 | Status: SHIPPED | OUTPATIENT
Start: 2023-05-08

## 2023-05-08 RX ORDER — MIRTAZAPINE 15 MG/1
15 TABLET, FILM COATED ORAL NIGHTLY
Qty: 90 TABLET | Refills: 0 | Status: SHIPPED | OUTPATIENT
Start: 2023-05-08

## 2023-05-08 NOTE — PATIENT INSTRUCTIONS
COVID boosters recommended    Check with your insurance to verify coverage for the Shingrix vaccine for shingles. Also check to see where you can go to get the vaccine. [id number] vaccine recommended    Continue your current medication. Continue to see the specialists. Follow up in 3-6 months depending labs or sooner as needed. Shukri Zamora's SCREENING SCHEDULE   Tests on this list are recommended by your physician but may not be covered, or covered at this frequency, by your insurer. Please check with your insurance carrier before scheduling to verify coverage.    PREVENTATIVE SERVICES FREQUENCY &  COVERAGE DETAILS LAST COMPLETION DATE   Diabetes Screening    Fasting Blood Sugar / Glucose    One screening every 12 months if never tested or if previously tested but not diagnosed with pre-diabetes   One screening every 6 months if diagnosed with pre-diabetes Lab Results   Component Value Date     (H) 02/21/2023        Cardiovascular Disease Screening    Lipid Panel  Cholesterol  Lipoprotein (HDL)  Triglycerides Covered every 5 years for all Medicare beneficiaries without apparent signs or symptoms of cardiovascular disease Lab Results   Component Value Date    CHOLEST 123 02/21/2023    HDL 35 (L) 02/21/2023    LDL 58 02/21/2023    LDLD 138 (H) 06/02/2020    TRIG 178 (H) 02/21/2023         Electrocardiogram (EKG)   Covered if needed at Welcome to Medicare, and non-screening if indicated for medical reasons 05/21/2021      Ultrasound Screening for Abdominal Aortic Aneurysm (AAA) Covered once in a lifetime for one of the following risk factors    Men who are 73-68 years old and have ever smoked    Anyone with a family history -     Colorectal Cancer Screening  Covered for ages 52-80; only need ONE of the following:    Colonoscopy   Covered every 10 years    Covered every 2 years if patient is at high risk or previous colonoscopy was abnormal -    Colorectal Cancer Screening Never done Flexible Sigmoidoscopy   Covered every 4 years -    Fecal Occult Blood Test Covered annually -   Prostate Cancer Screening    Prostate-Specific Antigen (PSA) Annually Lab Results   Component Value Date    PSA 5.81 (H) 03/08/2023     PSA due on 03/08/2025   Immunizations    Influenza Covered once per flu season  Please get every year -  No recommendations at this time    Pneumococcal Each vaccine ([id number] & [id number]) covered once after 65 Prevnar 13: -    [id number]: 10/07/2019     Pneumococcal Vaccination(2 - PCV) due on 10/07/2020    Hepatitis B One screening covered for patients with certain risk factors   -  No recommendations at this time    Tetanus Toxoid Not covered by Medicare Part B unless medically necessary (cut with metal); may be covered with your pharmacy prescription benefits -    Tetanus, Diptheria and Pertusis TD and TDaP Not covered by Medicare Part B -  No recommendations at this time    Zoster Not covered by Medicare Part B; may be covered with your pharmacy  prescription benefits -  Zoster Vaccines(1 of 2) Never done      [facility]   Tests on this list are recommended by your physician but may not be covered, or covered at this frequency, by your insurer. Please check with your insurance carrier before scheduling to verify coverage.    PREVENTATIVE SERVICES FREQUENCY &  COVERAGE DETAILS LAST COMPLETION DATE   Diabetes Screening    Fasting Blood Sugar / Glucose    One screening every 12 months if never tested or if previously tested but not diagnosed with pre-diabetes   One screening every 6 months if diagnosed with pre-diabetes Lab Results   Component Value Date     (H) 02/21/2023        Cardiovascular Disease Screening    Lipid Panel  Cholesterol  Lipoprotein (HDL)  Triglycerides Covered every 5 years for all Medicare beneficiaries without apparent signs or symptoms of cardiovascular disease Lab Results   Component Value Date    CVFVBBB 177 02/21/2023    HDL 35 (L) 02/21/2023    LDL 58 02/21/2023    LDLD 138 (H) 06/02/2020    TRIG 178 (H) 02/21/2023         Electrocardiogram (EKG)   Covered if needed at Welcome to Medicare, and non-screening if indicated for medical reasons 05/21/2021      Ultrasound Screening for Abdominal Aortic Aneurysm (AAA) Covered once in a lifetime for one of the following risk factors    Men who are 73-68 years old and have ever smoked    Anyone with a family history -     Colorectal Cancer Screening  Covered for ages 52-80; only need ONE of the following:    Colonoscopy   Covered every 10 years    Covered every 2 years if patient is at high risk or previous colonoscopy was abnormal -    Colorectal Cancer Screening Never done    Flexible Sigmoidoscopy   Covered every 4 years -    Fecal Occult Blood Test Covered annually -   Prostate Cancer Screening    Prostate-Specific Antigen (PSA) Annually Lab Results   Component Value Date    PSA 5.81 (H) 03/08/2023     PSA due on 03/08/2025   Immunizations    Influenza Covered once per flu season  Please get every year -  No recommendations at this time    Pneumococcal Each vaccine ([id number] & [id number]) covered once after 65 Prevnar 13: -    Wdpsockzl58: 10/07/2019     Pneumococcal Vaccination(2 - PCV) due on 10/07/2020    Hepatitis B One screening covered for patients with certain risk factors   -  No recommendations at this time    Tetanus Toxoid Not covered by Medicare Part B unless medically necessary (cut with metal); may be covered with your pharmacy prescription benefits -    Tetanus, Diptheria and Pertusis TD and TDaP Not covered by Medicare Part B -  No recommendations at this time    Zoster Not covered by Medicare Part B; may be covered with your pharmacy  prescription benefits -  Zoster Vaccines(1 of 2) Never done

## 2023-05-14 ENCOUNTER — HOSPITAL ENCOUNTER (INPATIENT)
Age: 68
LOS: 2 days | Discharge: HOME HEALTH | DRG: 247 | End: 2023-05-16
Attending: EMERGENCY MEDICINE | Admitting: INTERNAL MEDICINE

## 2023-05-14 ENCOUNTER — HOSPITAL ENCOUNTER (OUTPATIENT)
Age: 68
Setting detail: SURGERY ADMIT
End: 2023-05-14

## 2023-05-14 ENCOUNTER — APPOINTMENT (OUTPATIENT)
Dept: GENERAL RADIOLOGY | Age: 68
DRG: 247 | End: 2023-05-14
Attending: EMERGENCY MEDICINE

## 2023-05-14 DIAGNOSIS — I21.3 ST ELEVATION MYOCARDIAL INFARCTION (STEMI), UNSPECIFIED ARTERY (CMD): Primary | ICD-10-CM

## 2023-05-14 DIAGNOSIS — I21.02 ST ELEVATION MYOCARDIAL INFARCTION INVOLVING LEFT ANTERIOR DESCENDING (LAD) CORONARY ARTERY (CMD): ICD-10-CM

## 2023-05-14 DIAGNOSIS — R07.9 CHEST PAIN, UNSPECIFIED TYPE: ICD-10-CM

## 2023-05-14 LAB
ABO + RH BLD: NORMAL
ALBUMIN SERPL-MCNC: 3.5 G/DL (ref 3.6–5.1)
ALBUMIN/GLOB SERPL: 0.9 {RATIO} (ref 1–2.4)
ALP SERPL-CCNC: 94 UNITS/L (ref 45–117)
ALT SERPL-CCNC: 25 UNITS/L
ANION GAP SERPL CALC-SCNC: 11 MMOL/L (ref 7–19)
APTT PPP: 30 SEC (ref 22–30)
AST SERPL-CCNC: 41 UNITS/L
BASOPHILS # BLD: 0 K/MCL (ref 0–0.3)
BASOPHILS NFR BLD: 0 %
BILIRUB SERPL-MCNC: 1.4 MG/DL (ref 0.2–1)
BLD GP AB SCN SERPL QL GEL: NEGATIVE
BUN SERPL-MCNC: 25 MG/DL (ref 6–20)
BUN/CREAT SERPL: 18 (ref 7–25)
CALCIUM SERPL-MCNC: 8.8 MG/DL (ref 8.4–10.2)
CHLORIDE SERPL-SCNC: 103 MMOL/L (ref 97–110)
CK SERPL-CCNC: 172 UNITS/L (ref 39–308)
CO2 SERPL-SCNC: 22 MMOL/L (ref 21–32)
CREAT SERPL-MCNC: 1.42 MG/DL (ref 0.67–1.17)
DEPRECATED RDW RBC: 53.6 FL (ref 39–50)
EOSINOPHIL # BLD: 0.1 K/MCL (ref 0–0.5)
EOSINOPHIL NFR BLD: 1 %
ERYTHROCYTE [DISTWIDTH] IN BLOOD: 14.1 % (ref 11–15)
FASTING DURATION TIME PATIENT: ABNORMAL H
GFR SERPLBLD BASED ON 1.73 SQ M-ARVRAT: 54 ML/MIN
GLOBULIN SER-MCNC: 3.9 G/DL (ref 2–4)
GLUCOSE BLDC GLUCOMTR-MCNC: 107 MG/DL (ref 70–99)
GLUCOSE SERPL-MCNC: 150 MG/DL (ref 70–99)
HCT VFR BLD CALC: 47.3 % (ref 39–51)
HGB BLD-MCNC: 16.6 G/DL (ref 13–17)
IMM GRANULOCYTES # BLD AUTO: 0 K/MCL (ref 0–0.2)
IMM GRANULOCYTES # BLD: 0 %
INR PPP: 1.1
LYMPHOCYTES # BLD: 1.9 K/MCL (ref 1–4)
LYMPHOCYTES NFR BLD: 17 %
MAGNESIUM SERPL-MCNC: 2.2 MG/DL (ref 1.7–2.4)
MCH RBC QN AUTO: 36.2 PG (ref 26–34)
MCHC RBC AUTO-ENTMCNC: 35.1 G/DL (ref 32–36.5)
MCV RBC AUTO: 103.3 FL (ref 78–100)
MONOCYTES # BLD: 0.9 K/MCL (ref 0.3–0.9)
MONOCYTES NFR BLD: 8 %
NEUTROPHILS # BLD: 8.2 K/MCL (ref 1.8–7.7)
NEUTROPHILS NFR BLD: 74 %
NRBC BLD MANUAL-RTO: 0 /100 WBC
PLATELET # BLD AUTO: 290 K/MCL (ref 140–450)
POTASSIUM SERPL-SCNC: 3.7 MMOL/L (ref 3.4–5.1)
PROT SERPL-MCNC: 7.4 G/DL (ref 6.4–8.2)
PROTHROMBIN TIME: 10.9 SEC (ref 9.7–11.8)
RBC # BLD: 4.58 MIL/MCL (ref 4.5–5.9)
SODIUM SERPL-SCNC: 132 MMOL/L (ref 135–145)
TROPONIN I SERPL DL<=0.01 NG/ML-MCNC: 7007 NG/L
TYPE AND SCREEN EXPIRATION DATE: NORMAL
WBC # BLD: 11.1 K/MCL (ref 4.2–11)

## 2023-05-14 PROCEDURE — C1769 GUIDE WIRE: HCPCS | Performed by: INTERNAL MEDICINE

## 2023-05-14 PROCEDURE — C1874 STENT, COATED/COV W/DEL SYS: HCPCS | Performed by: INTERNAL MEDICINE

## 2023-05-14 PROCEDURE — 85730 THROMBOPLASTIN TIME PARTIAL: CPT | Performed by: EMERGENCY MEDICINE

## 2023-05-14 PROCEDURE — C1894 INTRO/SHEATH, NON-LASER: HCPCS | Performed by: INTERNAL MEDICINE

## 2023-05-14 PROCEDURE — 10002803 HB RX 637: Performed by: INTERNAL MEDICINE

## 2023-05-14 PROCEDURE — 10000008 HB ROOM CHARGE ICU OR CCU

## 2023-05-14 PROCEDURE — 82962 GLUCOSE BLOOD TEST: CPT

## 2023-05-14 PROCEDURE — 10002800 HB RX 250 W HCPCS

## 2023-05-14 PROCEDURE — B240ZZ3 ULTRASONOGRAPHY OF SINGLE CORONARY ARTERY, INTRAVASCULAR: ICD-10-PCS | Performed by: INTERNAL MEDICINE

## 2023-05-14 PROCEDURE — 10002801 HB RX 250 W/O HCPCS: Performed by: INTERNAL MEDICINE

## 2023-05-14 PROCEDURE — 92941 PRQ TRLML REVSC TOT OCCL AMI: CPT | Performed by: INTERNAL MEDICINE

## 2023-05-14 PROCEDURE — 83735 ASSAY OF MAGNESIUM: CPT | Performed by: EMERGENCY MEDICINE

## 2023-05-14 PROCEDURE — 10002805 HB CONTRAST AGENT: Performed by: INTERNAL MEDICINE

## 2023-05-14 PROCEDURE — 93454 CORONARY ARTERY ANGIO S&I: CPT | Performed by: INTERNAL MEDICINE

## 2023-05-14 PROCEDURE — 93458 L HRT ARTERY/VENTRICLE ANGIO: CPT | Performed by: INTERNAL MEDICINE

## 2023-05-14 PROCEDURE — C1887 CATHETER, GUIDING: HCPCS | Performed by: INTERNAL MEDICINE

## 2023-05-14 PROCEDURE — C1753 CATH, INTRAVAS ULTRASOUND: HCPCS | Performed by: INTERNAL MEDICINE

## 2023-05-14 PROCEDURE — 82550 ASSAY OF CK (CPK): CPT | Performed by: EMERGENCY MEDICINE

## 2023-05-14 PROCEDURE — C1760 CLOSURE DEV, VASC: HCPCS | Performed by: INTERNAL MEDICINE

## 2023-05-14 PROCEDURE — 10004651 HB RX, NO CHARGE ITEM: Performed by: INTERNAL MEDICINE

## 2023-05-14 PROCEDURE — 027035Z DILATION OF CORONARY ARTERY, ONE ARTERY WITH TWO DRUG-ELUTING INTRALUMINAL DEVICES, PERCUTANEOUS APPROACH: ICD-10-PCS | Performed by: INTERNAL MEDICINE

## 2023-05-14 PROCEDURE — 80053 COMPREHEN METABOLIC PANEL: CPT | Performed by: EMERGENCY MEDICINE

## 2023-05-14 PROCEDURE — 4A023N7 MEASUREMENT OF CARDIAC SAMPLING AND PRESSURE, LEFT HEART, PERCUTANEOUS APPROACH: ICD-10-PCS | Performed by: INTERNAL MEDICINE

## 2023-05-14 PROCEDURE — 10002800 HB RX 250 W HCPCS: Performed by: INTERNAL MEDICINE

## 2023-05-14 PROCEDURE — 96372 THER/PROPH/DIAG INJ SC/IM: CPT | Performed by: INTERNAL MEDICINE

## 2023-05-14 PROCEDURE — C1725 CATH, TRANSLUMIN NON-LASER: HCPCS | Performed by: INTERNAL MEDICINE

## 2023-05-14 PROCEDURE — 93005 ELECTROCARDIOGRAM TRACING: CPT | Performed by: EMERGENCY MEDICINE

## 2023-05-14 PROCEDURE — B2111ZZ FLUOROSCOPY OF MULTIPLE CORONARY ARTERIES USING LOW OSMOLAR CONTRAST: ICD-10-PCS | Performed by: INTERNAL MEDICINE

## 2023-05-14 PROCEDURE — 10002807 HB RX 258: Performed by: INTERNAL MEDICINE

## 2023-05-14 PROCEDURE — 99152 MOD SED SAME PHYS/QHP 5/>YRS: CPT | Performed by: INTERNAL MEDICINE

## 2023-05-14 PROCEDURE — 92978 ENDOLUMINL IVUS OCT C 1ST: CPT | Performed by: INTERNAL MEDICINE

## 2023-05-14 PROCEDURE — 85610 PROTHROMBIN TIME: CPT | Performed by: EMERGENCY MEDICINE

## 2023-05-14 PROCEDURE — 86901 BLOOD TYPING SEROLOGIC RH(D): CPT | Performed by: EMERGENCY MEDICINE

## 2023-05-14 PROCEDURE — 99291 CRITICAL CARE FIRST HOUR: CPT | Performed by: INTERNAL MEDICINE

## 2023-05-14 PROCEDURE — 85025 COMPLETE CBC W/AUTO DIFF WBC: CPT | Performed by: EMERGENCY MEDICINE

## 2023-05-14 PROCEDURE — 99153 MOD SED SAME PHYS/QHP EA: CPT | Performed by: INTERNAL MEDICINE

## 2023-05-14 PROCEDURE — 10006027 HB SUPPLY 278: Performed by: INTERNAL MEDICINE

## 2023-05-14 PROCEDURE — 10006023 HB SUPPLY 272: Performed by: INTERNAL MEDICINE

## 2023-05-14 PROCEDURE — 99285 EMERGENCY DEPT VISIT HI MDM: CPT

## 2023-05-14 PROCEDURE — 36415 COLL VENOUS BLD VENIPUNCTURE: CPT | Performed by: PATHOLOGY

## 2023-05-14 PROCEDURE — 84484 ASSAY OF TROPONIN QUANT: CPT | Performed by: EMERGENCY MEDICINE

## 2023-05-14 DEVICE — MYNXGRIP 6F/7F
Type: IMPLANTABLE DEVICE | Site: FEMORAL ARTERY | Status: FUNCTIONAL
Brand: MYNXGRIP

## 2023-05-14 DEVICE — IMPLANTABLE DEVICE
Type: IMPLANTABLE DEVICE | Site: RIGHT CORONARY ARTERY | Status: FUNCTIONAL
Brand: ORSIRO MISSION

## 2023-05-14 RX ORDER — NITROGLYCERIN 0.4 MG/1
0.4 TABLET SUBLINGUAL EVERY 5 MIN PRN
Status: ACTIVE | OUTPATIENT
Start: 2023-05-14 | End: 2023-05-15

## 2023-05-14 RX ORDER — ATORVASTATIN CALCIUM 80 MG/1
80 TABLET, FILM COATED ORAL NIGHTLY
Status: DISCONTINUED | OUTPATIENT
Start: 2023-05-14 | End: 2023-05-16

## 2023-05-14 RX ORDER — CLOPIDOGREL BISULFATE 75 MG/1
TABLET ORAL PRN
Status: DISCONTINUED | OUTPATIENT
Start: 2023-05-14 | End: 2023-05-14 | Stop reason: HOSPADM

## 2023-05-14 RX ORDER — ONDANSETRON 2 MG/ML
4 INJECTION INTRAMUSCULAR; INTRAVENOUS EVERY 12 HOURS PRN
Status: DISCONTINUED | OUTPATIENT
Start: 2023-05-14 | End: 2023-05-16 | Stop reason: HOSPADM

## 2023-05-14 RX ORDER — 0.9 % SODIUM CHLORIDE 0.9 %
2 VIAL (ML) INJECTION EVERY 12 HOURS SCHEDULED
Status: DISCONTINUED | OUTPATIENT
Start: 2023-05-14 | End: 2023-05-16 | Stop reason: HOSPADM

## 2023-05-14 RX ORDER — ENOXAPARIN SODIUM 100 MG/ML
40 INJECTION SUBCUTANEOUS DAILY
Status: DISCONTINUED | OUTPATIENT
Start: 2023-05-14 | End: 2023-05-16 | Stop reason: HOSPADM

## 2023-05-14 RX ORDER — TAMSULOSIN HYDROCHLORIDE 0.4 MG/1
0.4 CAPSULE ORAL
Status: DISCONTINUED | OUTPATIENT
Start: 2023-05-15 | End: 2023-05-16 | Stop reason: HOSPADM

## 2023-05-14 RX ORDER — DULOXETIN HYDROCHLORIDE 30 MG/1
60 CAPSULE, DELAYED RELEASE ORAL DAILY
Status: DISCONTINUED | OUTPATIENT
Start: 2023-05-15 | End: 2023-05-15

## 2023-05-14 RX ORDER — SODIUM CHLORIDE 9 MG/ML
INJECTION, SOLUTION INTRAVENOUS CONTINUOUS
Status: ACTIVE | OUTPATIENT
Start: 2023-05-14 | End: 2023-05-14

## 2023-05-14 RX ORDER — CLOPIDOGREL BISULFATE 75 MG/1
75 TABLET ORAL DAILY
Status: DISCONTINUED | OUTPATIENT
Start: 2023-05-15 | End: 2023-05-16 | Stop reason: HOSPADM

## 2023-05-14 RX ORDER — CLONIDINE HYDROCHLORIDE 0.1 MG/1
0.1 TABLET ORAL EVERY 4 HOURS PRN
Status: ACTIVE | OUTPATIENT
Start: 2023-05-14 | End: 2023-05-15

## 2023-05-14 RX ORDER — SODIUM CHLORIDE 9 MG/ML
INJECTION, SOLUTION INTRAVENOUS CONTINUOUS PRN
Status: DISCONTINUED | OUTPATIENT
Start: 2023-05-14 | End: 2023-05-15

## 2023-05-14 RX ORDER — ROPINIROLE 1 MG/1
1 TABLET, FILM COATED ORAL DAILY
Status: DISCONTINUED | OUTPATIENT
Start: 2023-05-15 | End: 2023-05-15

## 2023-05-14 RX ORDER — ACETAMINOPHEN 650 MG/1
650 SUPPOSITORY RECTAL EVERY 4 HOURS PRN
Status: DISCONTINUED | OUTPATIENT
Start: 2023-05-14 | End: 2023-05-16 | Stop reason: HOSPADM

## 2023-05-14 RX ORDER — LIDOCAINE HYDROCHLORIDE 20 MG/ML
INJECTION, SOLUTION EPIDURAL; INFILTRATION; INTRACAUDAL; PERINEURAL PRN
Status: DISCONTINUED | OUTPATIENT
Start: 2023-05-14 | End: 2023-05-14 | Stop reason: HOSPADM

## 2023-05-14 RX ORDER — ONDANSETRON 4 MG/1
4 TABLET, ORALLY DISINTEGRATING ORAL EVERY 12 HOURS PRN
Status: DISCONTINUED | OUTPATIENT
Start: 2023-05-14 | End: 2023-05-16 | Stop reason: HOSPADM

## 2023-05-14 RX ORDER — MIDAZOLAM HYDROCHLORIDE 1 MG/ML
INJECTION, SOLUTION INTRAMUSCULAR; INTRAVENOUS PRN
Status: DISCONTINUED | OUTPATIENT
Start: 2023-05-14 | End: 2023-05-14 | Stop reason: HOSPADM

## 2023-05-14 RX ORDER — HYDRALAZINE HYDROCHLORIDE 20 MG/ML
10 INJECTION INTRAMUSCULAR; INTRAVENOUS EVERY 4 HOURS PRN
Status: ACTIVE | OUTPATIENT
Start: 2023-05-14 | End: 2023-05-15

## 2023-05-14 RX ORDER — 0.9 % SODIUM CHLORIDE 0.9 %
2 VIAL (ML) INJECTION EVERY 12 HOURS SCHEDULED
Status: DISCONTINUED | OUTPATIENT
Start: 2023-05-14 | End: 2023-05-15

## 2023-05-14 RX ORDER — METOPROLOL SUCCINATE 25 MG/1
25 TABLET, EXTENDED RELEASE ORAL DAILY
Status: DISCONTINUED | OUTPATIENT
Start: 2023-05-14 | End: 2023-05-16 | Stop reason: HOSPADM

## 2023-05-14 RX ORDER — HALOPERIDOL 5 MG/ML
2 INJECTION INTRAMUSCULAR EVERY 6 HOURS PRN
Status: DISCONTINUED | OUTPATIENT
Start: 2023-05-14 | End: 2023-05-16 | Stop reason: HOSPADM

## 2023-05-14 RX ORDER — ASPIRIN 81 MG/1
81 TABLET, CHEWABLE ORAL DAILY
Status: DISCONTINUED | OUTPATIENT
Start: 2023-05-15 | End: 2023-05-16 | Stop reason: HOSPADM

## 2023-05-14 RX ORDER — HALOPERIDOL 5 MG/ML
INJECTION INTRAMUSCULAR
Status: COMPLETED
Start: 2023-05-14 | End: 2023-05-14

## 2023-05-14 RX ORDER — IODIXANOL 320 MG/ML
INJECTION, SOLUTION INTRAVASCULAR PRN
Status: DISCONTINUED | OUTPATIENT
Start: 2023-05-14 | End: 2023-05-14 | Stop reason: HOSPADM

## 2023-05-14 RX ORDER — ACETAMINOPHEN 325 MG/1
650 TABLET ORAL EVERY 4 HOURS PRN
Status: DISCONTINUED | OUTPATIENT
Start: 2023-05-14 | End: 2023-05-16 | Stop reason: HOSPADM

## 2023-05-14 RX ADMIN — ATORVASTATIN CALCIUM 80 MG: 80 TABLET, FILM COATED ORAL at 20:08

## 2023-05-14 RX ADMIN — HALOPERIDOL LACTATE 5 MG: 5 INJECTION, SOLUTION INTRAMUSCULAR at 21:45

## 2023-05-14 RX ADMIN — SODIUM CHLORIDE: 9 INJECTION, SOLUTION INTRAVENOUS at 18:59

## 2023-05-14 RX ADMIN — ENOXAPARIN SODIUM 40 MG: 40 INJECTION SUBCUTANEOUS at 20:08

## 2023-05-14 RX ADMIN — METOPROLOL SUCCINATE 25 MG: 25 TABLET, EXTENDED RELEASE ORAL at 20:08

## 2023-05-14 RX ADMIN — SODIUM CHLORIDE, PRESERVATIVE FREE 2 ML: 5 INJECTION INTRAVENOUS at 20:09

## 2023-05-14 ASSESSMENT — PAIN SCALES - GENERAL: PAINLEVEL_OUTOF10: 0

## 2023-05-15 ENCOUNTER — TELEPHONE (OUTPATIENT)
Dept: INTERNAL MEDICINE CLINIC | Facility: CLINIC | Age: 68
End: 2023-05-15

## 2023-05-15 ENCOUNTER — APPOINTMENT (OUTPATIENT)
Dept: CARDIOLOGY | Age: 68
DRG: 247 | End: 2023-05-15
Attending: INTERNAL MEDICINE

## 2023-05-15 LAB
ANION GAP SERPL CALC-SCNC: 8 MMOL/L (ref 7–19)
AORTIC VALVE AREA (AVA): 0.65
ASCENDING AORTA (AAD): 3
ATRIAL RATE (BPM): 91
ATRIAL RATE (BPM): 96
AV MEAN GRADIENT (AVMG): 2
AV MEAN VELOCITY (AVMV): 0.69
AV PEAK GRADIENT (AVPG): 5
AV PEAK VELOCITY (AVPV): 1.12
AV STENOSIS SEVERITY TEXT: NORMAL
AVI LVOT PEAK GRADIENT (LVOTMG): 0.7
BASOPHILS # BLD: 0.1 K/MCL (ref 0–0.3)
BASOPHILS NFR BLD: 1 %
BUN SERPL-MCNC: 18 MG/DL (ref 6–20)
BUN/CREAT SERPL: 16 (ref 7–25)
CALCIUM SERPL-MCNC: 8.7 MG/DL (ref 8.4–10.2)
CHLORIDE SERPL-SCNC: 107 MMOL/L (ref 97–110)
CHOLEST SERPL-MCNC: 68 MG/DL
CHOLEST/HDLC SERPL: 2 {RATIO}
CO2 SERPL-SCNC: 23 MMOL/L (ref 21–32)
CREAT SERPL-MCNC: 1.11 MG/DL (ref 0.67–1.17)
DEPRECATED RDW RBC: 55.6 FL (ref 39–50)
E WAVE DECELARATION TIME (MDT): 14.25
EOSINOPHIL # BLD: 0.1 K/MCL (ref 0–0.5)
EOSINOPHIL NFR BLD: 1 %
ERYTHROCYTE [DISTWIDTH] IN BLOOD: 14.2 % (ref 11–15)
FASTING DURATION TIME PATIENT: ABNORMAL H
GFR SERPLBLD BASED ON 1.73 SQ M-ARVRAT: 73 ML/MIN
GLUCOSE SERPL-MCNC: 108 MG/DL (ref 70–99)
HCT VFR BLD CALC: 43.8 % (ref 39–51)
HDLC SERPL-MCNC: 34 MG/DL
HGB BLD-MCNC: 14.8 G/DL (ref 13–17)
IMM GRANULOCYTES # BLD AUTO: 0 K/MCL (ref 0–0.2)
IMM GRANULOCYTES # BLD: 0 %
INTERVENTRICULAR SEPTUM IN END DIASTOLE (IVSD): 1.77
LDLC SERPL CALC-MCNC: 16 MG/DL
LEFT INTERNAL DIMENSION IN SYSTOLE (LVSD): 0.7
LEFT VENTRICULAR INTERNAL DIMENSION IN DIASTOLE (LVDD): 2.9
LEFT VENTRICULAR POSTERIOR WALL IN END DIASTOLE (LVPW): 4.5
LV EF: NORMAL %
LYMPHOCYTES # BLD: 1.4 K/MCL (ref 1–4)
LYMPHOCYTES NFR BLD: 16 %
MCH RBC QN AUTO: 35.7 PG (ref 26–34)
MCHC RBC AUTO-ENTMCNC: 33.8 G/DL (ref 32–36.5)
MCV RBC AUTO: 105.5 FL (ref 78–100)
MONOCYTES # BLD: 0.8 K/MCL (ref 0.3–0.9)
MONOCYTES NFR BLD: 9 %
MV E TISSUE VEL MED (MESV): 5.87
MV E WAVE VEL/E TISSUE VEL MED(MSR): 4.57
MV PEAK A VELOCITY (MVPAV): 211
MV PEAK E VELOCITY (MVPEV): 0.9
NEUTROPHILS # BLD: 6 K/MCL (ref 1.8–7.7)
NEUTROPHILS NFR BLD: 73 %
NONHDLC SERPL-MCNC: 34 MG/DL
NRBC BLD MANUAL-RTO: 0 /100 WBC
P AXIS (DEGREES): 51
P AXIS (DEGREES): 53
PLATELET # BLD AUTO: 230 K/MCL (ref 140–450)
POTASSIUM SERPL-SCNC: 3.8 MMOL/L (ref 3.4–5.1)
PR-INTERVAL (MSEC): 140
PR-INTERVAL (MSEC): 140
QRS-INTERVAL (MSEC): 100
QRS-INTERVAL (MSEC): 94
QT-INTERVAL (MSEC): 338
QT-INTERVAL (MSEC): 350
QTC: 427
QTC: 431
R AXIS (DEGREES): -43
R AXIS (DEGREES): -51
RAINBOW EXTRA TUBES HOLD SPECIMEN: NORMAL
RBC # BLD: 4.15 MIL/MCL (ref 4.5–5.9)
REPORT TEXT: NORMAL
REPORT TEXT: NORMAL
RV END SYSTOLIC LONGITUDINAL STRAIN FREE WALL (RVGS): 2.5
SODIUM SERPL-SCNC: 134 MMOL/L (ref 135–145)
T AXIS (DEGREES): 70
T AXIS (DEGREES): 73
TRICUSPID VALVE ANNULAR PEAK VELOCITY (TVAPV): 27
TRICUSPID VALVE PEAK REGURGITATION VELOCITY (TRPV): 3
TRIGL SERPL-MCNC: 89 MG/DL
TV ESTIMATED RIGHT ARTERIAL PRESSURE (RAP): 14.6
VENTRICULAR RATE EKG/MIN (BPM): 91
VENTRICULAR RATE EKG/MIN (BPM): 96
WBC # BLD: 8.2 K/MCL (ref 4.2–11)

## 2023-05-15 PROCEDURE — 10002803 HB RX 637: Performed by: INTERNAL MEDICINE

## 2023-05-15 PROCEDURE — 36415 COLL VENOUS BLD VENIPUNCTURE: CPT | Performed by: INTERNAL MEDICINE

## 2023-05-15 PROCEDURE — 10002800 HB RX 250 W HCPCS: Performed by: INTERNAL MEDICINE

## 2023-05-15 PROCEDURE — 93306 TTE W/DOPPLER COMPLETE: CPT | Performed by: INTERNAL MEDICINE

## 2023-05-15 PROCEDURE — 80061 LIPID PANEL: CPT | Performed by: INTERNAL MEDICINE

## 2023-05-15 PROCEDURE — 99233 SBSQ HOSP IP/OBS HIGH 50: CPT | Performed by: INTERNAL MEDICINE

## 2023-05-15 PROCEDURE — 10006031 HB ROOM CHARGE TELEMETRY

## 2023-05-15 PROCEDURE — 10004651 HB RX, NO CHARGE ITEM: Performed by: INTERNAL MEDICINE

## 2023-05-15 PROCEDURE — 80048 BASIC METABOLIC PNL TOTAL CA: CPT | Performed by: INTERNAL MEDICINE

## 2023-05-15 PROCEDURE — 93306 TTE W/DOPPLER COMPLETE: CPT

## 2023-05-15 PROCEDURE — 10002805 HB CONTRAST AGENT: Performed by: INTERNAL MEDICINE

## 2023-05-15 PROCEDURE — 85025 COMPLETE CBC W/AUTO DIFF WBC: CPT | Performed by: INTERNAL MEDICINE

## 2023-05-15 PROCEDURE — 96372 THER/PROPH/DIAG INJ SC/IM: CPT | Performed by: INTERNAL MEDICINE

## 2023-05-15 RX ORDER — MIRTAZAPINE 30 MG/1
15 TABLET, FILM COATED ORAL NIGHTLY
Status: DISCONTINUED | OUTPATIENT
Start: 2023-05-16 | End: 2023-05-16 | Stop reason: HOSPADM

## 2023-05-15 RX ORDER — TRAMADOL HYDROCHLORIDE 50 MG/1
50 TABLET ORAL AT BEDTIME
Status: ON HOLD | COMMUNITY
Start: 2023-05-20 | End: 2023-06-10 | Stop reason: HOSPADM

## 2023-05-15 RX ORDER — MIRTAZAPINE 15 MG/1
15 TABLET, FILM COATED ORAL NIGHTLY
COMMUNITY
Start: 2023-05-20

## 2023-05-15 RX ORDER — ROPINIROLE 0.25 MG/1
1 TABLET, FILM COATED ORAL 2 TIMES DAILY
Status: DISCONTINUED | OUTPATIENT
Start: 2023-05-16 | End: 2023-05-16 | Stop reason: HOSPADM

## 2023-05-15 RX ADMIN — ACETAMINOPHEN 650 MG: 325 TABLET ORAL at 20:45

## 2023-05-15 RX ADMIN — METOPROLOL SUCCINATE 25 MG: 25 TABLET, EXTENDED RELEASE ORAL at 09:39

## 2023-05-15 RX ADMIN — CLOPIDOGREL BISULFATE 75 MG: 75 TABLET, FILM COATED ORAL at 09:39

## 2023-05-15 RX ADMIN — ENOXAPARIN SODIUM 40 MG: 40 INJECTION SUBCUTANEOUS at 09:38

## 2023-05-15 RX ADMIN — TAMSULOSIN HYDROCHLORIDE 0.4 MG: 0.4 CAPSULE ORAL at 09:39

## 2023-05-15 RX ADMIN — SODIUM CHLORIDE, PRESERVATIVE FREE 2 ML: 5 INJECTION INTRAVENOUS at 20:45

## 2023-05-15 RX ADMIN — ASPIRIN 81 MG CHEWABLE TABLET 81 MG: 81 TABLET CHEWABLE at 09:39

## 2023-05-15 RX ADMIN — SODIUM CHLORIDE, PRESERVATIVE FREE 2 ML: 5 INJECTION INTRAVENOUS at 09:00

## 2023-05-15 RX ADMIN — PERFLUTREN 2 ML: 6.52 INJECTION, SUSPENSION INTRAVENOUS at 15:55

## 2023-05-15 RX ADMIN — ATORVASTATIN CALCIUM 80 MG: 80 TABLET, FILM COATED ORAL at 20:45

## 2023-05-15 RX ADMIN — DULOXETINE HYDROCHLORIDE 60 MG: 60 CAPSULE, DELAYED RELEASE ORAL at 09:39

## 2023-05-15 SDOH — HEALTH STABILITY: GENERAL
BECAUSE OF A PHYSICAL, MENTAL, OR EMOTIONAL CONDITION, DO YOU HAVE SERIOUS DIFFICULTY CONCENTRATING, REMEMBERING OR MAKING DECISIONS?: YES

## 2023-05-15 SDOH — ECONOMIC STABILITY: HOUSING INSECURITY: WHAT IS YOUR LIVING SITUATION TODAY?: FAMILY MEMBERS;OTHER (COMMENTS)

## 2023-05-15 SDOH — SOCIAL STABILITY: SOCIAL NETWORK: SUPPORT SYSTEMS: FAMILY MEMBERS

## 2023-05-15 SDOH — SOCIAL STABILITY: SOCIAL NETWORK
HOW OFTEN DO YOU SEE OR TALK TO PEOPLE THAT YOU CARE ABOUT AND FEEL CLOSE TO? (FOR EXAMPLE: TALKING TO FRIENDS ON THE PHONE, VISITING FRIENDS OR FAMILY, GOING TO CHURCH OR CLUB MEETINGS): I CHOOSE NOT TO ANSWER THE QUESTION

## 2023-05-15 SDOH — HEALTH STABILITY: PHYSICAL HEALTH: DO YOU HAVE SERIOUS DIFFICULTY WALKING OR CLIMBING STAIRS?: NO

## 2023-05-15 SDOH — ECONOMIC STABILITY: HOUSING INSECURITY: WHAT IS YOUR LIVING SITUATION TODAY?: STAYING WITH OTHERS

## 2023-05-15 SDOH — ECONOMIC STABILITY: TRANSPORTATION INSECURITY
IN THE PAST 12 MONTHS, HAS LACK OF TRANSPORTATION KEPT YOU FROM MEETINGS, WORK, OR FROM GETTING THINGS NEEDED FOR DAILY LIVING?: NO

## 2023-05-15 SDOH — ECONOMIC STABILITY: FOOD INSECURITY: HOW OFTEN IN THE PAST 12 MONTHS WERE YOU WORRIED OR STRESSED ABOUT HAVING ENOUGH MONEY TO BUY NUTRITIOUS MEALS?: NEVER

## 2023-05-15 SDOH — ECONOMIC STABILITY: HOUSING INSECURITY: ARE YOU WORRIED ABOUT LOSING YOUR HOUSING?: NO

## 2023-05-15 SDOH — ECONOMIC STABILITY: TRANSPORTATION INSECURITY
IN THE PAST 12 MONTHS, HAS THE LACK OF TRANSPORTATION KEPT YOU FROM MEDICAL APPOINTMENTS OR FROM GETTING MEDICATIONS?: NO

## 2023-05-15 SDOH — ECONOMIC STABILITY: GENERAL

## 2023-05-15 SDOH — HEALTH STABILITY: GENERAL: BECAUSE OF A PHYSICAL, MENTAL, OR EMOTIONAL CONDITION, DO YOU HAVE DIFFICULTY DOING ERRANDS ALONE?: YES

## 2023-05-15 SDOH — HEALTH STABILITY: PHYSICAL HEALTH: DO YOU HAVE DIFFICULTY DRESSING OR BATHING?: NO

## 2023-05-15 ASSESSMENT — COLUMBIA-SUICIDE SEVERITY RATING SCALE - C-SSRS
IS THE PATIENT ABLE TO COMPLETE C-SSRS: YES
2. HAVE YOU ACTUALLY HAD ANY THOUGHTS OF KILLING YOURSELF?: NO
6. HAVE YOU EVER DONE ANYTHING, STARTED TO DO ANYTHING, OR PREPARED TO DO ANYTHING TO END YOUR LIFE?: NO
1. WITHIN THE PAST MONTH, HAVE YOU WISHED YOU WERE DEAD OR WISHED YOU COULD GO TO SLEEP AND NOT WAKE UP?: NO

## 2023-05-15 ASSESSMENT — ACTIVITIES OF DAILY LIVING (ADL)
DRESSING: INDEPENDENT
BATHING: INDEPENDENT
TOILETING: INDEPENDENT
ADL_BEFORE_ADMISSION: NEEDS/REQUIRES ASSISTANCE
FEEDING: INDEPENDENT
RECENT_DECLINE_ADL: YES, DECLINE IN AMBULATION/TRANSFERRING, COLLABORATE WITH PROVIDER (T)
ADL_SHORT_OF_BREATH: NO
ADL_SCORE: 11

## 2023-05-15 ASSESSMENT — PATIENT HEALTH QUESTIONNAIRE - PHQ9
IS PATIENT ABLE TO COMPLETE PHQ2 OR PHQ9: YES
SUM OF ALL RESPONSES TO PHQ9 QUESTIONS 1 AND 2: 0
SUM OF ALL RESPONSES TO PHQ9 QUESTIONS 1 AND 2: 0
CLINICAL INTERPRETATION OF PHQ2 SCORE: NO FURTHER SCREENING NEEDED
2. FEELING DOWN, DEPRESSED OR HOPELESS: NOT AT ALL
1. LITTLE INTEREST OR PLEASURE IN DOING THINGS: NOT AT ALL

## 2023-05-15 ASSESSMENT — PAIN SCALES - GENERAL
PAINLEVEL_OUTOF10: 0
PAINLEVEL_OUTOF10: 7
PAINLEVEL_OUTOF10: 2
PAINLEVEL_OUTOF10: 0

## 2023-05-15 ASSESSMENT — ENCOUNTER SYMPTOMS
CONSTITUTIONAL NEGATIVE: 1
LIGHT-HEADEDNESS: 0
GASTROINTESTINAL NEGATIVE: 1
EYES NEGATIVE: 1
RESPIRATORY NEGATIVE: 1
CHEST TIGHTNESS: 1
DIZZINESS: 0

## 2023-05-15 ASSESSMENT — LIFESTYLE VARIABLES
AUDIT-C TOTAL SCORE: 1
HOW OFTEN DO YOU HAVE A DRINK CONTAINING ALCOHOL: MONTHLY OR LESS
HOW OFTEN DO YOU HAVE 6 OR MORE DRINKS ON ONE OCCASION: NEVER
ALCOHOL_USE_STATUS: NO OR LOW RISK WITH VALIDATED TOOL
HOW MANY STANDARD DRINKS CONTAINING ALCOHOL DO YOU HAVE ON A TYPICAL DAY: 0,1 OR 2

## 2023-05-15 NOTE — TELEPHONE ENCOUNTER
Care Everywhere Records Received    Updated Care Everywhere: yes    Date of Service: 5/14/23    From What Facility: [facility]    Speciality: cath lab    Type of Record: consult    Document Placed:steevn espinosa

## 2023-05-16 ENCOUNTER — TELEPHONE (OUTPATIENT)
Dept: INTERNAL MEDICINE CLINIC | Facility: CLINIC | Age: 68
End: 2023-05-16

## 2023-05-16 VITALS
BODY MASS INDEX: 30.86 KG/M2 | RESPIRATION RATE: 16 BRPM | OXYGEN SATURATION: 97 % | HEART RATE: 78 BPM | WEIGHT: 192.02 LBS | TEMPERATURE: 97.9 F | HEIGHT: 66 IN | SYSTOLIC BLOOD PRESSURE: 129 MMHG | DIASTOLIC BLOOD PRESSURE: 57 MMHG

## 2023-05-16 PROCEDURE — 10004651 HB RX, NO CHARGE ITEM: Performed by: INTERNAL MEDICINE

## 2023-05-16 PROCEDURE — 10002803 HB RX 637: Performed by: INTERNAL MEDICINE

## 2023-05-16 PROCEDURE — 97535 SELF CARE MNGMENT TRAINING: CPT

## 2023-05-16 PROCEDURE — 97166 OT EVAL MOD COMPLEX 45 MIN: CPT

## 2023-05-16 PROCEDURE — 10002800 HB RX 250 W HCPCS: Performed by: INTERNAL MEDICINE

## 2023-05-16 PROCEDURE — 96372 THER/PROPH/DIAG INJ SC/IM: CPT | Performed by: INTERNAL MEDICINE

## 2023-05-16 PROCEDURE — 97161 PT EVAL LOW COMPLEX 20 MIN: CPT

## 2023-05-16 PROCEDURE — 99233 SBSQ HOSP IP/OBS HIGH 50: CPT | Performed by: INTERNAL MEDICINE

## 2023-05-16 RX ORDER — ATORVASTATIN CALCIUM 40 MG/1
40 TABLET, FILM COATED ORAL NIGHTLY
Status: DISCONTINUED | OUTPATIENT
Start: 2023-05-16 | End: 2023-05-16 | Stop reason: HOSPADM

## 2023-05-16 RX ORDER — ATORVASTATIN CALCIUM 40 MG/1
40 TABLET, FILM COATED ORAL NIGHTLY
Qty: 30 TABLET | Refills: 0 | Status: SHIPPED | OUTPATIENT
Start: 2023-05-16 | End: 2023-05-16 | Stop reason: HOSPADM

## 2023-05-16 RX ORDER — ATORVASTATIN CALCIUM 40 MG/1
80 TABLET, FILM COATED ORAL DAILY
Qty: 30 TABLET | Refills: 0 | Status: SHIPPED | OUTPATIENT
Start: 2023-05-16

## 2023-05-16 RX ORDER — CLOPIDOGREL BISULFATE 75 MG/1
75 TABLET ORAL DAILY
Qty: 30 TABLET | Refills: 0 | Status: SHIPPED | OUTPATIENT
Start: 2023-05-17

## 2023-05-16 RX ORDER — ASPIRIN 81 MG/1
81 TABLET, CHEWABLE ORAL DAILY
Status: SHIPPED | COMMUNITY
Start: 2023-05-17

## 2023-05-16 RX ADMIN — METOPROLOL SUCCINATE 25 MG: 25 TABLET, EXTENDED RELEASE ORAL at 09:09

## 2023-05-16 RX ADMIN — ENOXAPARIN SODIUM 40 MG: 40 INJECTION SUBCUTANEOUS at 09:09

## 2023-05-16 RX ADMIN — CLOPIDOGREL BISULFATE 75 MG: 75 TABLET, FILM COATED ORAL at 09:09

## 2023-05-16 RX ADMIN — ASPIRIN 81 MG CHEWABLE TABLET 81 MG: 81 TABLET CHEWABLE at 09:09

## 2023-05-16 RX ADMIN — TAMSULOSIN HYDROCHLORIDE 0.4 MG: 0.4 CAPSULE ORAL at 09:09

## 2023-05-16 RX ADMIN — SODIUM CHLORIDE, PRESERVATIVE FREE 2 ML: 5 INJECTION INTRAVENOUS at 09:09

## 2023-05-16 RX ADMIN — ROPINIROLE HYDROCHLORIDE 1 MG: 0.25 TABLET, FILM COATED ORAL at 09:09

## 2023-05-16 ASSESSMENT — ACTIVITIES OF DAILY LIVING (ADL)
PRIOR_ADL_BATHING: INDEPENDENT
PRIOR_ADL_TOILETING: INDEPENDENT
HOME_MANAGEMENT_TIME_ENTRY: 15
GROOMING: INDEPENDENT
EATING: INDEPENDENT

## 2023-05-16 ASSESSMENT — COGNITIVE AND FUNCTIONAL STATUS - GENERAL
HELP NEEDED FOR BATHING: A LITTLE
DAILY_ACTIVITY_CONVERTED_SCORE: 42.03
HELP NEEDED DRESSING REGULAR LOWER BODY CLOTHING: A LITTLE
DAILY_ACTIVITY_RAW_SCORE: 20
HELP NEEDED DRESSING REGULAR UPPER BODY CLOTHING: A LITTLE
BASIC_MOBILITY_RAW_SCORE: 22
HELP NEEDED FOR TOILETING: A LITTLE
BASIC_MOBILITY_CONVERTED_SCORE: 47.40

## 2023-05-16 ASSESSMENT — PAIN SCALES - GENERAL: PAINLEVEL_OUTOF10: 0

## 2023-05-16 NOTE — TELEPHONE ENCOUNTER
Incoming (mail or fax):  fax  Received from:  Hayder Duong @ home  Documentation given to:  Triage incoming    [facility] start date 05/20/23 due to patient/family request

## 2023-05-17 ENCOUNTER — TELEPHONE (OUTPATIENT)
Dept: INTERNAL MEDICINE CLINIC | Facility: CLINIC | Age: 68
End: 2023-05-17

## 2023-05-17 NOTE — TELEPHONE ENCOUNTER
Received notice of delay of start of care. No review of orders or signature required. Sent for scanning.

## 2023-05-17 NOTE — TELEPHONE ENCOUNTER
Tell them I am sorry to hear that he went through all that. He only needs HFU if the hospital told him to do so with us. If they only said cardiology then no need to do with us unless they need the [facility] orderes signed by us. Sometimes the hospital signs, sometimes they dont. I am okay with it being VV. Thanks.

## 2023-05-17 NOTE — TELEPHONE ENCOUNTER
Received call from patient's cousin Kayley Crum, who has KUNAL permission. He was discharged from [address] [place] [address] on 5-16-23 s/p Acute STEMI, stent x 2. He has an appointment with his cardiologist Dr Rosalinda Hedrick on 5-30-23, and has home health RN,  PT, OT and speech starting this week. He is doing well today. Do you want pt to come in to office for hospital follow up? Pt's cousin is asking if it can be a VV.

## 2023-05-17 NOTE — TELEPHONE ENCOUNTER
Spoke to Cory Slade, it was recommended in discharge instructions to follow up with pcp and they will need home health orders. She understands VV is okay considering schedule availability. Transferred to  to schedule HFU apt.

## 2023-05-17 NOTE — TELEPHONE ENCOUNTER
Future Appointments   Date Time Provider Jagdeep Herron   5/23/2023  2:40 PM Annette Brown, [facility] [address] [facility] [address]

## 2023-05-18 ENCOUNTER — MED REC SCAN ONLY (OUTPATIENT)
Dept: INTERNAL MEDICINE CLINIC | Facility: CLINIC | Age: 68
End: 2023-05-18

## 2023-05-23 PROBLEM — Z95.5 S/P CORONARY ARTERY STENT PLACEMENT: Status: ACTIVE | Noted: 2023-05-23

## 2023-05-24 ENCOUNTER — PATIENT OUTREACH (OUTPATIENT)
Dept: CASE MANAGEMENT | Age: 68
End: 2023-05-24

## 2023-05-24 NOTE — PROGRESS NOTES
Called patient regarding CCM monthly and left a message for patient to call back when they can. Reviewed patient chart. Time Spent This Encounter Total: 3  min medical record review  Monthly Minute Total including today: 3 minutes    Chance Rodríguez, [address] [place] [address]  Phone: [phone]. Time [place] 3rd Floor

## 2023-06-04 ENCOUNTER — HOSPITAL ENCOUNTER (INPATIENT)
Age: 68
LOS: 5 days | Discharge: NURSING, REHAB OR LONG-TERM CARE | DRG: 070 | End: 2023-06-10
Attending: EMERGENCY MEDICINE | Admitting: INTERNAL MEDICINE

## 2023-06-04 ENCOUNTER — APPOINTMENT (OUTPATIENT)
Dept: GENERAL RADIOLOGY | Age: 68
DRG: 070 | End: 2023-06-04
Attending: EMERGENCY MEDICINE

## 2023-06-04 ENCOUNTER — APPOINTMENT (OUTPATIENT)
Dept: CT IMAGING | Age: 68
DRG: 070 | End: 2023-06-04
Attending: EMERGENCY MEDICINE

## 2023-06-04 DIAGNOSIS — R41.82 ALTERED MENTAL STATUS, UNSPECIFIED ALTERED MENTAL STATUS TYPE: Primary | ICD-10-CM

## 2023-06-04 LAB
ALBUMIN SERPL-MCNC: 3.3 G/DL (ref 3.6–5.1)
ALP SERPL-CCNC: 96 UNITS/L (ref 45–117)
ALT SERPL-CCNC: 28 UNITS/L
ANION GAP SERPL CALC-SCNC: 10 MMOL/L (ref 7–19)
APPEARANCE UR: CLEAR
AST SERPL-CCNC: 23 UNITS/L
BACTERIA #/AREA URNS HPF: NORMAL /HPF
BASOPHILS # BLD: 0 K/MCL (ref 0–0.3)
BASOPHILS NFR BLD: 1 %
BILIRUB CONJ SERPL-MCNC: 0.2 MG/DL (ref 0–0.2)
BILIRUB SERPL-MCNC: 0.7 MG/DL (ref 0.2–1)
BILIRUB UR QL STRIP: NEGATIVE
BUN SERPL-MCNC: 19 MG/DL (ref 6–20)
BUN/CREAT SERPL: 17 (ref 7–25)
CALCIUM SERPL-MCNC: 9 MG/DL (ref 8.4–10.2)
CHLORIDE SERPL-SCNC: 111 MMOL/L (ref 97–110)
CO2 SERPL-SCNC: 24 MMOL/L (ref 21–32)
COLOR UR: NORMAL
CREAT SERPL-MCNC: 1.1 MG/DL (ref 0.67–1.17)
DEPRECATED RDW RBC: 49.4 FL (ref 39–50)
EOSINOPHIL # BLD: 0.2 K/MCL (ref 0–0.5)
EOSINOPHIL NFR BLD: 3 %
ERYTHROCYTE [DISTWIDTH] IN BLOOD: 12.8 % (ref 11–15)
ETHANOL SERPL-MCNC: NORMAL MG/DL
FASTING DURATION TIME PATIENT: ABNORMAL H
GFR SERPLBLD BASED ON 1.73 SQ M-ARVRAT: 74 ML/MIN
GLUCOSE SERPL-MCNC: 145 MG/DL (ref 70–99)
GLUCOSE UR STRIP-MCNC: NEGATIVE MG/DL
HCT VFR BLD CALC: 41.4 % (ref 39–51)
HGB BLD-MCNC: 14 G/DL (ref 13–17)
HGB UR QL STRIP: NEGATIVE
HYALINE CASTS #/AREA URNS LPF: NORMAL /LPF
IMM GRANULOCYTES # BLD AUTO: 0 K/MCL (ref 0–0.2)
IMM GRANULOCYTES # BLD: 0 %
KETONES UR STRIP-MCNC: NEGATIVE MG/DL
LEUKOCYTE ESTERASE UR QL STRIP: NEGATIVE
LYMPHOCYTES # BLD: 1 K/MCL (ref 1–4)
LYMPHOCYTES NFR BLD: 16 %
MCH RBC QN AUTO: 35 PG (ref 26–34)
MCHC RBC AUTO-ENTMCNC: 33.8 G/DL (ref 32–36.5)
MCV RBC AUTO: 103.5 FL (ref 78–100)
MONOCYTES # BLD: 0.4 K/MCL (ref 0.3–0.9)
MONOCYTES NFR BLD: 7 %
MUCOUS THREADS URNS QL MICRO: PRESENT
NEUTROPHILS # BLD: 4.5 K/MCL (ref 1.8–7.7)
NEUTROPHILS NFR BLD: 73 %
NITRITE UR QL STRIP: NEGATIVE
NRBC BLD MANUAL-RTO: 0 /100 WBC
PH UR STRIP: 7 [PH] (ref 5–7)
PLATELET # BLD AUTO: 241 K/MCL (ref 140–450)
POTASSIUM SERPL-SCNC: 4 MMOL/L (ref 3.4–5.1)
PROT SERPL-MCNC: 6.5 G/DL (ref 6.4–8.2)
PROT UR STRIP-MCNC: NEGATIVE MG/DL
RBC # BLD: 4 MIL/MCL (ref 4.5–5.9)
RBC #/AREA URNS HPF: NORMAL /HPF
SODIUM SERPL-SCNC: 141 MMOL/L (ref 135–145)
SP GR UR STRIP: 1.02 (ref 1–1.03)
SQUAMOUS #/AREA URNS HPF: NORMAL /HPF
UROBILINOGEN UR STRIP-MCNC: 0.2 MG/DL
WBC # BLD: 6.2 K/MCL (ref 4.2–11)
WBC #/AREA URNS HPF: NORMAL /HPF

## 2023-06-04 PROCEDURE — G0378 HOSPITAL OBSERVATION PER HR: HCPCS

## 2023-06-04 PROCEDURE — 99285 EMERGENCY DEPT VISIT HI MDM: CPT

## 2023-06-04 PROCEDURE — 10002807 HB RX 258: Performed by: EMERGENCY MEDICINE

## 2023-06-04 PROCEDURE — 84443 ASSAY THYROID STIM HORMONE: CPT | Performed by: PSYCHIATRY & NEUROLOGY

## 2023-06-04 PROCEDURE — 81001 URINALYSIS AUTO W/SCOPE: CPT | Performed by: EMERGENCY MEDICINE

## 2023-06-04 PROCEDURE — 82746 ASSAY OF FOLIC ACID SERUM: CPT | Performed by: PSYCHIATRY & NEUROLOGY

## 2023-06-04 PROCEDURE — 85025 COMPLETE CBC W/AUTO DIFF WBC: CPT | Performed by: EMERGENCY MEDICINE

## 2023-06-04 PROCEDURE — G1004 CDSM NDSC: HCPCS

## 2023-06-04 PROCEDURE — 96360 HYDRATION IV INFUSION INIT: CPT

## 2023-06-04 PROCEDURE — 82077 ASSAY SPEC XCP UR&BREATH IA: CPT | Performed by: EMERGENCY MEDICINE

## 2023-06-04 PROCEDURE — 10002807 HB RX 258: Performed by: INTERNAL MEDICINE

## 2023-06-04 PROCEDURE — 80048 BASIC METABOLIC PNL TOTAL CA: CPT | Performed by: EMERGENCY MEDICINE

## 2023-06-04 PROCEDURE — 10002803 HB RX 637: Performed by: INTERNAL MEDICINE

## 2023-06-04 PROCEDURE — 71045 X-RAY EXAM CHEST 1 VIEW: CPT

## 2023-06-04 PROCEDURE — 80076 HEPATIC FUNCTION PANEL: CPT | Performed by: EMERGENCY MEDICINE

## 2023-06-04 PROCEDURE — 70450 CT HEAD/BRAIN W/O DYE: CPT

## 2023-06-04 RX ORDER — LANOLIN ALCOHOL/MO/W.PET/CERES
3 CREAM (GRAM) TOPICAL NIGHTLY PRN
Status: DISCONTINUED | OUTPATIENT
Start: 2023-06-04 | End: 2023-06-10 | Stop reason: HOSPADM

## 2023-06-04 RX ORDER — ACETAMINOPHEN 325 MG/1
650 TABLET ORAL EVERY 6 HOURS PRN
Status: DISCONTINUED | OUTPATIENT
Start: 2023-06-04 | End: 2023-06-10 | Stop reason: HOSPADM

## 2023-06-04 RX ORDER — SODIUM CHLORIDE 9 MG/ML
INJECTION, SOLUTION INTRAVENOUS CONTINUOUS
Status: DISCONTINUED | OUTPATIENT
Start: 2023-06-04 | End: 2023-06-07

## 2023-06-04 RX ORDER — METOPROLOL SUCCINATE 25 MG/1
25 TABLET, EXTENDED RELEASE ORAL AT BEDTIME
Status: DISCONTINUED | OUTPATIENT
Start: 2023-06-04 | End: 2023-06-10 | Stop reason: HOSPADM

## 2023-06-04 RX ORDER — MIRTAZAPINE 15 MG/1
15 TABLET, FILM COATED ORAL NIGHTLY
Status: DISCONTINUED | OUTPATIENT
Start: 2023-06-04 | End: 2023-06-10 | Stop reason: HOSPADM

## 2023-06-04 RX ORDER — TRAMADOL HYDROCHLORIDE 50 MG/1
50 TABLET ORAL 2 TIMES DAILY PRN
Status: DISCONTINUED | OUTPATIENT
Start: 2023-06-04 | End: 2023-06-05

## 2023-06-04 RX ADMIN — SODIUM CHLORIDE 500 ML: 9 INJECTION, SOLUTION INTRAVENOUS at 18:00

## 2023-06-04 RX ADMIN — MIRTAZAPINE 15 MG: 15 TABLET, FILM COATED ORAL at 21:36

## 2023-06-04 RX ADMIN — Medication 3 MG: at 22:45

## 2023-06-04 RX ADMIN — METOPROLOL SUCCINATE 25 MG: 25 TABLET, EXTENDED RELEASE ORAL at 21:36

## 2023-06-04 RX ADMIN — SODIUM CHLORIDE: 9 INJECTION, SOLUTION INTRAVENOUS at 21:41

## 2023-06-04 SDOH — HEALTH STABILITY: PHYSICAL HEALTH: DO YOU HAVE DIFFICULTY DRESSING OR BATHING?: NO

## 2023-06-04 SDOH — HEALTH STABILITY: PHYSICAL HEALTH: DO YOU HAVE SERIOUS DIFFICULTY WALKING OR CLIMBING STAIRS?: NO

## 2023-06-04 SDOH — ECONOMIC STABILITY: HOUSING INSECURITY: ARE YOU WORRIED ABOUT LOSING YOUR HOUSING?: NO

## 2023-06-04 SDOH — ECONOMIC STABILITY: FOOD INSECURITY: HOW OFTEN IN THE PAST 12 MONTHS WERE YOU WORRIED OR STRESSED ABOUT HAVING ENOUGH MONEY TO BUY NUTRITIOUS MEALS?: NEVER

## 2023-06-04 SDOH — SOCIAL STABILITY: SOCIAL NETWORK
HOW OFTEN DO YOU SEE OR TALK TO PEOPLE THAT YOU CARE ABOUT AND FEEL CLOSE TO? (FOR EXAMPLE: TALKING TO FRIENDS ON THE PHONE, VISITING FRIENDS OR FAMILY, GOING TO CHURCH OR CLUB MEETINGS): LESS THAN ONCE A WEEK

## 2023-06-04 SDOH — SOCIAL STABILITY: SOCIAL NETWORK: SUPPORT SYSTEMS: FAMILY MEMBERS

## 2023-06-04 SDOH — HEALTH STABILITY: GENERAL: BECAUSE OF A PHYSICAL, MENTAL, OR EMOTIONAL CONDITION, DO YOU HAVE DIFFICULTY DOING ERRANDS ALONE?: NO

## 2023-06-04 SDOH — ECONOMIC STABILITY: GENERAL

## 2023-06-04 SDOH — ECONOMIC STABILITY: HOUSING INSECURITY: WHAT IS YOUR LIVING SITUATION TODAY?: APARTMENT

## 2023-06-04 SDOH — ECONOMIC STABILITY: HOUSING INSECURITY: WHAT IS YOUR LIVING SITUATION TODAY?: FAMILY MEMBERS

## 2023-06-04 SDOH — HEALTH STABILITY: GENERAL
BECAUSE OF A PHYSICAL, MENTAL, OR EMOTIONAL CONDITION, DO YOU HAVE SERIOUS DIFFICULTY CONCENTRATING, REMEMBERING OR MAKING DECISIONS?: NO

## 2023-06-04 ASSESSMENT — ACTIVITIES OF DAILY LIVING (ADL)
RECENT_DECLINE_ADL: NO
ADL_SCORE: 12
ADL_SHORT_OF_BREATH: NO
ADL_BEFORE_ADMISSION: INDEPENDENT

## 2023-06-04 ASSESSMENT — PAIN SCALES - GENERAL
PAINLEVEL_OUTOF10: 0
PAINLEVEL_OUTOF10: 0

## 2023-06-04 ASSESSMENT — LIFESTYLE VARIABLES
HOW OFTEN DO YOU HAVE A DRINK CONTAINING ALCOHOL: NEVER
ALCOHOL_USE_STATUS: NO OR LOW RISK WITH VALIDATED TOOL
AUDIT-C TOTAL SCORE: 0
HOW OFTEN DO YOU HAVE 6 OR MORE DRINKS ON ONE OCCASION: NEVER
HOW MANY STANDARD DRINKS CONTAINING ALCOHOL DO YOU HAVE ON A TYPICAL DAY: 0,1 OR 2

## 2023-06-04 ASSESSMENT — COLUMBIA-SUICIDE SEVERITY RATING SCALE - C-SSRS
6. HAVE YOU EVER DONE ANYTHING, STARTED TO DO ANYTHING, OR PREPARED TO DO ANYTHING TO END YOUR LIFE?: NO
IS THE PATIENT ABLE TO COMPLETE C-SSRS: YES
2. HAVE YOU ACTUALLY HAD ANY THOUGHTS OF KILLING YOURSELF?: NO
1. WITHIN THE PAST MONTH, HAVE YOU WISHED YOU WERE DEAD OR WISHED YOU COULD GO TO SLEEP AND NOT WAKE UP?: NO

## 2023-06-04 ASSESSMENT — PATIENT HEALTH QUESTIONNAIRE - PHQ9
SUM OF ALL RESPONSES TO PHQ9 QUESTIONS 1 AND 2: 0
CLINICAL INTERPRETATION OF PHQ2 SCORE: NO FURTHER SCREENING NEEDED
SUM OF ALL RESPONSES TO PHQ9 QUESTIONS 1 AND 2: 0
1. LITTLE INTEREST OR PLEASURE IN DOING THINGS: NOT AT ALL
IS PATIENT ABLE TO COMPLETE PHQ2 OR PHQ9: YES
2. FEELING DOWN, DEPRESSED OR HOPELESS: NOT AT ALL

## 2023-06-05 ENCOUNTER — APPOINTMENT (OUTPATIENT)
Dept: NEUROLOGY | Age: 68
DRG: 070 | End: 2023-06-05
Attending: PSYCHIATRY & NEUROLOGY

## 2023-06-05 ENCOUNTER — TELEPHONE (OUTPATIENT)
Dept: INTERNAL MEDICINE CLINIC | Facility: CLINIC | Age: 68
End: 2023-06-05

## 2023-06-05 LAB
AMMONIA PLAS-SCNC: 29 MCMOL/L
ANION GAP SERPL CALC-SCNC: 8 MMOL/L (ref 7–19)
BUN SERPL-MCNC: 14 MG/DL (ref 6–20)
BUN/CREAT SERPL: 12 (ref 7–25)
CALCIUM SERPL-MCNC: 8.6 MG/DL (ref 8.4–10.2)
CHLORIDE SERPL-SCNC: 114 MMOL/L (ref 97–110)
CO2 SERPL-SCNC: 24 MMOL/L (ref 21–32)
CREAT SERPL-MCNC: 1.15 MG/DL (ref 0.67–1.17)
FASTING DURATION TIME PATIENT: ABNORMAL H
FOLATE SERPL-MCNC: 9 NG/ML
GFR SERPLBLD BASED ON 1.73 SQ M-ARVRAT: 70 ML/MIN
GLUCOSE SERPL-MCNC: 107 MG/DL (ref 70–99)
POTASSIUM SERPL-SCNC: 4.5 MMOL/L (ref 3.4–5.1)
RAINBOW EXTRA TUBES HOLD SPECIMEN: NORMAL
SODIUM SERPL-SCNC: 141 MMOL/L (ref 135–145)
TSH SERPL-ACNC: 4.97 MCUNITS/ML (ref 0.35–5)
VIT B12 SERPL-MCNC: 250 PG/ML (ref 211–911)

## 2023-06-05 PROCEDURE — G0378 HOSPITAL OBSERVATION PER HR: HCPCS

## 2023-06-05 PROCEDURE — 10006031 HB ROOM CHARGE TELEMETRY

## 2023-06-05 PROCEDURE — 95819 EEG AWAKE AND ASLEEP: CPT

## 2023-06-05 PROCEDURE — 10002807 HB RX 258: Performed by: INTERNAL MEDICINE

## 2023-06-05 PROCEDURE — 10002803 HB RX 637: Performed by: INTERNAL MEDICINE

## 2023-06-05 PROCEDURE — 10004651 HB RX, NO CHARGE ITEM: Performed by: INTERNAL MEDICINE

## 2023-06-05 PROCEDURE — 10002800 HB RX 250 W HCPCS: Performed by: PSYCHIATRY & NEUROLOGY

## 2023-06-05 PROCEDURE — 36415 COLL VENOUS BLD VENIPUNCTURE: CPT | Performed by: INTERNAL MEDICINE

## 2023-06-05 PROCEDURE — 10002800 HB RX 250 W HCPCS: Performed by: INTERNAL MEDICINE

## 2023-06-05 PROCEDURE — 80048 BASIC METABOLIC PNL TOTAL CA: CPT | Performed by: INTERNAL MEDICINE

## 2023-06-05 PROCEDURE — 90792 PSYCH DIAG EVAL W/MED SRVCS: CPT | Performed by: PSYCHIATRY & NEUROLOGY

## 2023-06-05 PROCEDURE — 10002803 HB RX 637: Performed by: PSYCHIATRY & NEUROLOGY

## 2023-06-05 PROCEDURE — 96372 THER/PROPH/DIAG INJ SC/IM: CPT | Performed by: INTERNAL MEDICINE

## 2023-06-05 PROCEDURE — 82140 ASSAY OF AMMONIA: CPT | Performed by: PSYCHIATRY & NEUROLOGY

## 2023-06-05 RX ORDER — CLOPIDOGREL BISULFATE 75 MG/1
75 TABLET ORAL DAILY
Status: DISCONTINUED | OUTPATIENT
Start: 2023-06-05 | End: 2023-06-10 | Stop reason: HOSPADM

## 2023-06-05 RX ORDER — LORAZEPAM 2 MG/ML
1 INJECTION INTRAMUSCULAR
Status: DISCONTINUED | OUTPATIENT
Start: 2023-06-05 | End: 2023-06-10 | Stop reason: HOSPADM

## 2023-06-05 RX ORDER — ASPIRIN 81 MG/1
81 TABLET, CHEWABLE ORAL DAILY
Status: DISCONTINUED | OUTPATIENT
Start: 2023-06-05 | End: 2023-06-10 | Stop reason: HOSPADM

## 2023-06-05 RX ORDER — TAMSULOSIN HYDROCHLORIDE 0.4 MG/1
0.4 CAPSULE ORAL DAILY
Status: DISCONTINUED | OUTPATIENT
Start: 2023-06-05 | End: 2023-06-10 | Stop reason: HOSPADM

## 2023-06-05 RX ORDER — HEPARIN SODIUM 5000 [USP'U]/ML
5000 INJECTION, SOLUTION INTRAVENOUS; SUBCUTANEOUS EVERY 8 HOURS SCHEDULED
Status: DISCONTINUED | OUTPATIENT
Start: 2023-06-05 | End: 2023-06-10 | Stop reason: HOSPADM

## 2023-06-05 RX ORDER — ATORVASTATIN CALCIUM 80 MG/1
80 TABLET, FILM COATED ORAL DAILY
Status: DISCONTINUED | OUTPATIENT
Start: 2023-06-05 | End: 2023-06-10 | Stop reason: HOSPADM

## 2023-06-05 RX ORDER — ROPINIROLE 1 MG/1
1 TABLET, FILM COATED ORAL 2 TIMES DAILY
Status: DISCONTINUED | OUTPATIENT
Start: 2023-06-05 | End: 2023-06-10 | Stop reason: HOSPADM

## 2023-06-05 RX ORDER — HALOPERIDOL 5 MG/ML
2 INJECTION INTRAMUSCULAR EVERY 6 HOURS PRN
Status: DISCONTINUED | OUTPATIENT
Start: 2023-06-05 | End: 2023-06-10 | Stop reason: HOSPADM

## 2023-06-05 RX ORDER — QUETIAPINE FUMARATE 25 MG/1
25 TABLET, FILM COATED ORAL 2 TIMES DAILY PRN
Status: DISCONTINUED | OUTPATIENT
Start: 2023-06-05 | End: 2023-06-07

## 2023-06-05 RX ADMIN — HEPARIN SODIUM 5000 UNITS: 5000 INJECTION INTRAVENOUS; SUBCUTANEOUS at 21:14

## 2023-06-05 RX ADMIN — ASPIRIN 81 MG CHEWABLE TABLET 81 MG: 81 TABLET CHEWABLE at 08:00

## 2023-06-05 RX ADMIN — CLOPIDOGREL BISULFATE 75 MG: 75 TABLET, FILM COATED ORAL at 07:59

## 2023-06-05 RX ADMIN — SODIUM CHLORIDE: 9 INJECTION, SOLUTION INTRAVENOUS at 08:03

## 2023-06-05 RX ADMIN — ROPINIROLE HYDROCHLORIDE 1 MG: 1 TABLET, FILM COATED ORAL at 21:13

## 2023-06-05 RX ADMIN — ROPINIROLE HYDROCHLORIDE 1 MG: 1 TABLET, FILM COATED ORAL at 07:59

## 2023-06-05 RX ADMIN — QUETIAPINE FUMARATE 25 MG: 25 TABLET, FILM COATED ORAL at 13:49

## 2023-06-05 RX ADMIN — SODIUM CHLORIDE: 9 INJECTION, SOLUTION INTRAVENOUS at 21:19

## 2023-06-05 RX ADMIN — TAMSULOSIN HYDROCHLORIDE 0.4 MG: 0.4 CAPSULE ORAL at 07:59

## 2023-06-05 RX ADMIN — ROPINIROLE HYDROCHLORIDE 1 MG: 1 TABLET, FILM COATED ORAL at 00:40

## 2023-06-05 RX ADMIN — HALOPERIDOL LACTATE 2 MG: 5 INJECTION, SOLUTION INTRAMUSCULAR at 18:37

## 2023-06-05 RX ADMIN — ATORVASTATIN CALCIUM 80 MG: 80 TABLET, FILM COATED ORAL at 07:59

## 2023-06-05 RX ADMIN — MIRTAZAPINE 15 MG: 15 TABLET, FILM COATED ORAL at 21:13

## 2023-06-05 RX ADMIN — METOPROLOL SUCCINATE 25 MG: 25 TABLET, EXTENDED RELEASE ORAL at 21:13

## 2023-06-05 RX ADMIN — HEPARIN SODIUM 5000 UNITS: 5000 INJECTION INTRAVENOUS; SUBCUTANEOUS at 06:42

## 2023-06-05 ASSESSMENT — PAIN SCALES - GENERAL
PAINLEVEL_OUTOF10: 0
PAINLEVEL_OUTOF10: 0

## 2023-06-05 ASSESSMENT — ENCOUNTER SYMPTOMS
CONSTITUTIONAL NEGATIVE: 1
RESPIRATORY NEGATIVE: 1
CONFUSION: 1
GASTROINTESTINAL NEGATIVE: 1

## 2023-06-05 ASSESSMENT — COGNITIVE AND FUNCTIONAL STATUS - GENERAL
BECAUSE OF A PHYSICAL, MENTAL, OR EMOTIONAL CONDITION, DO YOU HAVE SERIOUS DIFFICULTY CONCENTRATING, REMEMBERING OR MAKING DECISIONS: YES
DO YOU HAVE DIFFICULTY DRESSING OR BATHING: NO
DO YOU HAVE SERIOUS DIFFICULTY WALKING OR CLIMBING STAIRS: YES
BECAUSE OF A PHYSICAL, MENTAL, OR EMOTIONAL CONDITION, DO YOU HAVE DIFFICULTY DOING ERRANDS ALONE: YES

## 2023-06-05 NOTE — TELEPHONE ENCOUNTER
Care Everywhere Records Received    Updated Care Everywhere: yes    Date of Service: 6/4/23    From What Facility: [facility]    Speciality: ED    Type of Record: consult    Document Placed:steven espinosa

## 2023-06-06 ENCOUNTER — TELEPHONE (OUTPATIENT)
Dept: INTERNAL MEDICINE CLINIC | Facility: CLINIC | Age: 68
End: 2023-06-06

## 2023-06-06 ENCOUNTER — APPOINTMENT (OUTPATIENT)
Dept: MRI IMAGING | Age: 68
DRG: 070 | End: 2023-06-06
Attending: PSYCHIATRY & NEUROLOGY

## 2023-06-06 LAB
DEPRECATED RDW RBC: 49.1 FL (ref 39–50)
ERYTHROCYTE [DISTWIDTH] IN BLOOD: 12.6 % (ref 11–15)
HCT VFR BLD CALC: 39.9 % (ref 39–51)
HGB BLD-MCNC: 13.2 G/DL (ref 13–17)
MCH RBC QN AUTO: 34.7 PG (ref 26–34)
MCHC RBC AUTO-ENTMCNC: 33.1 G/DL (ref 32–36.5)
MCV RBC AUTO: 105 FL (ref 78–100)
NRBC BLD MANUAL-RTO: 0 /100 WBC
PLATELET # BLD AUTO: 184 K/MCL (ref 140–450)
RBC # BLD: 3.8 MIL/MCL (ref 4.5–5.9)
WBC # BLD: 4.6 K/MCL (ref 4.2–11)

## 2023-06-06 PROCEDURE — 10002803 HB RX 637: Performed by: INTERNAL MEDICINE

## 2023-06-06 PROCEDURE — 10002800 HB RX 250 W HCPCS: Performed by: INTERNAL MEDICINE

## 2023-06-06 PROCEDURE — 85027 COMPLETE CBC AUTOMATED: CPT | Performed by: INTERNAL MEDICINE

## 2023-06-06 PROCEDURE — 96372 THER/PROPH/DIAG INJ SC/IM: CPT | Performed by: INTERNAL MEDICINE

## 2023-06-06 PROCEDURE — 36415 COLL VENOUS BLD VENIPUNCTURE: CPT | Performed by: INTERNAL MEDICINE

## 2023-06-06 PROCEDURE — 10006031 HB ROOM CHARGE TELEMETRY

## 2023-06-06 PROCEDURE — 10002807 HB RX 258: Performed by: INTERNAL MEDICINE

## 2023-06-06 PROCEDURE — 10002800 HB RX 250 W HCPCS: Performed by: PSYCHIATRY & NEUROLOGY

## 2023-06-06 PROCEDURE — G1004 CDSM NDSC: HCPCS

## 2023-06-06 PROCEDURE — A9585 GADOBUTROL INJECTION: HCPCS | Performed by: PSYCHIATRY & NEUROLOGY

## 2023-06-06 PROCEDURE — 10002805 HB CONTRAST AGENT: Performed by: PSYCHIATRY & NEUROLOGY

## 2023-06-06 PROCEDURE — 10004651 HB RX, NO CHARGE ITEM: Performed by: INTERNAL MEDICINE

## 2023-06-06 PROCEDURE — 70553 MRI BRAIN STEM W/O & W/DYE: CPT

## 2023-06-06 RX ORDER — GADOBUTROL 604.72 MG/ML
10 INJECTION INTRAVENOUS ONCE
Status: COMPLETED | OUTPATIENT
Start: 2023-06-06 | End: 2023-06-06

## 2023-06-06 RX ADMIN — MIRTAZAPINE 15 MG: 15 TABLET, FILM COATED ORAL at 21:50

## 2023-06-06 RX ADMIN — HEPARIN SODIUM 5000 UNITS: 5000 INJECTION INTRAVENOUS; SUBCUTANEOUS at 21:50

## 2023-06-06 RX ADMIN — ASPIRIN 81 MG CHEWABLE TABLET 81 MG: 81 TABLET CHEWABLE at 08:04

## 2023-06-06 RX ADMIN — ATORVASTATIN CALCIUM 80 MG: 80 TABLET, FILM COATED ORAL at 08:04

## 2023-06-06 RX ADMIN — SODIUM CHLORIDE: 9 INJECTION, SOLUTION INTRAVENOUS at 08:04

## 2023-06-06 RX ADMIN — GADOBUTROL 10 ML: 604.72 INJECTION INTRAVENOUS at 13:13

## 2023-06-06 RX ADMIN — TAMSULOSIN HYDROCHLORIDE 0.4 MG: 0.4 CAPSULE ORAL at 08:04

## 2023-06-06 RX ADMIN — HALOPERIDOL LACTATE 2 MG: 5 INJECTION, SOLUTION INTRAMUSCULAR at 07:10

## 2023-06-06 RX ADMIN — HEPARIN SODIUM 5000 UNITS: 5000 INJECTION INTRAVENOUS; SUBCUTANEOUS at 14:25

## 2023-06-06 RX ADMIN — ROPINIROLE HYDROCHLORIDE 1 MG: 1 TABLET, FILM COATED ORAL at 21:50

## 2023-06-06 RX ADMIN — SODIUM CHLORIDE: 9 INJECTION, SOLUTION INTRAVENOUS at 21:56

## 2023-06-06 RX ADMIN — SODIUM CHLORIDE: 9 INJECTION, SOLUTION INTRAVENOUS at 13:44

## 2023-06-06 RX ADMIN — METOPROLOL SUCCINATE 25 MG: 25 TABLET, EXTENDED RELEASE ORAL at 21:50

## 2023-06-06 RX ADMIN — ROPINIROLE HYDROCHLORIDE 1 MG: 1 TABLET, FILM COATED ORAL at 08:04

## 2023-06-06 RX ADMIN — HEPARIN SODIUM 5000 UNITS: 5000 INJECTION INTRAVENOUS; SUBCUTANEOUS at 05:50

## 2023-06-06 RX ADMIN — CLOPIDOGREL BISULFATE 75 MG: 75 TABLET, FILM COATED ORAL at 08:04

## 2023-06-06 ASSESSMENT — PAIN SCALES - GENERAL
PAINLEVEL_OUTOF10: 0
PAINLEVEL_OUTOF10: 0

## 2023-06-06 NOTE — TELEPHONE ENCOUNTER
Incoming (mail or fax):   Fax  Received from:  [facility]   Documentation given to:  Triage incoming    poc needs signature

## 2023-06-07 ENCOUNTER — PATIENT OUTREACH (OUTPATIENT)
Dept: CASE MANAGEMENT | Age: 68
End: 2023-06-07

## 2023-06-07 LAB
ANION GAP SERPL CALC-SCNC: 7 MMOL/L (ref 7–19)
BUN SERPL-MCNC: 8 MG/DL (ref 6–20)
BUN/CREAT SERPL: 7 (ref 7–25)
CALCIUM SERPL-MCNC: 8.7 MG/DL (ref 8.4–10.2)
CHLORIDE SERPL-SCNC: 117 MMOL/L (ref 97–110)
CO2 SERPL-SCNC: 23 MMOL/L (ref 21–32)
CREAT SERPL-MCNC: 1.16 MG/DL (ref 0.67–1.17)
DEPRECATED RDW RBC: 49.5 FL (ref 39–50)
ERYTHROCYTE [DISTWIDTH] IN BLOOD: 12.7 % (ref 11–15)
FASTING DURATION TIME PATIENT: ABNORMAL H
GFR SERPLBLD BASED ON 1.73 SQ M-ARVRAT: 69 ML/MIN
GLUCOSE SERPL-MCNC: 105 MG/DL (ref 70–99)
HCT VFR BLD CALC: 42.2 % (ref 39–51)
HGB BLD-MCNC: 13.9 G/DL (ref 13–17)
MCH RBC QN AUTO: 34.8 PG (ref 26–34)
MCHC RBC AUTO-ENTMCNC: 32.9 G/DL (ref 32–36.5)
MCV RBC AUTO: 105.8 FL (ref 78–100)
NRBC BLD MANUAL-RTO: 0 /100 WBC
PLATELET # BLD AUTO: 197 K/MCL (ref 140–450)
POTASSIUM SERPL-SCNC: 4.4 MMOL/L (ref 3.4–5.1)
RBC # BLD: 3.99 MIL/MCL (ref 4.5–5.9)
SODIUM SERPL-SCNC: 143 MMOL/L (ref 135–145)
WBC # BLD: 5.3 K/MCL (ref 4.2–11)

## 2023-06-07 PROCEDURE — 10004651 HB RX, NO CHARGE ITEM: Performed by: INTERNAL MEDICINE

## 2023-06-07 PROCEDURE — 10002800 HB RX 250 W HCPCS: Performed by: PSYCHIATRY & NEUROLOGY

## 2023-06-07 PROCEDURE — 36415 COLL VENOUS BLD VENIPUNCTURE: CPT | Performed by: INTERNAL MEDICINE

## 2023-06-07 PROCEDURE — 10002800 HB RX 250 W HCPCS: Performed by: INTERNAL MEDICINE

## 2023-06-07 PROCEDURE — 10002803 HB RX 637: Performed by: INTERNAL MEDICINE

## 2023-06-07 PROCEDURE — 10002803 HB RX 637: Performed by: PSYCHIATRY & NEUROLOGY

## 2023-06-07 PROCEDURE — 99232 SBSQ HOSP IP/OBS MODERATE 35: CPT | Performed by: PSYCHIATRY & NEUROLOGY

## 2023-06-07 PROCEDURE — 96372 THER/PROPH/DIAG INJ SC/IM: CPT | Performed by: INTERNAL MEDICINE

## 2023-06-07 PROCEDURE — 80048 BASIC METABOLIC PNL TOTAL CA: CPT | Performed by: INTERNAL MEDICINE

## 2023-06-07 PROCEDURE — 10006031 HB ROOM CHARGE TELEMETRY

## 2023-06-07 PROCEDURE — 85027 COMPLETE CBC AUTOMATED: CPT | Performed by: INTERNAL MEDICINE

## 2023-06-07 RX ORDER — QUETIAPINE FUMARATE 25 MG/1
25 TABLET, FILM COATED ORAL 2 TIMES DAILY
Status: DISCONTINUED | OUTPATIENT
Start: 2023-06-07 | End: 2023-06-10 | Stop reason: HOSPADM

## 2023-06-07 RX ADMIN — Medication 3 MG: at 00:52

## 2023-06-07 RX ADMIN — ASPIRIN 81 MG CHEWABLE TABLET 81 MG: 81 TABLET CHEWABLE at 08:04

## 2023-06-07 RX ADMIN — TAMSULOSIN HYDROCHLORIDE 0.4 MG: 0.4 CAPSULE ORAL at 08:03

## 2023-06-07 RX ADMIN — Medication 3 MG: at 21:41

## 2023-06-07 RX ADMIN — MIRTAZAPINE 15 MG: 15 TABLET, FILM COATED ORAL at 21:41

## 2023-06-07 RX ADMIN — HALOPERIDOL LACTATE 2 MG: 5 INJECTION, SOLUTION INTRAMUSCULAR at 05:35

## 2023-06-07 RX ADMIN — ROPINIROLE HYDROCHLORIDE 1 MG: 1 TABLET, FILM COATED ORAL at 21:41

## 2023-06-07 RX ADMIN — METOPROLOL SUCCINATE 25 MG: 25 TABLET, EXTENDED RELEASE ORAL at 21:41

## 2023-06-07 RX ADMIN — QUETIAPINE FUMARATE 25 MG: 25 TABLET, FILM COATED ORAL at 21:41

## 2023-06-07 RX ADMIN — ACETAMINOPHEN 650 MG: 325 TABLET ORAL at 21:45

## 2023-06-07 RX ADMIN — HEPARIN SODIUM 5000 UNITS: 5000 INJECTION INTRAVENOUS; SUBCUTANEOUS at 15:21

## 2023-06-07 RX ADMIN — HEPARIN SODIUM 5000 UNITS: 5000 INJECTION INTRAVENOUS; SUBCUTANEOUS at 21:42

## 2023-06-07 RX ADMIN — ATORVASTATIN CALCIUM 80 MG: 80 TABLET, FILM COATED ORAL at 08:03

## 2023-06-07 RX ADMIN — QUETIAPINE FUMARATE 25 MG: 25 TABLET, FILM COATED ORAL at 00:52

## 2023-06-07 RX ADMIN — HEPARIN SODIUM 5000 UNITS: 5000 INJECTION INTRAVENOUS; SUBCUTANEOUS at 05:21

## 2023-06-07 RX ADMIN — CLOPIDOGREL BISULFATE 75 MG: 75 TABLET, FILM COATED ORAL at 08:04

## 2023-06-07 RX ADMIN — ROPINIROLE HYDROCHLORIDE 1 MG: 1 TABLET, FILM COATED ORAL at 08:03

## 2023-06-07 ASSESSMENT — PAIN SCALES - GENERAL
PAINLEVEL_OUTOF10: 4
PAINLEVEL_OUTOF10: 0

## 2023-06-07 NOTE — PROGRESS NOTES
Allison Pizarro called and left me a message to call her back she had few questions. I tried calling Nahidian Juarez back and left a detailed message to call me back. Angela Colorado, [address] [place] [address], 3rd Floor [address] [facility], [address] [place] [address]  Phone: [phone]. Time [place] 3rd [facility]

## 2023-06-07 NOTE — TELEPHONE ENCOUNTER
Talked to Dawson Jo (cousin on HIPPA) and Pt is still admitted in the hospital. Dawson Jo will call to make HFU appt as soon as Pt is discharged.  Fax placed in triage room

## 2023-06-08 ENCOUNTER — TELEPHONE (OUTPATIENT)
Dept: INTERNAL MEDICINE CLINIC | Facility: CLINIC | Age: 68
End: 2023-06-08

## 2023-06-08 PROCEDURE — 10004651 HB RX, NO CHARGE ITEM: Performed by: INTERNAL MEDICINE

## 2023-06-08 PROCEDURE — 10006031 HB ROOM CHARGE TELEMETRY

## 2023-06-08 PROCEDURE — 10002803 HB RX 637: Performed by: PSYCHIATRY & NEUROLOGY

## 2023-06-08 PROCEDURE — 96372 THER/PROPH/DIAG INJ SC/IM: CPT | Performed by: INTERNAL MEDICINE

## 2023-06-08 PROCEDURE — 10002800 HB RX 250 W HCPCS: Performed by: INTERNAL MEDICINE

## 2023-06-08 PROCEDURE — 10002803 HB RX 637: Performed by: INTERNAL MEDICINE

## 2023-06-08 RX ORDER — HALOPERIDOL 5 MG/ML
2 INJECTION INTRAMUSCULAR ONCE
Status: DISCONTINUED | OUTPATIENT
Start: 2023-06-08 | End: 2023-06-10 | Stop reason: HOSPADM

## 2023-06-08 RX ORDER — HALOPERIDOL DECANOATE 50 MG/ML
2 INJECTION INTRAMUSCULAR ONCE
Status: DISCONTINUED | OUTPATIENT
Start: 2023-06-08 | End: 2023-06-08

## 2023-06-08 RX ADMIN — QUETIAPINE FUMARATE 25 MG: 25 TABLET, FILM COATED ORAL at 21:00

## 2023-06-08 RX ADMIN — ACETAMINOPHEN 650 MG: 325 TABLET ORAL at 21:06

## 2023-06-08 RX ADMIN — QUETIAPINE FUMARATE 25 MG: 25 TABLET, FILM COATED ORAL at 08:52

## 2023-06-08 RX ADMIN — ASPIRIN 81 MG CHEWABLE TABLET 81 MG: 81 TABLET CHEWABLE at 08:52

## 2023-06-08 RX ADMIN — METOPROLOL SUCCINATE 25 MG: 25 TABLET, EXTENDED RELEASE ORAL at 21:00

## 2023-06-08 RX ADMIN — TAMSULOSIN HYDROCHLORIDE 0.4 MG: 0.4 CAPSULE ORAL at 08:52

## 2023-06-08 RX ADMIN — ROPINIROLE HYDROCHLORIDE 1 MG: 1 TABLET, FILM COATED ORAL at 08:52

## 2023-06-08 RX ADMIN — MIRTAZAPINE 15 MG: 15 TABLET, FILM COATED ORAL at 21:00

## 2023-06-08 RX ADMIN — HEPARIN SODIUM 5000 UNITS: 5000 INJECTION INTRAVENOUS; SUBCUTANEOUS at 21:00

## 2023-06-08 RX ADMIN — ROPINIROLE HYDROCHLORIDE 1 MG: 1 TABLET, FILM COATED ORAL at 21:00

## 2023-06-08 RX ADMIN — Medication 3 MG: at 21:00

## 2023-06-08 RX ADMIN — CLOPIDOGREL BISULFATE 75 MG: 75 TABLET, FILM COATED ORAL at 08:52

## 2023-06-08 RX ADMIN — ATORVASTATIN CALCIUM 80 MG: 80 TABLET, FILM COATED ORAL at 08:52

## 2023-06-08 ASSESSMENT — PAIN SCALES - GENERAL
PAINLEVEL_OUTOF10: 0
PAINLEVEL_OUTOF10: 0
PAINLEVEL_OUTOF10: 3

## 2023-06-08 NOTE — TELEPHONE ENCOUNTER
Vicci Fruit for medication adherence consult (okay per HIPAA). Patient overdue for refill on atorvastatin per insurance report. David Leyden states that she helps patient with his medication. She states that she tried to get a refill on the atorvastatin but there were no remaining refills and was unsuccessful at contacting the patient's cardiologist. She reports that the patient never was without the medication as he had a stockpile of medication from previous. She reports that patient is currently in the hospital and will make sure he has a prescription at discharge for atorvastatin. Provided education on importance of adherence for cardiovascular secondary prevention. David Leyden denies any questions or concerns with medications at this time.

## 2023-06-09 PROCEDURE — 10004651 HB RX, NO CHARGE ITEM: Performed by: INTERNAL MEDICINE

## 2023-06-09 PROCEDURE — 96372 THER/PROPH/DIAG INJ SC/IM: CPT | Performed by: INTERNAL MEDICINE

## 2023-06-09 PROCEDURE — 10002803 HB RX 637: Performed by: INTERNAL MEDICINE

## 2023-06-09 PROCEDURE — 10002800 HB RX 250 W HCPCS: Performed by: INTERNAL MEDICINE

## 2023-06-09 PROCEDURE — 10002803 HB RX 637: Performed by: PSYCHIATRY & NEUROLOGY

## 2023-06-09 PROCEDURE — 10006031 HB ROOM CHARGE TELEMETRY

## 2023-06-09 RX ADMIN — MIRTAZAPINE 15 MG: 15 TABLET, FILM COATED ORAL at 21:16

## 2023-06-09 RX ADMIN — CLOPIDOGREL BISULFATE 75 MG: 75 TABLET, FILM COATED ORAL at 09:25

## 2023-06-09 RX ADMIN — QUETIAPINE FUMARATE 25 MG: 25 TABLET, FILM COATED ORAL at 09:24

## 2023-06-09 RX ADMIN — Medication 3 MG: at 23:01

## 2023-06-09 RX ADMIN — HEPARIN SODIUM 5000 UNITS: 5000 INJECTION INTRAVENOUS; SUBCUTANEOUS at 15:32

## 2023-06-09 RX ADMIN — ACETAMINOPHEN 650 MG: 325 TABLET ORAL at 23:01

## 2023-06-09 RX ADMIN — QUETIAPINE FUMARATE 25 MG: 25 TABLET, FILM COATED ORAL at 21:16

## 2023-06-09 RX ADMIN — TAMSULOSIN HYDROCHLORIDE 0.4 MG: 0.4 CAPSULE ORAL at 09:24

## 2023-06-09 RX ADMIN — ROPINIROLE HYDROCHLORIDE 1 MG: 1 TABLET, FILM COATED ORAL at 09:25

## 2023-06-09 RX ADMIN — ROPINIROLE HYDROCHLORIDE 1 MG: 1 TABLET, FILM COATED ORAL at 21:16

## 2023-06-09 RX ADMIN — HEPARIN SODIUM 5000 UNITS: 5000 INJECTION INTRAVENOUS; SUBCUTANEOUS at 21:16

## 2023-06-09 RX ADMIN — METOPROLOL SUCCINATE 25 MG: 25 TABLET, EXTENDED RELEASE ORAL at 21:16

## 2023-06-09 RX ADMIN — ASPIRIN 81 MG CHEWABLE TABLET 81 MG: 81 TABLET CHEWABLE at 09:24

## 2023-06-09 RX ADMIN — ATORVASTATIN CALCIUM 80 MG: 80 TABLET, FILM COATED ORAL at 09:24

## 2023-06-09 ASSESSMENT — PAIN SCALES - PAIN ASSESSMENT IN ADVANCED DEMENTIA (PAINAD)
BODYLANGUAGE: TENSE, DISTRESSED, FIDGETING
CONSOLABILITY: DISTRACTED OR REASSURED BY VOICE OR TOUCH
FACIALEXPRESSION: SAD. FRIGHTENED. FROWNING
BREATHING: NORMAL
TOTALSCORE: 4
NEGVOCALIZATION: OCCASIONAL MOAN OR GROAN, LOW LEVELS OF SPEECH WITH A NEGATIVE OR DISAPPROVING QUALITY

## 2023-06-09 ASSESSMENT — PAIN SCALES - GENERAL
PAINLEVEL_OUTOF10: 0
PAINLEVEL_OUTOF10: 0

## 2023-06-10 VITALS
SYSTOLIC BLOOD PRESSURE: 105 MMHG | TEMPERATURE: 97.3 F | DIASTOLIC BLOOD PRESSURE: 70 MMHG | RESPIRATION RATE: 20 BRPM | OXYGEN SATURATION: 93 % | HEART RATE: 75 BPM | BODY MASS INDEX: 32.92 KG/M2 | HEIGHT: 70 IN | WEIGHT: 229.94 LBS

## 2023-06-10 PROCEDURE — 10004651 HB RX, NO CHARGE ITEM: Performed by: INTERNAL MEDICINE

## 2023-06-10 PROCEDURE — 10002803 HB RX 637: Performed by: PSYCHIATRY & NEUROLOGY

## 2023-06-10 PROCEDURE — 10002803 HB RX 637: Performed by: INTERNAL MEDICINE

## 2023-06-10 RX ORDER — QUETIAPINE FUMARATE 25 MG/1
25 TABLET, FILM COATED ORAL 2 TIMES DAILY
Qty: 20 TABLET | Refills: 0 | Status: SHIPPED | OUTPATIENT
Start: 2023-06-10

## 2023-06-10 RX ADMIN — CLOPIDOGREL BISULFATE 75 MG: 75 TABLET, FILM COATED ORAL at 09:17

## 2023-06-10 RX ADMIN — TAMSULOSIN HYDROCHLORIDE 0.4 MG: 0.4 CAPSULE ORAL at 09:17

## 2023-06-10 RX ADMIN — ROPINIROLE HYDROCHLORIDE 1 MG: 1 TABLET, FILM COATED ORAL at 09:17

## 2023-06-10 RX ADMIN — QUETIAPINE FUMARATE 25 MG: 25 TABLET, FILM COATED ORAL at 09:17

## 2023-06-10 RX ADMIN — ATORVASTATIN CALCIUM 80 MG: 80 TABLET, FILM COATED ORAL at 09:17

## 2023-06-10 RX ADMIN — ASPIRIN 81 MG CHEWABLE TABLET 81 MG: 81 TABLET CHEWABLE at 09:17

## 2023-06-10 ASSESSMENT — PAIN SCALES - PAIN ASSESSMENT IN ADVANCED DEMENTIA (PAINAD)
BREATHING: NORMAL
BODYLANGUAGE: RELAXED
TOTALSCORE: 0
CONSOLABILITY: NO NEED TO CONSOLE
BODYLANGUAGE: RELAXED
FACIALEXPRESSION: SMILING OR INEXPRESSIVE
FACIALEXPRESSION: SMILING OR INEXPRESSIVE
CONSOLABILITY: NO NEED TO CONSOLE
TOTALSCORE: 0
BREATHING: NORMAL
BREATHING: NORMAL

## 2023-06-17 ENCOUNTER — TELEPHONE (OUTPATIENT)
Dept: INTERNAL MEDICINE CLINIC | Facility: CLINIC | Age: 68
End: 2023-06-17

## 2023-06-17 NOTE — TELEPHONE ENCOUNTER
Care Everywhere Records Received    Updated Care Everywhere: yes    Date of Service:  6/10/23    From What Facility: [facility]    Speciality: Hospital    Type of Record: Discharge Summary Note    Document Placed:  Janice's incoming folder

## 2023-06-19 ENCOUNTER — TELEPHONE (OUTPATIENT)
Dept: INTERNAL MEDICINE CLINIC | Facility: CLINIC | Age: 68
End: 2023-06-19

## 2023-06-19 NOTE — TELEPHONE ENCOUNTER
Incoming (mail or fax):  fax  Received from:  [facility]  Documentation given to:  Triage incoming    Plan of care needs signature

## 2023-06-22 ENCOUNTER — TELEPHONE (OUTPATIENT)
Dept: INTERNAL MEDICINE CLINIC | Facility: CLINIC | Age: 68
End: 2023-06-22

## 2023-07-10 ENCOUNTER — MED REC SCAN ONLY (OUTPATIENT)
Dept: INTERNAL MEDICINE CLINIC | Facility: CLINIC | Age: 68
End: 2023-07-10

## 2023-08-01 ENCOUNTER — E-ADVICE (OUTPATIENT)
Dept: CARDIOLOGY | Age: 68
End: 2023-08-01

## 2023-08-31 ENCOUNTER — PATIENT OUTREACH (OUTPATIENT)
Dept: CASE MANAGEMENT | Age: 68
End: 2023-08-31

## 2023-09-26 ENCOUNTER — TELEPHONE (OUTPATIENT)
Dept: INTERNAL MEDICINE CLINIC | Facility: CLINIC | Age: 68
End: 2023-09-26

## 2023-09-26 NOTE — TELEPHONE ENCOUNTER
[facility] called to follow up on signature for home health orders     Call back number [phone]

## 2023-09-27 NOTE — TELEPHONE ENCOUNTER
Pt has not been seen since hospital discharge. Please call to schedule f/u if discharged from snf, thank you! Orders in triage complete to hold.

## 2023-09-28 NOTE — TELEPHONE ENCOUNTER
Incoming (mail or fax):  fax  Received from:  [facility]  Documentation given to:  Janice's incoming folder    SN certification and plan of care from 5/20/23-7/18/23

## 2023-10-03 ENCOUNTER — TELEPHONE (OUTPATIENT)
Dept: INTERNAL MEDICINE CLINIC | Facility: CLINIC | Age: 68
End: 2023-10-03

## 2023-10-03 NOTE — TELEPHONE ENCOUNTER
Incoming (mail or fax):  fax  Received from:  [facility]  Documentation given to:  Janice's incoming folder    Home Health Recertification

## 2023-10-05 ENCOUNTER — TELEPHONE (OUTPATIENT)
Dept: INTERNAL MEDICINE CLINIC | Facility: CLINIC | Age: 68
End: 2023-10-05

## 2023-10-05 NOTE — TELEPHONE ENCOUNTER
Incoming (mail or fax):  fax  Received from:  [facility]  Documentation given to:  Janice Vallecillo for PT, OT, SN, SLP

## 2023-10-16 ENCOUNTER — PATIENT OUTREACH (OUTPATIENT)
Dept: CASE MANAGEMENT | Age: 68
End: 2023-10-16

## 2023-10-16 NOTE — PROGRESS NOTES
Called Shikha regarding CCM monthly and left a message for patient to call back when they can. Reviewed patient chart. Left contact my contact number [phone]. Time Spent This Encounter Total: 3  min medical record review  Monthly Minute Total including today: 3 minutes    Braulio Hodge, [address] [place] [address]  Phone: [phone]. Time Jo 3rd Floor

## 2023-11-01 ENCOUNTER — TELEPHONE (OUTPATIENT)
Dept: INTERNAL MEDICINE CLINIC | Facility: CLINIC | Age: 68
End: 2023-11-01

## 2023-11-01 NOTE — TELEPHONE ENCOUNTER
Incoming (mail or fax):  fax   Received from:  Eric Ramos  Documentation given to:  [facility] records request

## 2023-11-15 NOTE — TELEPHONE ENCOUNTER
Medical Record Request Received    Date Received in Office: 11/1/23    Records Request Received From: Dr Roselind Fabry    Date Sent to Scan Stat: 11/16/23    STAT Request?: no    Copy made for Provider & place in incoming folder: n/a    2018-present

## 2023-11-30 ENCOUNTER — PATIENT OUTREACH (OUTPATIENT)
Dept: CASE MANAGEMENT | Age: 68
End: 2023-11-30

## 2023-12-01 NOTE — PROGRESS NOTES
Called patient and left a message for patient to call back when they can. Reviewed patient chart. Left contact my contact number [phone]. Time Spent This Encounter Total: 3  min medical record review  Monthly Minute Total including today: 3 minutes    Goyo Gleason, [address] [place] [address] Management   [address] [place] [address]  Phone: [phone]. Time Jo 3rd Floor

## 2023-12-29 ENCOUNTER — PATIENT OUTREACH (OUTPATIENT)
Dept: CASE MANAGEMENT | Age: 68
End: 2023-12-29

## 2023-12-29 NOTE — PROGRESS NOTES
Called patient regarding ccm monthly and left a message for patient to call back when they can. Reviewed patient chart. Left contact my contact number [phone]. Time Spent This Encounter Total: 3  min medical record review  Monthly Minute Total including today: 3 minutes    Carl Crouch [address] Management  Phone: [phone]  [employer]

## 2024-01-31 ENCOUNTER — PATIENT OUTREACH (OUTPATIENT)
Dept: CASE MANAGEMENT | Age: 69
End: 2024-01-31

## 2024-01-31 NOTE — PROGRESS NOTES
Called patient regarding CCM monthly and left a message for patient to call back when they can. Reviewed patient chart. Left contact my contact number [phone].       Time Spent This Encounter Total: 3  min medical record review  Monthly Minute Total including today: 3 minutes    Rich Mclaughlin [facility]  Care Management  Phone: [phone]  [email or web address]

## 2024-02-22 ENCOUNTER — PATIENT OUTREACH (OUTPATIENT)
Dept: CASE MANAGEMENT | Age: 69
End: 2024-02-22

## 2024-02-22 NOTE — PROGRESS NOTES
Attempted to call pt for ccm monthly.  Escobar stated that patient moved to [place], [place] and they changed his provider.  Patient is no longer seeing Dr. Yuen.  Ruba from CCM program at this time.     Rich Mclaughlin, [facility]  Care Management  Phone: [phone]  [employer] [email or web address]

## 2025-02-16 NOTE — PLAN OF CARE
Problem: Impaired Activities of Daily Living  Goal: Achieve highest/safest level of independence in self care  Interventions:  - Assess ability and encourage patient to participate in ADLs to maximize function  - Promote sitting position while performing A moist

## (undated) DIAGNOSIS — E78.5 DYSLIPIDEMIA: ICD-10-CM

## (undated) DIAGNOSIS — M54.2 NECK PAIN: ICD-10-CM

## (undated) DIAGNOSIS — N28.9 KIDNEY FUNCTION ABNORMAL: Primary | ICD-10-CM

## (undated) DEVICE — REMOVER DURAPREP 3M

## (undated) DEVICE — PAIN TRAY: Brand: MEDLINE INDUSTRIES, INC.

## (undated) DEVICE — MARKER SKIN 2 TIP

## (undated) DEVICE — VALVE GUARDIAN 2 VSI HEMOSTASIS 8FR GW INS TOOL ROTOLOCK

## (undated) DEVICE — RF CANNULA, CVD: Brand: VENOM

## (undated) DEVICE — COVER PROBE FLX-FEEL 58X6IN OR 4 FLAG 2 SHT 24 PRINT STRL LF

## (undated) DEVICE — SKIN MARKER DUAL TIP WITH RULER CAP AND LABELS: Brand: DEVON

## (undated) DEVICE — BANDAID COVERLET 1X3

## (undated) DEVICE — GLOVE SURG SENSICARE SZ 7-1/2

## (undated) DEVICE — GLOVE SURG SENSICARE SZ 6-1/2

## (undated) DEVICE — GLOVE SURG SENSICARE SZ 7

## (undated) DEVICE — AVANOS* TUOHY EPIDURAL NEEDLE: Brand: AVANOS

## (undated) DEVICE — REMOVER PREP SOLUTION 4OZ

## (undated) DEVICE — GUIDEWIRE RUNTHROUGH 3CM 180CM .36MM VASC RADOPQ X FLPY STRL

## (undated) DEVICE — CATHETER BLN EMERGE MNRL WORKHORSE 3MM 12MM 144CM 2 LUM

## (undated) DEVICE — CATHETER US EE PLATINUM 3.3-2.9FR 20MM 150CM 24CM 20MHZ RX

## (undated) DEVICE — BNDG ADH W1INXL3IN NAT FAB N

## (undated) DEVICE — SHEET, T, LAPAROTOMY, STERILE: Brand: MEDLINE

## (undated) DEVICE — NEEDLE SPINAL 22X3-1/2 BLK

## (undated) DEVICE — ADAPTER TBG 2 MALE LL DEHP-FR STRL LF MEDEX 1IN DISP .1ML IV

## (undated) DEVICE — GUIDEWIRE 150CM 3MM RDS J CRV .035IN VASC PTFE FX CORE LF

## (undated) DEVICE — CATHETER BLN NC TREK 3.25MM 12MM RX RADOPQ SMTH RND TIP PTCA

## (undated) DEVICE — CAP,BOUFFANT,SPUNBOND,BLUE,24": Brand: MEDLINE INDUSTRIES, INC.

## (undated) DEVICE — Device

## (undated) DEVICE — STENT RONYX30022UX RESOLUTE ONYX 3.00X22
Type: IMPLANTABLE DEVICE | Site: RIGHT CORONARY ARTERY | Status: NON-FUNCTIONAL
Brand: RESOLUTE ONYX™

## (undated) DEVICE — SHEATH 6FR 10CM 2.5CM .035IN INTRO SNAP ON DIL LOCK KINK RST

## (undated) DEVICE — SYRINGE 10ML GRAD N-PYRG DEHP-FR PVC FREE STRL MED LF DISP

## (undated) DEVICE — GLOVE SURG 7.5 PROTEXIS LF CRM PF SMTH BEAD CUFF STRL

## (undated) DEVICE — KIT MICROINTRODUCER 4FR .018IN 40CM 7CM .018IN SFTP MNDRL

## (undated) DEVICE — SET XTN 3ML 20IN MED STD BORE 2 MALE LL PINCH CLAMP DEHP-FR

## (undated) DEVICE — CATHETER 6FR PGTL FL4 FR4 CRV 100110CM FULL LGTH WIRE BRAID

## (undated) DEVICE — CATHETER BLN MINI TREK SLIM SEAL 2MM 12MM RX ULTRA LOWPRFL

## (undated) DEVICE — CATHETER INTVN GUIDELINER V3 6FR 5.1FR .056IN 150CM 25CM

## (undated) DEVICE — CATHETER BLN NC EMERGE MNRL 3.5MM 12MM 143CM 2 LUM TPR TIP

## (undated) DEVICE — ELECTRODE DFBR UNV 15.2X10.8CM ADH PAD RDTRNS POUCH PADPRO

## (undated) NOTE — IP AVS SNAPSHOT
Patient Demographics     Address  [address] [place] [address] [address] [address] Phone  [phone] [facility])      Emergency Contact(s)     Name Relation Home Work Cleopatra Joseph7 [phone]    Armida Payne [phone] Commonly known as: Toprol XL  Next dose due: Tomorrow morning 8/24      Take 1 tablet (50 mg total) by mouth once daily. Quin Claude, MD         rOPINIRole HCl 0.5 MG Tabs  Commonly known as:  REQUIP  Next dose due:   Tonight at bedtime Resp  17 Filed at 08/23/2017 1300   Temp  97.8 °F (36.6 °C) Filed at 08/23/2017 1300   SpO2  98 % Filed at 08/23/2017 1300      Patient's Most Recent Weight    Flowsheet Row Most Recent Value   Patient Weight  95.3 kg (210 lb 1.6 oz)      Lab Results Last Diagnosis Date   • Arthritis    • BPH (benign prostatic hyperplasia) 9/19/2014   • CAD, multiple vessel 2005,7,12    nonocclusive   • Diverticulitis    • HTN (hypertension)    • IGT (impaired glucose tolerance) 6/9/2015   • AMALIA on CPAP 4/30/2015   • Other Pertinent positives and negatives noted in the HPI. Physical Exam:    BP (!) 192/118   Pulse 69   Temp 98.6 °F (37 °C) (Oral)   Resp 16   Ht 5' 6\" (1.676 m)   Wt 225 lb (102.1 kg)   SpO2 97%   BMI 36.32 kg/m²   General: No acute distress.  Alert and ben · DVT Prophylaxis: SCD  · CODE status: full  · Randolph: no    Plan of care discussed with patient and family. Scott Bañuelos DO  8/20/2017[NG.1]              Electronically signed by Charis Arenas DO on 8/21/2017  1:22 AM   Attribution Key    NG. 1 - [employer] storage unit the evening before. When he was walking, he was leaning towards the left side.   He thought it was heat related, did not pay attention to it, and decided to rest.  The next morning, he woke up with a tingling sensation in the left side and not Current Functional Status: Mod-Max A gait 50 ft      Past Medical History:  @Medical hx@  Past Medical History:   Diagnosis Date   • Arthritis    • BPH (benign prostatic hyperplasia) 9/19/2014   • CAD, multiple vessel 7108,4,89    nonocclusive   • Coronary magnesium hydroxide (MILK OF MAGNESIA) 400 MG/5ML suspension 30 mL 30 mL Oral Daily PRN   bisacodyl (DULCOLAX) rectal suppository 10 mg 10 mg Rectal Daily PRN   FLEET ENEMA (FLEET) 7-19 GM/118ML enema 133 mL 1 enema Rectal Once PRN   ondansetron HCl (ZOFRA Blood pressure 150/87, pulse 74, temperature 98 °F (36.7 °C), temperature source Oral, resp. rate 18, height 5' 6\" (1.676 m), weight 210 lb 1.6 oz (95.3 kg), SpO2 97 %.     Intake/Output Summary (Last 24 hours) at 08/22/17 1221  Last data filed at 08/22/17 ASSESSMENT:  Impaired mobility and self-care secondary to Acute Left In :  Joe Waters PTA (Physical Therapy Assistant)        PHYSICAL THERAPY TREATMENT NOTE - INPATIENT    Room Number: 6556/8575-T     Session: 2[AR.1]   Number of Visits to Meet Established Goals: 5    Presenting Problem: CVA[AR.2]    Problem Lis Dynamic Sitting: Fair           Static Standing: Poor +  Dynamic Standing: Poor +[AR.3]    ACTIVITY TOLERANCE  O2 Saturation: 98%  Room air    AM-PAC '6-Clicks' INPATIENT SHORT FORM - BASIC MOBILITY  How much difficulty does the patient currently have. Brennan Faust side stepping, tandem walking, braiding  Pt with multiple LOB   Upper Extremity  - open/close     Position Standing     Repetitions      Sets[AR.1]        Patient End of Session: Up in chair;Needs met;Call light within reach;RN aware of session/finding Goal #4     Goal #5     Goal #6     Goal Comments: Goals established on 8/21/2017 - all goals ongoing as of 8/23/2017[AR.1]      Attribution Connors    AR. 1 Cammy Ledezma, PTA on 8/23/2017 12:57 PM  AR. 2 - Jonathan Schultz, PTA on 8/23/2017  1:02 PM  AR. 3 Comment: right shoulder  1999: SHOULDER ARTHROSCOPY  No date: TOTAL KNEE REPLACEMENT[AR.3]    SUBJECTIVE[AR.1]  \"I am doing better! \"[AR.2]    Patient’s self-stated goal is[AR.1] - get back to work[AR.2]    OBJECTIVE[AR.1]  Precautions:  (ataxic to L) session, working towards increased functional mobility and independence. Pt performed supine<>sit transfer with c CGA assist, and VC's for BUE placement/support.  Pt performed sit<>stand to RW at [facility] assist.      Pt ambulated with  ft at [facility] with VC' re-educate;Strengthening;Stair training;Transfer training;Balance training  Rehab Potential : Good  Frequency (Obs): Daily[AR.3]    CURRENT GOALS[AR.1]     Goal #1 Patient is able to demonstrate supine - sit EOB @ level: modified independent   Goal #2 Annmarie cerebellar hemisphere and probably left lateral medulla.  Also noted to have R vertebral artery occlusion      Therapy significant co-morbidities:  BLE crush injury with multiple LLE surgeries and resultatn knee pain and decreased ROm, R shoulder history of \"left him\" and now lives with his son's mom who works    SUBJECTIVE  \"I want to be able to go back to [employer]"    Patient self-stated goal is get back to work    OBJECTIVE[JK.1]  Precautions:  (ataxic to L)  Fall Risk: High fall risk[JK.2]    WEIGHT BEARI AM-PAC '6-Clicks' INPATIENT SHORT FORM - BASIC MOBILITY  How much difficulty does the patient currently have. .. [JK.1]  -   Turning over in bed (including adjusting bedclothes, sheets and blankets)?: A Little   -   Sitting down on and standing up from a lv addressed;SCDs in place; Family present[JK.2]    ASSESSMENT     Patient is a[JK.3 65year old[JK.2] male admitted 8/20/2017 for CVA.    In this PT evaluation, the patient presents with the following impairments: decreased coordination, balance and report of Filed:  8/22/2017  2:39 PM Date of Service:  8/22/2017  2:32 PM Status:  Signed    :  Lino Salcedo OT (Occupational Therapist)       OCCUPATIONAL THERAPY TREATMENT NOTE - INPATIENT     Room Number: 6988/3654-M  Session: 1   Number of Visits t RLS (restless legs syndrome)    Acute CVA (cerebrovascular accident) ([facility] [address]    Occlusion of right vertebral artery    CKD (chronic kidney disease), stage III      Past Medical History  Past Medical History:   Diagnosis Date   • Arthritis    • BPH (benign CMS Modifier (G-Code): CK    FUNCTIONAL TRANSFER ASSESSMENT  Supine to Sit : Minimum assistance  Sit to Stand: Minimum assistance    Skilled Therapy Provided: Pt was calling for assistance, since he needed to use the bathroom.  Min A supine to sit, min A si simplification techniques;ADL training;Functional transfer training;UE strengthening/ROM; Patient/Family education;Patient/Family training;Equipment eval/education; Neuromuscluar reeducation; Fine motor coordination activities; Compensatory technique education Pt is[MS.3 65year old[MS. 2] male admitted on 8/20/2017 from home with c/o gait ataxia. Pt diagnosed with acute to subacute punctate infarct L cerebellum and probable L lateral medulla.      Therapy significant imaging (date, test, result):  8/20 Brain MR • AMALIA on CPAP 4/30/2015   • Other and unspecified hyperlipidemia    • RLS (restless legs syndrome) 12/3/2015       Past Surgical History  Past Surgical History:  No date: ANGIOGRAM  2009: BIOPSY OF PROSTATE,NEEDLE/PUNCH      Comment: benign  No date: JESSICA Spatial Orientation:   intact    SENSATION  Light touch:  intact    Communication: fluid speech      Behavioral/Emotional/Social: motivated    RANGE OF MOTION AND STRENGTH ASSESSMENT  Upper extremity ROM is within functional limits     Upper extremity stre of the RW to sit back down, noted ataxic gait, L side. PHQ 9 completed. Educated the patient about importance of using assistive device and fall precautions. Pt verbalized understanding. Educated the patient about acute rehab recommendation.   Pt replie SPEECH-LANGUAGE PATHOLOGIST    Filed:  8/22/2017  3:06 PM Date of Service:  8/22/2017  2:00 PM Status:  Signed    :  Sabrina Pitts, SLP (SPEECH-LANGUAGE PATHOLOGIST)       Attempted to see for therapy session however patient currently occupied

## (undated) NOTE — LETTER
19  RE: Pau Whiting    :     Dear Dr. Drew Johnson    Your patient is being scheduled for *2 pain management procedures at [facility].    Procedure:  Cervical Facet Injections, Left followed by Right  Date of Procedure: TBD, pending clearance  Physician: Dr. Rl Mahmood- Anesthesiologist     Your patient is currently taking  mg. Dr. Rl Mahmood usually recommends the medication be held for 7 days prior to each injection. Please verify patient is cleared to proceed with pain management procedures. Clearance Approved   ____________    Clearance Denied       ____________      Comments: ______________________________________________________    Signature: ________________________________  Date: _________________       If you have any questions please feel free to contact our office at [phone], option # 2. Please fax this clearance request to our office at fax # [phone] or send electronically.        Thank you,      [employer]

## (undated) NOTE — LETTER
10/06/22    RE: Daisy Nicholson    :     Dear Willistine Cowden    Your patient is being scheduled for a pain management procedure at [facility].    Procedure:  Lumbar Epidural Steroid Injection   Date of Procedure: TBD  Physician: - Anesthesiologist     Your patient is currently taking Aspirin 325mg.  usually recommends the medication be held for 7 days prior to injection. Please verify patient is cleared to proceed with pain management procedures. Clearance Approved   ____________    Clearance Denied       ____________      Comments: ______________________________________________________    Signature: ________________________________  Date: _________________       If you have any questions please feel free to contact our office at [phone], option # 2. Please fax this clearance request to our office at fax # [phone] or send electronically.        Thank you,      [employer]

## (undated) NOTE — MR AVS SNAPSHOT
After Visit Summary   3/5/2018    Laury Doshi    MRN: [id number]           Visit Information     Date & Time  3/5/2018 12:30 PM Provider  [facility]  Phone  [phone]      Allergies as of 3/5/2018  Anup will not need to use this code after you have completed the sign-up process. If you do not sign up before the expiration date, you must request a new code. [employer] Activation Code: [email or web address]  Expires: 5/4/2018  2:03 PM    4.  Enter your Zip Code and Da Available at primary care offices      [facility]     Treatment for mild  illness or injury that does  not require immediate attention.           Average cost  $70*       VIDEO VISITS

## (undated) NOTE — Clinical Note
Pt was contacted for TCM. S/w pt and other relatives per pt's request. Pt feels he is doing better since d/c. Medication list was reviewed. An appt was set for HFU on 12/7/20, cousin's spouse denied a sooner appt at this time.  Pt is staying with is cousin

## (undated) NOTE — LETTER
20      Alexa Maher   1955          Re: Alexa Maher  : 1955    To Whom It May Concern,  This patient is being followed by my office at [facility] for treatment of a neurologic condition.   There are

## (undated) NOTE — LETTER
[facility] [address], [address]  Consent for Procedure/Sedation    Date:     Time:       1. I authorize the performance upon [employer] the following:  Transesophageal Echocardiogram     2.  I authorize  (a ________________________________    ___________________    Witness: _________________________      Date: ___________________    Printed: 2018   11:01 AM  Patient Name: Cesar Prom        : 1955       Medical Record #: [id number]

## (undated) NOTE — LETTER
17          Khanh Ness  :  1955      To Whom It May Concern: The patient is being treated by me for stroke that occurred on 17.  Based on my assessment on 17, Khanh Ness may return to work with restrictions,

## (undated) NOTE — ED AVS SNAPSHOT
Alicja Mensah   MRN: [id number]    Department:  [facility] Emergency Department   Date of Visit:  1/2/2019           Disclosure     Insurance plans vary and the physician(s) referred by the ER may not be covered by your plan.  Please contac tell this physician (or your personal doctor if your instructions are to return to your personal doctor) about any new or lasting problems. The primary care or specialist physician will see patients referred from the [facility] Emergency Department.  Wing Spencer

## (undated) NOTE — LETTER
05/07/20        Alicja Mensah  [address] [facility]      Dear Delia Lugo,    [facility] records indicate that you have outstanding lab work and or testing that was ordered for you and has not yet been completed:        CMP

## (undated) NOTE — LETTER
11/02/17        Costa Jimenez  [address] [facility] [address] [facility] [address]    8/19/1955      Dear Juanita Castillo,    [facility] records indicate that you have outstanding lab work and or testing that was ordered for you and has not yet been completed:

## (undated) NOTE — IP AVS SNAPSHOT
[address]            (For Outpatient Use Only) Initial Admit Date: 8/20/2017   Inpt/Obs Admit Date: Inpt: 8/21/17 / Obs: N/A   Discharge Date:    Link Pattee:  [de-identified]   MRN: [de-identified]   CSN: [id number]        ENCOUNTER  Patient Subscriber Name:  Steven Simmons :    Subscriber ID:  Pt Rel to Subscriber:    Hospital Account Financial Class: [facility]    2017

## (undated) NOTE — Clinical Note
Just a couple of thoughts about this patient:  I believe he has really cognitively declined in the past year.  Maybe a neuropsych eval? Also needs cervical spine RFA and Dr Ana Laura Payne increased his ASA therapy in  the hospital last month for acute confusional

## (undated) NOTE — LETTER
2017    Carlos Castillo  : 1955    To Whom It May Concern: This letter has been written at the patient's request, to further clarify his work restrictions after a stroke on 17.   As identified in the previous letter, Mr. Jose Root

## (undated) NOTE — LETTER
07/02/19    Patient: Barbi Rick  Date of Birth:[birth year]  Member ID: [id number]  Case Reference Number: [id number]     To Whom It May Concern:    I am writing to provide additional information to support my claim of treatment of Pierre Riddle outlined above and to avoid any delay in his plan of care. Our office would like to request a reconsideration of the denial issued to cover these services.      Given the patient's history, condition, and data provided to you, it is my professional opinion

## (undated) NOTE — LETTER
Date: 10/3/2017    Patient Name: Neftali Narayanan          To Whom it may concern: This was seen at the [facility] for treatment of a medical condition.     This patient should be excused from attending work from 8/21/17 through 1

## (undated) NOTE — LETTER
10/11/22    RE: Desiree Skelton    :     Dear Horacio Babin    Your patient is being scheduled for a pain management procedure at [facility].    Procedure:  Lumbar Epidural Steroid Injection   Date of Procedure: Patient will be scheduled within 30 days of receiving medical clearance. Physician: Mana Salguero- Anesthesiologist     Your patient is currently taking Aspirin 325mg.  usually recommends the medication be held for 7 days prior to injection. Please verify patient is cleared to proceed with pain management procedures. Clearance Approved   ____________    Clearance Denied       ____________      Comments: ______________________________________________________    Signature: ________________________________  Date: _________________       If you have any questions please feel free to contact our office at [phone], option # 2. Please fax this clearance request to our office at fax # [phone] or send electronically.        Thank you,

## (undated) NOTE — ED AVS SNAPSHOT
Derekcarlitosgennaro Gunnar   MRN: [id number]    Department:  [facility] Emergency Department   Date of Visit:  1/25/2019           Disclosure     Insurance plans vary and the physician(s) referred by the ER may not be covered by your plan.  Please conta tell this physician (or your personal doctor if your instructions are to return to your personal doctor) about any new or lasting problems. The primary care or specialist physician will see patients referred from the [facility] Emergency Department.  Wayne Palomino

## (undated) NOTE — LETTER
11/02/20      Patient: Isabell Putnam  YOB: 1955  Member ID: [id number]      To Whom It May Concern:      I am writing to provide additional information to support my claim of treatment of Isabell Putnam with radiofrequency abla

## (undated) NOTE — LETTER
Date: 10/3/2017    Patient Name: Wallace Johnson          To Whom it may concern: This letter has been written at the patient's request. The above patient was seen at the [facility] for treatment of a medical condition.     This

## (undated) NOTE — LETTER
19    RE: Costa Jimenez    : [id number]    Dear Dr. Lauren Danielle    Your patient is being scheduled for a pain management procedure at [facility].    Procedure:  Cervical Facet Injections  Date of Procedure: TBD  Physician: Dr. Roberto Morgan-

## (undated) NOTE — MR AVS SNAPSHOT
[address] [address] [phone]               Thank you for choosing us for your health care visit with Erin Mcallister MD.  We are glad to serve you and happy to provid Take 1 tablet (20 mg total) by mouth daily.    Commonly known as:  PRAVACHOL           rOPINIRole HCl 0.5 MG Tabs   Take 1 in the morning and 2 at night   Commonly known as:  REQUIP           Sildenafil Citrate 100 MG Tabs   Take 1 tablet (100 mg total) by 2 ½ hours per week – spread out over time Use a angelita to keep you motivated   Don’t forget strength training with weights and resistance Set goals and track your progress   You don’t need to join a gym. Home exercises work great.  Put more priority on exe

## (undated) NOTE — LETTER
3/13/2018    Kendal Gonzales  : 1955     To Whom It May Concern:     This letter has been written at the patient's request, to further clarify his work restrictions after a stroke on 17.   As identified in the previous letter,  Aditya Sidhuy

## (undated) NOTE — LETTER
Understanding Sedation for Pain Procedures  Conscious sedation is a combination of medicines used to help you relax and to block pain during your procedure; you will probably stay awake and be relaxed.  You will receive the medication through an intravenous Printed: October 22, 2020     Medical Record #: [id number]                                    Page: 1 of  1

## (undated) NOTE — LETTER
20    RE: Flaquito Landaverde    : [id number]    **Patient is scheduled with Dr. Usha Sampson on 2020**    Dear Dr. Usha Sampson,    Your patient is being scheduled for a pain management procedure at [facility].    Procedure:  Radiofrequency Abla

## (undated) NOTE — LETTER
07/12/17        Margarita Law  [address] [place] [address]    8/19/1955     Dear Manas Arvada,    [facility] records indicate that you have outstanding lab work and or testing that was ordered for you and has not yet been completed:          L

## (undated) NOTE — LETTER
10/13/21        Margarita Real  [address] [facility]      Dear Manas Oliveira,    [facility] records indicate that you have outstanding lab work. To schedule an appointment please call Central Scheduling at [phone].  You ma

## (undated) NOTE — LETTER
19   RE: Joon Munoz    :     Dear Dr. Donna Acosta    Your patient is being scheduled for a pain management procedure at [facility].    Procedure:  Cervical Facet Injection  Date of Procedure: TBD pending medical clearance  Physi